# Patient Record
Sex: FEMALE | Race: WHITE | Employment: OTHER | URBAN - METROPOLITAN AREA
[De-identification: names, ages, dates, MRNs, and addresses within clinical notes are randomized per-mention and may not be internally consistent; named-entity substitution may affect disease eponyms.]

---

## 2017-01-19 RX ORDER — METOPROLOL SUCCINATE 50 MG/1
TABLET, EXTENDED RELEASE ORAL
Qty: 135 TABLET | Refills: 3 | Status: SHIPPED | OUTPATIENT
Start: 2017-01-19 | End: 2018-01-14 | Stop reason: SDUPTHER

## 2017-02-02 ENCOUNTER — OFFICE VISIT (OUTPATIENT)
Dept: FAMILY MEDICINE CLINIC | Age: 82
End: 2017-02-02

## 2017-02-02 VITALS
HEART RATE: 80 BPM | DIASTOLIC BLOOD PRESSURE: 56 MMHG | SYSTOLIC BLOOD PRESSURE: 112 MMHG | BODY MASS INDEX: 32.69 KG/M2 | RESPIRATION RATE: 16 BRPM | HEIGHT: 64 IN | WEIGHT: 191.5 LBS

## 2017-02-02 DIAGNOSIS — D50.8 OTHER IRON DEFICIENCY ANEMIA: ICD-10-CM

## 2017-02-02 DIAGNOSIS — I10 ESSENTIAL HYPERTENSION: ICD-10-CM

## 2017-02-02 DIAGNOSIS — E78.5 HYPERLIPIDEMIA, UNSPECIFIED HYPERLIPIDEMIA TYPE: ICD-10-CM

## 2017-02-02 DIAGNOSIS — M05.10 RHEUMATOID LUNG (HCC): ICD-10-CM

## 2017-02-02 DIAGNOSIS — J96.11 HYPOXEMIC RESPIRATORY FAILURE, CHRONIC (HCC): ICD-10-CM

## 2017-02-02 DIAGNOSIS — E11.69 TYPE 2 DIABETES MELLITUS WITH OTHER SPECIFIED COMPLICATION, WITHOUT LONG-TERM CURRENT USE OF INSULIN (HCC): Primary | ICD-10-CM

## 2017-02-02 DIAGNOSIS — M06.9 RHEUMATOID ARTHRITIS INVOLVING MULTIPLE SITES, UNSPECIFIED RHEUMATOID FACTOR PRESENCE: ICD-10-CM

## 2017-02-02 LAB
GLUCOSE BLD-MCNC: 116 MG/DL
HBA1C MFR BLD: 6.7 %

## 2017-02-02 PROCEDURE — 82962 GLUCOSE BLOOD TEST: CPT | Performed by: EMERGENCY MEDICINE

## 2017-02-02 PROCEDURE — 83036 HEMOGLOBIN GLYCOSYLATED A1C: CPT | Performed by: EMERGENCY MEDICINE

## 2017-02-02 PROCEDURE — 82270 OCCULT BLOOD FECES: CPT | Performed by: EMERGENCY MEDICINE

## 2017-02-02 PROCEDURE — 99214 OFFICE O/P EST MOD 30 MIN: CPT | Performed by: EMERGENCY MEDICINE

## 2017-02-02 RX ORDER — FERROUS SULFATE 325(65) MG
325 TABLET ORAL DAILY
Qty: 100 TABLET | Refills: 2 | Status: SHIPPED | OUTPATIENT
Start: 2017-02-02 | End: 2019-07-09 | Stop reason: CLARIF

## 2017-02-02 ASSESSMENT — ENCOUNTER SYMPTOMS
COUGH: 0
TROUBLE SWALLOWING: 0
BACK PAIN: 0
NAUSEA: 0
DIARRHEA: 0
CONSTIPATION: 0
VOMITING: 0
SORE THROAT: 0
ABDOMINAL PAIN: 0
VISUAL CHANGE: 0
SHORTNESS OF BREATH: 1
RHINORRHEA: 0
SINUS PRESSURE: 0
VOICE CHANGE: 0
CHEST TIGHTNESS: 0
WHEEZING: 0

## 2017-02-09 ENCOUNTER — TELEPHONE (OUTPATIENT)
Dept: FAMILY MEDICINE CLINIC | Age: 82
End: 2017-02-09

## 2017-02-09 DIAGNOSIS — R19.5 POSITIVE OCCULT STOOL BLOOD TEST: Primary | ICD-10-CM

## 2017-02-09 LAB
HEMOCCULT STL QL: NEGATIVE
HEMOCCULT STL QL: NEGATIVE
HEMOCCULT STL QL: POSITIVE

## 2017-02-15 ENCOUNTER — TELEPHONE (OUTPATIENT)
Dept: FAMILY MEDICINE CLINIC | Age: 82
End: 2017-02-15

## 2017-03-23 ENCOUNTER — OFFICE VISIT (OUTPATIENT)
Dept: FAMILY MEDICINE CLINIC | Age: 82
End: 2017-03-23

## 2017-03-23 VITALS
RESPIRATION RATE: 16 BRPM | HEIGHT: 63 IN | SYSTOLIC BLOOD PRESSURE: 104 MMHG | DIASTOLIC BLOOD PRESSURE: 50 MMHG | WEIGHT: 185.25 LBS | BODY MASS INDEX: 32.82 KG/M2 | HEART RATE: 88 BPM

## 2017-03-23 DIAGNOSIS — R04.0 RIGHT-SIDED EPISTAXIS: Primary | ICD-10-CM

## 2017-03-23 DIAGNOSIS — I10 ESSENTIAL HYPERTENSION: ICD-10-CM

## 2017-03-23 DIAGNOSIS — D50.9 IRON DEFICIENCY ANEMIA, UNSPECIFIED IRON DEFICIENCY ANEMIA TYPE: ICD-10-CM

## 2017-03-23 DIAGNOSIS — M05.10 RHEUMATOID LUNG (HCC): ICD-10-CM

## 2017-03-23 DIAGNOSIS — E78.5 HYPERLIPIDEMIA, UNSPECIFIED HYPERLIPIDEMIA TYPE: ICD-10-CM

## 2017-03-23 DIAGNOSIS — J84.10 LUNG FIBROSIS (HCC): ICD-10-CM

## 2017-03-23 LAB — HGB, POC: 10

## 2017-03-23 PROCEDURE — 99214 OFFICE O/P EST MOD 30 MIN: CPT | Performed by: EMERGENCY MEDICINE

## 2017-03-23 PROCEDURE — 85018 HEMOGLOBIN: CPT | Performed by: EMERGENCY MEDICINE

## 2017-03-23 RX ORDER — DEXTROSE (GLUCOSE), LEVULOSE (FRUCTOSE), PHOSPHORIC ACID 1.87; 1.87; 21.5 G/5ML; G/5ML; MG/5ML
SOLUTION ORAL
Refills: 0 | COMMUNITY
Start: 2017-03-02 | End: 2018-08-23 | Stop reason: ALTCHOICE

## 2017-03-23 RX ORDER — ERYTHROMYCIN 500 MG/1
TABLET, COATED ORAL
Refills: 0 | COMMUNITY
Start: 2017-03-16 | End: 2018-05-18 | Stop reason: ALTCHOICE

## 2017-03-23 ASSESSMENT — ENCOUNTER SYMPTOMS
WHEEZING: 0
VOMITING: 0
SORE THROAT: 0
RHINORRHEA: 0
CHEST TIGHTNESS: 0
DIARRHEA: 0
BACK PAIN: 0
NAUSEA: 0
SINUS PRESSURE: 0
CONSTIPATION: 0
TROUBLE SWALLOWING: 0
VOICE CHANGE: 0
SHORTNESS OF BREATH: 0
ABDOMINAL PAIN: 0
COUGH: 0

## 2017-05-02 ENCOUNTER — OFFICE VISIT (OUTPATIENT)
Dept: FAMILY MEDICINE CLINIC | Age: 82
End: 2017-05-02

## 2017-05-02 VITALS
RESPIRATION RATE: 16 BRPM | WEIGHT: 184 LBS | BODY MASS INDEX: 32.6 KG/M2 | HEIGHT: 63 IN | HEART RATE: 88 BPM | DIASTOLIC BLOOD PRESSURE: 70 MMHG | SYSTOLIC BLOOD PRESSURE: 120 MMHG

## 2017-05-02 DIAGNOSIS — I10 ESSENTIAL HYPERTENSION: ICD-10-CM

## 2017-05-02 DIAGNOSIS — M06.9 RHEUMATOID ARTHRITIS INVOLVING MULTIPLE SITES, UNSPECIFIED RHEUMATOID FACTOR PRESENCE: ICD-10-CM

## 2017-05-02 DIAGNOSIS — E78.5 HYPERLIPIDEMIA, UNSPECIFIED HYPERLIPIDEMIA TYPE: ICD-10-CM

## 2017-05-02 DIAGNOSIS — J84.10 LUNG FIBROSIS (HCC): ICD-10-CM

## 2017-05-02 DIAGNOSIS — E11.69 TYPE 2 DIABETES MELLITUS WITH OTHER SPECIFIED COMPLICATION, WITHOUT LONG-TERM CURRENT USE OF INSULIN (HCC): Primary | ICD-10-CM

## 2017-05-02 DIAGNOSIS — J96.11 HYPOXEMIC RESPIRATORY FAILURE, CHRONIC (HCC): ICD-10-CM

## 2017-05-02 LAB
GLUCOSE BLD-MCNC: 115 MG/DL
HBA1C MFR BLD: 6.3 %

## 2017-05-02 PROCEDURE — 99213 OFFICE O/P EST LOW 20 MIN: CPT | Performed by: EMERGENCY MEDICINE

## 2017-05-02 PROCEDURE — 82962 GLUCOSE BLOOD TEST: CPT | Performed by: EMERGENCY MEDICINE

## 2017-05-02 PROCEDURE — 83036 HEMOGLOBIN GLYCOSYLATED A1C: CPT | Performed by: EMERGENCY MEDICINE

## 2017-05-02 ASSESSMENT — ENCOUNTER SYMPTOMS
WHEEZING: 0
TROUBLE SWALLOWING: 0
COUGH: 0
VISUAL CHANGE: 0
VOICE CHANGE: 0
ABDOMINAL PAIN: 0
VOMITING: 0
BACK PAIN: 0
DIARRHEA: 0
SORE THROAT: 0
SINUS PRESSURE: 0
RHINORRHEA: 0
NAUSEA: 0
SHORTNESS OF BREATH: 1
CONSTIPATION: 0
CHEST TIGHTNESS: 0

## 2017-05-02 ASSESSMENT — PATIENT HEALTH QUESTIONNAIRE - PHQ9
SUM OF ALL RESPONSES TO PHQ QUESTIONS 1-9: 0
1. LITTLE INTEREST OR PLEASURE IN DOING THINGS: 0
SUM OF ALL RESPONSES TO PHQ9 QUESTIONS 1 & 2: 0
2. FEELING DOWN, DEPRESSED OR HOPELESS: 0

## 2017-06-07 RX ORDER — LOSARTAN POTASSIUM AND HYDROCHLOROTHIAZIDE 12.5; 5 MG/1; MG/1
TABLET ORAL
Qty: 90 TABLET | Refills: 1 | Status: SHIPPED | OUTPATIENT
Start: 2017-06-07 | End: 2018-03-20 | Stop reason: SDUPTHER

## 2017-06-26 RX ORDER — PANTOPRAZOLE SODIUM 40 MG/1
TABLET, DELAYED RELEASE ORAL
Qty: 90 TABLET | Refills: 0 | Status: SHIPPED | OUTPATIENT
Start: 2017-06-26 | End: 2017-09-25 | Stop reason: SDUPTHER

## 2017-07-06 ENCOUNTER — OFFICE VISIT (OUTPATIENT)
Dept: PULMONOLOGY | Age: 82
End: 2017-07-06

## 2017-07-06 VITALS
OXYGEN SATURATION: 95 % | DIASTOLIC BLOOD PRESSURE: 62 MMHG | HEIGHT: 63 IN | SYSTOLIC BLOOD PRESSURE: 114 MMHG | TEMPERATURE: 98.3 F | BODY MASS INDEX: 32.64 KG/M2 | WEIGHT: 184.2 LBS | HEART RATE: 90 BPM

## 2017-07-06 DIAGNOSIS — J96.11 HYPOXEMIC RESPIRATORY FAILURE, CHRONIC (HCC): Primary | ICD-10-CM

## 2017-07-06 DIAGNOSIS — M05.10 RHEUMATOID LUNG (HCC): ICD-10-CM

## 2017-07-06 PROCEDURE — 99213 OFFICE O/P EST LOW 20 MIN: CPT | Performed by: INTERNAL MEDICINE

## 2017-07-06 RX ORDER — IPRATROPIUM BROMIDE AND ALBUTEROL SULFATE 2.5; .5 MG/3ML; MG/3ML
1 SOLUTION RESPIRATORY (INHALATION) EVERY 4 HOURS
Qty: 360 ML | Refills: 11 | Status: SHIPPED | OUTPATIENT
Start: 2017-07-06 | End: 2018-07-12 | Stop reason: SDUPTHER

## 2017-08-17 ENCOUNTER — OFFICE VISIT (OUTPATIENT)
Dept: FAMILY MEDICINE CLINIC | Age: 82
End: 2017-08-17

## 2017-08-17 VITALS
HEIGHT: 63 IN | WEIGHT: 181.38 LBS | SYSTOLIC BLOOD PRESSURE: 116 MMHG | DIASTOLIC BLOOD PRESSURE: 62 MMHG | RESPIRATION RATE: 16 BRPM | BODY MASS INDEX: 32.14 KG/M2 | HEART RATE: 84 BPM

## 2017-08-17 DIAGNOSIS — E78.5 HYPERLIPIDEMIA, UNSPECIFIED HYPERLIPIDEMIA TYPE: ICD-10-CM

## 2017-08-17 DIAGNOSIS — J96.10 CHRONIC RESPIRATORY FAILURE, UNSPECIFIED WHETHER WITH HYPOXIA OR HYPERCAPNIA (HCC): ICD-10-CM

## 2017-08-17 DIAGNOSIS — I10 ESSENTIAL HYPERTENSION: ICD-10-CM

## 2017-08-17 DIAGNOSIS — E11.69 TYPE 2 DIABETES MELLITUS WITH OTHER SPECIFIED COMPLICATION, UNSPECIFIED LONG TERM INSULIN USE STATUS: ICD-10-CM

## 2017-08-17 DIAGNOSIS — J84.10 LUNG FIBROSIS (HCC): ICD-10-CM

## 2017-08-17 DIAGNOSIS — M05.711 RHEUMATOID ARTHRITIS INVOLVING BOTH SHOULDERS WITH POSITIVE RHEUMATOID FACTOR (HCC): Primary | ICD-10-CM

## 2017-08-17 DIAGNOSIS — M05.712 RHEUMATOID ARTHRITIS INVOLVING BOTH SHOULDERS WITH POSITIVE RHEUMATOID FACTOR (HCC): Primary | ICD-10-CM

## 2017-08-17 LAB
GLUCOSE BLD-MCNC: 100 MG/DL
HBA1C MFR BLD: 6.8 %

## 2017-08-17 PROCEDURE — 83036 HEMOGLOBIN GLYCOSYLATED A1C: CPT | Performed by: EMERGENCY MEDICINE

## 2017-08-17 PROCEDURE — 99214 OFFICE O/P EST MOD 30 MIN: CPT | Performed by: EMERGENCY MEDICINE

## 2017-08-17 PROCEDURE — 82962 GLUCOSE BLOOD TEST: CPT | Performed by: EMERGENCY MEDICINE

## 2017-08-17 RX ORDER — TRAMADOL HYDROCHLORIDE 100 MG/1
100 TABLET, EXTENDED RELEASE ORAL EVERY MORNING
Qty: 30 TABLET | Refills: 2 | Status: SHIPPED | OUTPATIENT
Start: 2017-08-17 | End: 2017-11-07 | Stop reason: SDUPTHER

## 2017-08-17 ASSESSMENT — ENCOUNTER SYMPTOMS
NAUSEA: 0
SORE THROAT: 0
SHORTNESS OF BREATH: 1
RHINORRHEA: 0
CONSTIPATION: 0
VOICE CHANGE: 0
COUGH: 0
BACK PAIN: 0
DIARRHEA: 0
SINUS PRESSURE: 0
VOMITING: 0
CHEST TIGHTNESS: 0
TROUBLE SWALLOWING: 0
VISUAL CHANGE: 0
ABDOMINAL PAIN: 0
WHEEZING: 0

## 2017-08-22 ENCOUNTER — TELEPHONE (OUTPATIENT)
Dept: FAMILY MEDICINE CLINIC | Age: 82
End: 2017-08-22

## 2017-09-25 RX ORDER — PANTOPRAZOLE SODIUM 40 MG/1
TABLET, DELAYED RELEASE ORAL
Qty: 90 TABLET | Refills: 1 | Status: SHIPPED | OUTPATIENT
Start: 2017-09-25 | End: 2018-03-24 | Stop reason: SDUPTHER

## 2017-11-05 LAB
ALBUMIN SERPL-MCNC: 3.8 G/DL (ref 3.2–5.3)
ALK PHOSPHATASE: 81 IU/L (ref 35–121)
ALT SERPL-CCNC: 9 IU/L (ref 5–59)
ANION GAP SERPL CALCULATED.3IONS-SCNC: 13 MMOL/L
AST SERPL-CCNC: 16 IU/L (ref 10–42)
BILIRUB SERPL-MCNC: 0.5 MG/DL (ref 0.2–1.3)
BUN BLDV-MCNC: 17 MG/DL (ref 9–24)
CALCIUM SERPL-MCNC: 9.8 MG/DL (ref 8.7–10.8)
CHLORIDE BLD-SCNC: 98 MMOL/L (ref 95–111)
CHOLESTEROL/HDL RATIO: 2.3
CHOLESTEROL: 153 MG/DL
CO2: 32 MMOL/L (ref 21–32)
CREAT SERPL-MCNC: 0.9 MG/DL (ref 0.5–1.3)
EGFR AFRICAN AMERICAN: 73
EGFR IF NONAFRICAN AMERICAN: 60
GLUCOSE: 143 MG/DL (ref 70–100)
HDLC SERPL-MCNC: 67 MG/DL (ref 40–60)
LDL CHOLESTEROL CALCULATED: 61 MG/DL
LDL/HDL RATIO: 0.9
MAGNESIUM: 2.1 MG/DL (ref 1.7–2.4)
POTASSIUM SERPL-SCNC: 4.2 MMOL/L (ref 3.5–5.4)
SODIUM BLD-SCNC: 139 MMOL/L (ref 134–147)
TOTAL PROTEIN: 7.1 G/DL (ref 5.8–8)
TRIGL SERPL-MCNC: 124 MG/DL
VLDLC SERPL CALC-MCNC: 25 MG/DL

## 2017-11-06 ENCOUNTER — OFFICE VISIT (OUTPATIENT)
Dept: CARDIOLOGY CLINIC | Age: 82
End: 2017-11-06
Payer: MEDICARE

## 2017-11-06 VITALS
WEIGHT: 174 LBS | DIASTOLIC BLOOD PRESSURE: 66 MMHG | HEART RATE: 91 BPM | HEIGHT: 62 IN | SYSTOLIC BLOOD PRESSURE: 114 MMHG | BODY MASS INDEX: 32.02 KG/M2

## 2017-11-06 DIAGNOSIS — I25.10 CORONARY ARTERY DISEASE INVOLVING NATIVE CORONARY ARTERY OF NATIVE HEART WITHOUT ANGINA PECTORIS: Primary | ICD-10-CM

## 2017-11-06 DIAGNOSIS — E78.01 FAMILIAL HYPERCHOLESTEROLEMIA: ICD-10-CM

## 2017-11-06 DIAGNOSIS — I10 ESSENTIAL HYPERTENSION: ICD-10-CM

## 2017-11-06 PROCEDURE — 99213 OFFICE O/P EST LOW 20 MIN: CPT | Performed by: NUCLEAR MEDICINE

## 2017-11-06 PROCEDURE — 93000 ELECTROCARDIOGRAM COMPLETE: CPT | Performed by: NUCLEAR MEDICINE

## 2017-11-06 NOTE — PROGRESS NOTES
Pt here for 1 year fu     Denies chest pain, dizziness   Pt is on O2 24/7
Cholesterol Mother     Stroke Mother     Diabetes Mother     Heart Disease Father     High Blood Pressure Father     Pacemaker Father      Social History   Substance Use Topics    Smoking status: Never Smoker    Smokeless tobacco: Never Used    Alcohol use 0.0 oz/week      Comment: RARE      Current Outpatient Prescriptions   Medication Sig Dispense Refill    pantoprazole (PROTONIX) 40 MG tablet TAKE 1 TABLET DAILY 90 tablet 1    traMADol (ULTRAM ER) 100 MG extended release tablet Take 1 tablet by mouth every morning 30 tablet 2    ipratropium-albuterol (DUONEB) 0.5-2.5 (3) MG/3ML SOLN nebulizer solution Inhale 3 mLs into the lungs every 4 hours 360 mL 11    losartan-hydrochlorothiazide (HYZAAR) 50-12.5 MG per tablet TAKE 1 TABLET DAILY 90 tablet 1    erythromycin base (E-MYCIN) 500 MG tablet take 1 tablet by mouth 1 HOUR PRIOR TO DENTAL APPOINTMENT  0    RA 8 HOUR PAIN RELIEF 650 MG extended release tablet   0    Probiotic Product (ALIGN PO) Take by mouth daily      ferrous sulfate (LEONORA-IRENE) 325 (65 FE) MG tablet Take 1 tablet by mouth daily 100 tablet 2    metoprolol succinate (TOPROL XL) 50 MG extended release tablet TAKE ONE AND ONE-HALF TABLETS DAILY 135 tablet 3    atorvastatin (LIPITOR) 40 MG tablet TAKE 1 TABLET EVERY EVENING 90 tablet 3    Omega-3 Fatty Acids (FISH OIL PO) Take by mouth      desoximetasone (TOPICORT) 0.25 % cream Apply topically 2 times daily   0    OXYGEN 2 lpm at rest and to sleep and 4 lpm to ambulate (Patient taking differently: 3 lpm at rest and to sleep and 4 lpm to ambulate) 1 Device 6    Methotrexate Sodium, PF, (RHEUMATREX) 1 GM/40ML SOLN chemo injection Inject 12.5 mg into the muscle once Every other week      therapeutic multivitamin-minerals (THERAGRAN-M) tablet Take 1 tablet by mouth daily.  Calcium Citrate-Vitamin D 200-200 MG-UNIT TABS Take 1 tablet by mouth 2 times daily       Coenzyme Q10 (COQ10) 100 MG CAPS Take 100 mg by mouth daily.

## 2017-11-07 ENCOUNTER — OFFICE VISIT (OUTPATIENT)
Dept: FAMILY MEDICINE CLINIC | Age: 82
End: 2017-11-07

## 2017-11-07 VITALS
WEIGHT: 176 LBS | HEART RATE: 80 BPM | DIASTOLIC BLOOD PRESSURE: 64 MMHG | RESPIRATION RATE: 14 BRPM | SYSTOLIC BLOOD PRESSURE: 126 MMHG | BODY MASS INDEX: 32.39 KG/M2 | HEIGHT: 62 IN

## 2017-11-07 DIAGNOSIS — M05.10 RHEUMATOID LUNG DISEASE WITH RHEUMATOID ARTHRITIS (HCC): ICD-10-CM

## 2017-11-07 DIAGNOSIS — I10 ESSENTIAL HYPERTENSION: ICD-10-CM

## 2017-11-07 DIAGNOSIS — I25.10 CORONARY ARTERY DISEASE INVOLVING NATIVE CORONARY ARTERY OF NATIVE HEART WITHOUT ANGINA PECTORIS: ICD-10-CM

## 2017-11-07 DIAGNOSIS — K21.9 GASTROESOPHAGEAL REFLUX DISEASE, ESOPHAGITIS PRESENCE NOT SPECIFIED: ICD-10-CM

## 2017-11-07 DIAGNOSIS — M06.9 RHEUMATOID ARTHRITIS INVOLVING MULTIPLE SITES, UNSPECIFIED RHEUMATOID FACTOR PRESENCE: Primary | ICD-10-CM

## 2017-11-07 DIAGNOSIS — E78.5 HYPERLIPIDEMIA, UNSPECIFIED HYPERLIPIDEMIA TYPE: ICD-10-CM

## 2017-11-07 DIAGNOSIS — G89.4 CHRONIC PAIN SYNDROME: ICD-10-CM

## 2017-11-07 PROCEDURE — 99213 OFFICE O/P EST LOW 20 MIN: CPT | Performed by: EMERGENCY MEDICINE

## 2017-11-07 RX ORDER — TRAMADOL HYDROCHLORIDE 100 MG/1
100 TABLET, EXTENDED RELEASE ORAL EVERY MORNING
Qty: 30 TABLET | Refills: 2 | Status: SHIPPED | OUTPATIENT
Start: 2017-11-07 | End: 2018-03-08 | Stop reason: SDUPTHER

## 2017-11-07 ASSESSMENT — ENCOUNTER SYMPTOMS
VOICE CHANGE: 0
ABDOMINAL PAIN: 0
SORE THROAT: 0
BACK PAIN: 0
DIARRHEA: 0
SHORTNESS OF BREATH: 1
RHINORRHEA: 0
SINUS PRESSURE: 0
NAUSEA: 0
COUGH: 0
WHEEZING: 0
CHEST TIGHTNESS: 0
TROUBLE SWALLOWING: 0
VISUAL CHANGE: 0
VOMITING: 0
CONSTIPATION: 0

## 2017-11-07 ASSESSMENT — PATIENT HEALTH QUESTIONNAIRE - PHQ9
2. FEELING DOWN, DEPRESSED OR HOPELESS: 0
SUM OF ALL RESPONSES TO PHQ QUESTIONS 1-9: 0
1. LITTLE INTEREST OR PLEASURE IN DOING THINGS: 0
SUM OF ALL RESPONSES TO PHQ9 QUESTIONS 1 & 2: 0

## 2017-11-07 NOTE — PROGRESS NOTES
Visit Date: 11/7/2017    Phil Elliott is a 80 y.o. female who presents today for:  Chief Complaint   Patient presents with    Hypertension    Diabetes         HPI:     Ultram ER is helping her pain and tolerating it well    Selam [place] yesterday and she is doing well      Diabetes   She presents for her follow-up diabetic visit. She has type 2 diabetes mellitus. Her disease course has been stable. Pertinent negatives for hypoglycemia include no dizziness, headaches, nervousness/anxiousness or speech difficulty. Pertinent negatives for diabetes include no chest pain, no fatigue, no foot paresthesias, no visual change and no weakness. Symptoms are stable. An ACE inhibitor/angiotensin II receptor blocker is being taken. Hypertension   Associated symptoms include shortness of breath. Pertinent negatives include no chest pain, headaches, neck pain or palpitations. Hyperlipidemia   Associated symptoms include shortness of breath. Pertinent negatives include no chest pain or myalgias. Coronary Artery Disease   Symptoms include shortness of breath. Pertinent negatives include no chest pain, chest tightness, dizziness, leg swelling or palpitations. Risk factors include hyperlipidemia. Current Medications:  Current Outpatient Prescriptions   Medication Sig Dispense Refill    traMADol (ULTRAM ER) 100 MG extended release tablet Take 1 tablet by mouth every morning .  30 tablet 2    pantoprazole (PROTONIX) 40 MG tablet TAKE 1 TABLET DAILY 90 tablet 1    ipratropium-albuterol (DUONEB) 0.5-2.5 (3) MG/3ML SOLN nebulizer solution Inhale 3 mLs into the lungs every 4 hours 360 mL 11    losartan-hydrochlorothiazide (HYZAAR) 50-12.5 MG per tablet TAKE 1 TABLET DAILY 90 tablet 1    erythromycin base (E-MYCIN) 500 MG tablet take 1 tablet by mouth 1 HOUR PRIOR TO DENTAL APPOINTMENT  0    RA 8 HOUR PAIN RELIEF 650 MG extended release tablet   0    Probiotic Product (ALIGN PO) Take by mouth daily      ferrous sulfate (LEONORA-IRENE) 325 (65 FE) MG tablet Take 1 tablet by mouth daily 100 tablet 2    metoprolol succinate (TOPROL XL) 50 MG extended release tablet TAKE ONE AND ONE-HALF TABLETS DAILY 135 tablet 3    atorvastatin (LIPITOR) 40 MG tablet TAKE 1 TABLET EVERY EVENING 90 tablet 3    Omega-3 Fatty Acids (FISH OIL PO) Take by mouth      desoximetasone (TOPICORT) 0.25 % cream Apply topically 2 times daily   0    OXYGEN 2 lpm at rest and to sleep and 4 lpm to ambulate (Patient taking differently: 3 lpm at rest and to sleep and 4 lpm to ambulate) 1 Device 6    Methotrexate Sodium, PF, (RHEUMATREX) 1 GM/40ML SOLN chemo injection Inject 12.5 mg into the muscle once Every other week      therapeutic multivitamin-minerals (THERAGRAN-M) tablet Take 1 tablet by mouth daily.  Calcium Citrate-Vitamin D 200-200 MG-UNIT TABS Take 1 tablet by mouth 2 times daily       Coenzyme Q10 (COQ10) 100 MG CAPS Take 100 mg by mouth daily.  aspirin 81 MG EC tablet Take 81 mg by mouth daily.  folic acid (FOLVITE) 1 MG tablet Take 1 mg by mouth daily. No current facility-administered medications for this visit. Subjective:      Review of Systems   Constitutional: Negative for appetite change, chills, diaphoresis, fatigue and fever. HENT: Negative for congestion, ear pain, postnasal drip, rhinorrhea, sinus pressure, sneezing, sore throat, trouble swallowing and voice change. Respiratory: Positive for shortness of breath. Negative for cough, chest tightness and wheezing. Cardiovascular: Negative for chest pain, palpitations and leg swelling. Gastrointestinal: Negative for abdominal pain, constipation, diarrhea, nausea and vomiting. Musculoskeletal: Negative for arthralgias, back pain, joint swelling, myalgias, neck pain and neck stiffness. Neurological: Negative for dizziness, syncope, speech difficulty, weakness, light-headedness, numbness and headaches.    Psychiatric/Behavioral: The patient is not nervous/anxious. Objective:     /64 (Site: Right Arm, Position: Sitting, Cuff Size: Medium Adult)   Pulse 80   Resp 14   Ht 5' 2\" (1.575 m)   Wt 176 lb (79.8 kg)   Breastfeeding? No   BMI 32.19 kg/m²   BP Readings from Last 3 Encounters:   11/07/17 126/64   11/06/17 114/66   08/17/17 116/62     Wt Readings from Last 3 Encounters:   11/07/17 176 lb (79.8 kg)   11/06/17 174 lb (78.9 kg)   08/17/17 181 lb 6 oz (82.3 kg)       Physical Exam   Constitutional: She is oriented to person, place, and time. She appears well-developed and well-nourished. She is cooperative. HENT:   Head: Normocephalic and atraumatic. Right Ear: External ear normal.   Left Ear: External ear normal.   Nose: Nose normal.   Mouth/Throat: Oropharynx is clear and moist.   Eyes: Conjunctivae and EOM are normal. Pupils are equal, round, and reactive to light. No scleral icterus. Neck: Normal range of motion. Neck supple. No JVD present. No thyromegaly present. Cardiovascular: Normal rate, regular rhythm and intact distal pulses. Exam reveals no friction rub. No murmur heard. Pulmonary/Chest: Effort normal. She has decreased breath sounds. She has no wheezes. She has rales. She exhibits no tenderness. Abdominal: Soft. Bowel sounds are normal. She exhibits no mass. There is no tenderness. Musculoskeletal: She exhibits no edema. Lymphadenopathy:     She has no cervical adenopathy. Neurological: She is alert and oriented to person, place, and time. Skin: No rash noted. Vitals reviewed. Assessment:         1. Rheumatoid arthritis involving multiple sites, unspecified rheumatoid factor presence (HCC)  traMADol (ULTRAM ER) 100 MG extended release tablet   2. Chronic pain syndrome  traMADol (ULTRAM ER) 100 MG extended release tablet   3. Essential hypertension     4. Coronary artery disease involving native coronary artery of native heart without angina pectoris     5.  Hyperlipidemia, unspecified hyperlipidemia type     6. 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 Dispense:  30 tablet     Refill:  2       Orders Placed:  No orders of the defined types were placed in this encounter. Lab Results   Component Value Date    LABA1C 6.8 08/17/2017    LABA1C 6.3 05/02/2017    LABA1C 6.7 02/02/2017     Lab Results   Component Value Date    LDLCALC 61 11/04/2017    CREATININE 0.9 11/04/2017       Results of Laboratory tests taken 11/4/17  were reviewed with the patient. Results were w/in  acceptable range    Continue to monitor blood sugars 1 times a day. Return in about 3 months (around 2/7/2018) for HTN DM. Discussed use, benefit, and side effects of prescribed medications. All patient questions answered. Pt voiced understanding. Instructed to continue current medications, diet and exercise. Patient agreed with treatment plan.

## 2017-11-13 RX ORDER — ATORVASTATIN CALCIUM 40 MG/1
TABLET, FILM COATED ORAL
Qty: 90 TABLET | Refills: 3 | Status: SHIPPED | OUTPATIENT
Start: 2017-11-13 | End: 2018-11-08 | Stop reason: SDUPTHER

## 2018-01-15 RX ORDER — METOPROLOL SUCCINATE 50 MG/1
TABLET, EXTENDED RELEASE ORAL
Qty: 135 TABLET | Refills: 3 | Status: SHIPPED | OUTPATIENT
Start: 2018-01-15 | End: 2019-01-13 | Stop reason: SDUPTHER

## 2018-02-13 ENCOUNTER — OFFICE VISIT (OUTPATIENT)
Dept: FAMILY MEDICINE CLINIC | Age: 83
End: 2018-02-13

## 2018-02-13 VITALS
HEIGHT: 62 IN | RESPIRATION RATE: 16 BRPM | WEIGHT: 172 LBS | HEART RATE: 64 BPM | SYSTOLIC BLOOD PRESSURE: 108 MMHG | DIASTOLIC BLOOD PRESSURE: 60 MMHG | BODY MASS INDEX: 31.65 KG/M2

## 2018-02-13 DIAGNOSIS — J96.11 HYPOXEMIC RESPIRATORY FAILURE, CHRONIC (HCC): ICD-10-CM

## 2018-02-13 DIAGNOSIS — E11.69 TYPE 2 DIABETES MELLITUS WITH OTHER SPECIFIED COMPLICATION, UNSPECIFIED LONG TERM INSULIN USE STATUS: Primary | ICD-10-CM

## 2018-02-13 DIAGNOSIS — M05.10 RHEUMATOID LUNG DISEASE WITH RHEUMATOID ARTHRITIS (HCC): ICD-10-CM

## 2018-02-13 DIAGNOSIS — E78.5 HYPERLIPIDEMIA, UNSPECIFIED HYPERLIPIDEMIA TYPE: ICD-10-CM

## 2018-02-13 DIAGNOSIS — D84.9 IMMUNOCOMPROMISED (HCC): ICD-10-CM

## 2018-02-13 DIAGNOSIS — I10 ESSENTIAL HYPERTENSION: ICD-10-CM

## 2018-02-13 DIAGNOSIS — M05.79 RHEUMATOID ARTHRITIS INVOLVING MULTIPLE SITES WITH POSITIVE RHEUMATOID FACTOR (HCC): ICD-10-CM

## 2018-02-13 LAB
GLUCOSE BLD-MCNC: 145 MG/DL
HBA1C MFR BLD: 6.8 %

## 2018-02-13 PROCEDURE — 99213 OFFICE O/P EST LOW 20 MIN: CPT | Performed by: EMERGENCY MEDICINE

## 2018-02-13 PROCEDURE — 83036 HEMOGLOBIN GLYCOSYLATED A1C: CPT | Performed by: EMERGENCY MEDICINE

## 2018-02-13 PROCEDURE — 82962 GLUCOSE BLOOD TEST: CPT | Performed by: EMERGENCY MEDICINE

## 2018-02-13 RX ORDER — PREDNISONE 1 MG/1
1 TABLET ORAL DAILY
Refills: 0 | COMMUNITY
Start: 2018-01-03 | End: 2018-08-23 | Stop reason: ALTCHOICE

## 2018-02-13 RX ORDER — METOPROLOL SUCCINATE 50 MG
TABLET, EXTENDED RELEASE 24 HR ORAL
COMMUNITY
Start: 2018-01-15 | End: 2018-02-13 | Stop reason: SDUPTHER

## 2018-02-13 ASSESSMENT — ENCOUNTER SYMPTOMS
CHEST TIGHTNESS: 0
CONSTIPATION: 0
TROUBLE SWALLOWING: 0
SINUS PRESSURE: 0
NAUSEA: 0
RHINORRHEA: 0
WHEEZING: 0
COUGH: 0
BACK PAIN: 0
DIARRHEA: 0
VOICE CHANGE: 0
ABDOMINAL PAIN: 0
SORE THROAT: 0
SHORTNESS OF BREATH: 1
VISUAL CHANGE: 0
VOMITING: 0

## 2018-02-13 NOTE — PROGRESS NOTES
CBC Auto Differential   4. Essential hypertension  CBC Auto Differential    Comprehensive Metabolic Panel    Lipid Panel   5. 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 ([facility] [address]         Plan:      Medications Prescribed:  No orders of the defined types were placed in this encounter. Orders Placed:  Orders Placed This Encounter   Procedures    CBC Auto Differential     Laboratory Test to be done in 3 months     Standing Status:   Future     Standing Expiration Date:   2/13/2019    Comprehensive Metabolic Panel     Laboratory Test to be done in 3 months     Standing Status:   Future     Standing Expiration Date:   2/13/2019    Lipid Panel     Laboratory Test to be done in 3 months     Standing Status:   Future     Standing Expiration Date:   2/13/2019     Order Specific Question:   Is Patient Fasting?/# of Hours     Answer:   12    POCT glycosylated hemoglobin (Hb A1C)    POCT Glucose     Lab Results   Component Value Date    LABA1C 6.8 02/13/2018    LABA1C 6.8 08/17/2017    LABA1C 6.3 05/02/2017     Lab Results   Component Value Date    LDLCALC 61 11/04/2017    CREATININE 0.9 11/04/2017       Continue to monitor blood sugars 1 times a day. Return in about 3 months (around 5/13/2018) for dm. Discussed use, benefit, and side effects of prescribed medications. All patient questions answered. Pt voiced understanding. Instructed to continue current medications, diet and exercise. Patient agreed with treatment plan.

## 2018-03-08 DIAGNOSIS — M06.9 RHEUMATOID ARTHRITIS INVOLVING MULTIPLE SITES, UNSPECIFIED RHEUMATOID FACTOR PRESENCE: ICD-10-CM

## 2018-03-08 DIAGNOSIS — G89.4 CHRONIC PAIN SYNDROME: ICD-10-CM

## 2018-03-08 RX ORDER — TRAMADOL HYDROCHLORIDE 100 MG/1
100 TABLET, EXTENDED RELEASE ORAL EVERY MORNING
Qty: 30 TABLET | Refills: 3 | Status: SHIPPED | OUTPATIENT
Start: 2018-03-08 | End: 2018-07-13 | Stop reason: SDUPTHER

## 2018-03-08 NOTE — TELEPHONE ENCOUNTER
Controlled Substances Monitoring: The Prescription Monitoring Report for this patient was reviewed today. Cristina Hnut MD)    No signs of potential drug abuse or diversion identified. Cristina Hunt MD)         Functional status reviewed - continues with improved or maintaining ADL's., Reviewed the patient's functional status and documentation.  Cristina Hunt MD)

## 2018-03-20 RX ORDER — LOSARTAN POTASSIUM AND HYDROCHLOROTHIAZIDE 12.5; 5 MG/1; MG/1
1 TABLET ORAL DAILY
Qty: 90 TABLET | Refills: 1 | Status: SHIPPED | OUTPATIENT
Start: 2018-03-20 | End: 2018-09-10 | Stop reason: SDUPTHER

## 2018-03-20 NOTE — TELEPHONE ENCOUNTER
Pt called for a refill of:    losartan-hydrochlorothiazide (HYZAAR) 50-12.5 MG per tablet qd    Send 90 day supply to [facility]

## 2018-03-20 NOTE — TELEPHONE ENCOUNTER
Date of last visit:  2/13/2018  Date of next visit:  5/22/2018    Requested Prescriptions      No prescriptions requested or ordered in this encounter

## 2018-03-27 RX ORDER — PANTOPRAZOLE SODIUM 40 MG/1
TABLET, DELAYED RELEASE ORAL
Qty: 90 TABLET | Refills: 1 | Status: SHIPPED | OUTPATIENT
Start: 2018-03-27 | End: 2018-09-23 | Stop reason: SDUPTHER

## 2018-05-18 ENCOUNTER — OFFICE VISIT (OUTPATIENT)
Dept: FAMILY MEDICINE CLINIC | Age: 83
End: 2018-05-18

## 2018-05-18 VITALS
WEIGHT: 166.4 LBS | HEIGHT: 62 IN | TEMPERATURE: 98.6 F | SYSTOLIC BLOOD PRESSURE: 106 MMHG | HEART RATE: 76 BPM | RESPIRATION RATE: 16 BRPM | DIASTOLIC BLOOD PRESSURE: 60 MMHG | BODY MASS INDEX: 30.62 KG/M2

## 2018-05-18 DIAGNOSIS — I10 ESSENTIAL HYPERTENSION: Primary | ICD-10-CM

## 2018-05-18 DIAGNOSIS — J40 BRONCHITIS: ICD-10-CM

## 2018-05-18 LAB
ABSOLUTE BASO #: 0.1 K/UL (ref 0–0.1)
ABSOLUTE EOS #: 0.2 K/UL (ref 0.1–0.4)
ABSOLUTE LYMPH #: 1.6 K/UL (ref 0.8–5.2)
ABSOLUTE MONO #: 0.7 K/UL (ref 0.1–0.9)
ABSOLUTE NEUT #: 7.2 K/UL (ref 1.3–9.1)
ALBUMIN SERPL-MCNC: 4.5 G/DL (ref 3.5–5.2)
ALK PHOSPHATASE: 97 U/L (ref 30–146)
ALT SERPL-CCNC: 11 U/L (ref 5–40)
ANION GAP SERPL CALCULATED.3IONS-SCNC: 14 MEQ/L (ref 10–19)
AST SERPL-CCNC: 18 U/L (ref 9–40)
BASOPHILS RELATIVE PERCENT: 0.8 %
BILIRUB SERPL-MCNC: 0.4 MG/DL
BUN BLDV-MCNC: 16 MG/DL (ref 8–23)
CALCIUM SERPL-MCNC: 10.7 MG/DL (ref 8.5–10.5)
CHLORIDE BLD-SCNC: 96 MEQ/L (ref 95–107)
CHOLESTEROL/HDL RATIO: 2
CHOLESTEROL: 174 MG/DL
CO2: 30 MEQ/L (ref 19–31)
CREAT SERPL-MCNC: 0.8 MG/DL (ref 0.6–1.3)
EGFR AFRICAN AMERICAN: 79.6 ML/MIN/1.73 M2
EGFR IF NONAFRICAN AMERICAN: 68.7 ML/MIN/1.73 M2
EOSINOPHILS RELATIVE PERCENT: 2 %
GLUCOSE: 128 MG/DL (ref 70–99)
HCT VFR BLD CALC: 37 % (ref 36–48)
HDLC SERPL-MCNC: 88 MG/DL
HEMOGLOBIN: 11.3 G/DL (ref 12–16)
LDL CHOLESTEROL CALCULATED: 64 MG/DL
LDL/HDL RATIO: 0.7
LYMPHOCYTE %: 16.7 %
MCH RBC QN AUTO: 24.9 PG (ref 27–34)
MCHC RBC AUTO-ENTMCNC: 30.5 G/DL (ref 31–36)
MCV RBC AUTO: 81.7 FL (ref 80–100)
MONOCYTES # BLD: 6.7 %
NEUTROPHILS RELATIVE PERCENT: 73.4 %
PDW BLD-RTO: 15.8 % (ref 10.8–14.8)
PLATELETS: 240 K/UL (ref 150–450)
POTASSIUM SERPL-SCNC: 4.3 MEQ/L (ref 3.5–5.4)
RBC: 4.53 M/UL (ref 4–5.5)
SODIUM BLD-SCNC: 140 MEQ/L (ref 135–146)
TOTAL PROTEIN: 7.8 G/DL (ref 6.1–8.3)
TRIGL SERPL-MCNC: 112 MG/DL
VLDLC SERPL CALC-MCNC: 22 MG/DL
WBC: 9.8 K/UL (ref 3.7–10.8)

## 2018-05-18 PROCEDURE — 99213 OFFICE O/P EST LOW 20 MIN: CPT | Performed by: FAMILY MEDICINE

## 2018-05-18 RX ORDER — TRAMADOL HYDROCHLORIDE 100 MG/1
TABLET, FILM COATED, EXTENDED RELEASE ORAL
Refills: 0 | COMMUNITY
Start: 2018-05-10 | End: 2018-05-18 | Stop reason: SDUPTHER

## 2018-05-18 RX ORDER — LEVOFLOXACIN 500 MG/1
500 TABLET, FILM COATED ORAL DAILY
Qty: 10 TABLET | Refills: 0 | Status: SHIPPED | OUTPATIENT
Start: 2018-05-18 | End: 2018-05-28

## 2018-05-18 ASSESSMENT — ENCOUNTER SYMPTOMS
COUGH: 1
SHORTNESS OF BREATH: 0
DIARRHEA: 0
VOMITING: 0
ABDOMINAL PAIN: 0
EYES NEGATIVE: 1
SORE THROAT: 1
CONSTIPATION: 0
NAUSEA: 0
CHEST TIGHTNESS: 0

## 2018-05-18 NOTE — PROGRESS NOTES
 JOINT REPLACEMENT  5/29/02    left knee    TONSILLECTOMY AND ADENOIDECTOMY  1945    TOTAL KNEE ARTHROPLASTY Left 2002    TRANSTHORACIC ECHOCARDIOGRAM  01/28/2005    EF 70%    UPPER GASTROINTESTINAL ENDOSCOPY  03/21/2017       Family History   Problem Relation Age of Onset    Heart Disease Mother     High Cholesterol Mother     Stroke Mother     Diabetes Mother     Heart Disease Father     High Blood Pressure Father     Pacemaker Father      Subjective:      Review of Systems   Constitutional: Negative for activity change, appetite change, diaphoresis, fatigue and fever. HENT: Positive for congestion and sore throat. Eyes: Negative. Respiratory: Positive for cough. Negative for chest tightness and shortness of breath. She felt a rattle on her chest yesterday and this Am but it is better now after she coughed and brought up phlegm. Cardiovascular: Negative for chest pain, palpitations and leg swelling. Gastrointestinal: Negative for abdominal pain, constipation, diarrhea, nausea and vomiting. Genitourinary: Negative. Musculoskeletal: Negative. Skin: Negative. Negative for rash. Neurological: Negative for dizziness, syncope, weakness, light-headedness, numbness and headaches. Psychiatric/Behavioral: Negative. Objective:   /60 (Site: Right Arm, Position: Sitting, Cuff Size: Medium Adult)   Pulse 76   Temp 98.6 °F (37 °C)   Resp 16   Ht 5' 2\" (1.575 m)   Wt 166 lb 6.4 oz (75.5 kg)   Breastfeeding? No   BMI 30.43 kg/m²   Wt Readings from Last 3 Encounters:   05/18/18 166 lb 6.4 oz (75.5 kg)   02/13/18 172 lb (78 kg)   11/07/17 176 lb (79.8 kg)     Physical Exam   Constitutional: She is oriented to person, place, and time. She appears well-developed and well-nourished. No distress. HENT:   Head: Normocephalic and atraumatic.    Right Ear: Tympanic membrane, external ear and ear canal normal.   Left Ear: Tympanic membrane, external ear and ear canal

## 2018-05-22 ENCOUNTER — OFFICE VISIT (OUTPATIENT)
Dept: FAMILY MEDICINE CLINIC | Age: 83
End: 2018-05-22

## 2018-05-22 VITALS
RESPIRATION RATE: 16 BRPM | HEIGHT: 62 IN | BODY MASS INDEX: 30 KG/M2 | SYSTOLIC BLOOD PRESSURE: 130 MMHG | HEART RATE: 84 BPM | WEIGHT: 163 LBS | DIASTOLIC BLOOD PRESSURE: 70 MMHG

## 2018-05-22 DIAGNOSIS — D05.10 DUCTAL CARCINOMA IN SITU (DCIS) OF BREAST, UNSPECIFIED LATERALITY: ICD-10-CM

## 2018-05-22 DIAGNOSIS — M05.10 RHEUMATOID LUNG DISEASE WITH RHEUMATOID ARTHRITIS (HCC): ICD-10-CM

## 2018-05-22 DIAGNOSIS — J20.9 ACUTE BRONCHITIS, UNSPECIFIED ORGANISM: Primary | ICD-10-CM

## 2018-05-22 DIAGNOSIS — I10 ESSENTIAL HYPERTENSION: ICD-10-CM

## 2018-05-22 DIAGNOSIS — K21.9 GASTROESOPHAGEAL REFLUX DISEASE, ESOPHAGITIS PRESENCE NOT SPECIFIED: ICD-10-CM

## 2018-05-22 DIAGNOSIS — J47.9 BRONCHIECTASIS WITHOUT COMPLICATION (HCC): ICD-10-CM

## 2018-05-22 DIAGNOSIS — J84.10 LUNG FIBROSIS (HCC): ICD-10-CM

## 2018-05-22 PROCEDURE — 99213 OFFICE O/P EST LOW 20 MIN: CPT | Performed by: EMERGENCY MEDICINE

## 2018-05-22 ASSESSMENT — ENCOUNTER SYMPTOMS
VOMITING: 0
SHORTNESS OF BREATH: 1
TROUBLE SWALLOWING: 0
NAUSEA: 0
CONSTIPATION: 0
BACK PAIN: 0
DIARRHEA: 0
CHEST TIGHTNESS: 0
ABDOMINAL PAIN: 0
VOICE CHANGE: 0
COUGH: 1
SINUS PRESSURE: 0
RHINORRHEA: 0
WHEEZING: 0
SORE THROAT: 0

## 2018-06-13 ENCOUNTER — HOSPITAL ENCOUNTER (OUTPATIENT)
Dept: WOMENS IMAGING | Age: 83
Discharge: HOME | End: 2018-06-13
Payer: MEDICARE

## 2018-06-13 DIAGNOSIS — Z12.31 VISIT FOR SCREENING MAMMOGRAM: ICD-10-CM

## 2018-06-13 PROCEDURE — 77063 BREAST TOMOSYNTHESIS BI: CPT

## 2018-06-15 ENCOUNTER — HOSPITAL ENCOUNTER (OUTPATIENT)
Dept: WOMENS IMAGING | Age: 83
Discharge: HOME | End: 2018-06-15
Payer: MEDICARE

## 2018-06-15 DIAGNOSIS — Z00.6 ENCOUNTER FOR EXAMINATION FOR NORMAL COMPARISON OR CONTROL IN CLINICAL RESEARCH PROGRAM: ICD-10-CM

## 2018-06-15 PROCEDURE — 3209999900 MAM COMPARISON OF OUTSIDE IMAGES

## 2018-07-05 ENCOUNTER — HOSPITAL ENCOUNTER (OUTPATIENT)
Dept: PULMONOLOGY | Age: 83
Discharge: HOME | End: 2018-07-05
Payer: MEDICARE

## 2018-07-05 DIAGNOSIS — J16.8 PNEUMONIA DUE TO OTHER SPECIFIED INFECTIOUS ORGANISMS: ICD-10-CM

## 2018-07-05 DIAGNOSIS — M05.10 RHEUMATOID LUNG (HCC): ICD-10-CM

## 2018-07-05 PROCEDURE — 94726 PLETHYSMOGRAPHY LUNG VOLUMES: CPT

## 2018-07-05 PROCEDURE — 94729 DIFFUSING CAPACITY: CPT

## 2018-07-05 PROCEDURE — 94060 EVALUATION OF WHEEZING: CPT

## 2018-07-12 ENCOUNTER — OFFICE VISIT (OUTPATIENT)
Dept: PULMONOLOGY | Age: 83
End: 2018-07-12
Payer: MEDICARE

## 2018-07-12 VITALS
HEIGHT: 62 IN | SYSTOLIC BLOOD PRESSURE: 110 MMHG | WEIGHT: 167 LBS | DIASTOLIC BLOOD PRESSURE: 72 MMHG | BODY MASS INDEX: 30.73 KG/M2 | OXYGEN SATURATION: 94 % | TEMPERATURE: 98.9 F | HEART RATE: 90 BPM

## 2018-07-12 DIAGNOSIS — M05.10 RHEUMATOID LUNG (HCC): ICD-10-CM

## 2018-07-12 PROCEDURE — 99214 OFFICE O/P EST MOD 30 MIN: CPT | Performed by: INTERNAL MEDICINE

## 2018-07-12 RX ORDER — IPRATROPIUM BROMIDE AND ALBUTEROL SULFATE 2.5; .5 MG/3ML; MG/3ML
1 SOLUTION RESPIRATORY (INHALATION) EVERY 4 HOURS
Qty: 360 ML | Refills: 11 | Status: SHIPPED | OUTPATIENT
Start: 2018-07-12 | End: 2018-07-23 | Stop reason: SDUPTHER

## 2018-07-12 RX ORDER — TRAMADOL HYDROCHLORIDE 50 MG/1
50 TABLET ORAL EVERY 6 HOURS PRN
COMMUNITY
End: 2018-08-09 | Stop reason: SDUPTHER

## 2018-07-12 ASSESSMENT — ENCOUNTER SYMPTOMS
SHORTNESS OF BREATH: 1
VOICE CHANGE: 0

## 2018-07-12 NOTE — PROGRESS NOTES
nebulizer solution   Stable condition, stable PFTs--DLCO/TLC  Chronic respiratory failure with Hypoxia        Plan:      Continue supplemental oxygen 3 lpm at rest and 4 lpm with activity  Flu vaccine in the fall  Refill Duoneb use prn  RTC 1 year with PFTs

## 2018-07-13 ENCOUNTER — TELEPHONE (OUTPATIENT)
Dept: PULMONOLOGY | Age: 83
End: 2018-07-13

## 2018-07-13 NOTE — TELEPHONE ENCOUNTER
The letter is in the media. It is due to medicare part D.  You can file an appeal on the medication, the form is in media

## 2018-07-16 ENCOUNTER — TELEPHONE (OUTPATIENT)
Dept: PULMONOLOGY | Age: 83
End: 2018-07-16

## 2018-07-16 DIAGNOSIS — M05.10 RHEUMATOID LUNG (HCC): Primary | ICD-10-CM

## 2018-07-17 RX ORDER — IPRATROPIUM BROMIDE AND ALBUTEROL SULFATE 2.5; .5 MG/3ML; MG/3ML
3 SOLUTION RESPIRATORY (INHALATION) EVERY 6 HOURS PRN
Qty: 360 ML | Refills: 3 | Status: SHIPPED | OUTPATIENT
Start: 2018-07-17 | End: 2019-07-18 | Stop reason: SDUPTHER

## 2018-07-23 DIAGNOSIS — M05.10 RHEUMATOID LUNG (HCC): ICD-10-CM

## 2018-07-23 RX ORDER — IPRATROPIUM BROMIDE AND ALBUTEROL SULFATE 2.5; .5 MG/3ML; MG/3ML
1 SOLUTION RESPIRATORY (INHALATION) EVERY 4 HOURS
Qty: 360 ML | Refills: 11 | Status: SHIPPED | OUTPATIENT
Start: 2018-07-23 | End: 2018-08-23 | Stop reason: SDUPTHER

## 2018-08-09 DIAGNOSIS — M06.9 RHEUMATOID ARTHRITIS, INVOLVING UNSPECIFIED SITE, UNSPECIFIED RHEUMATOID FACTOR PRESENCE: Primary | ICD-10-CM

## 2018-08-09 RX ORDER — TRAMADOL HYDROCHLORIDE 50 MG/1
50 TABLET ORAL EVERY 6 HOURS PRN
Qty: 30 TABLET | Refills: 0 | Status: SHIPPED | OUTPATIENT
Start: 2018-08-09 | End: 2018-09-06 | Stop reason: SDUPTHER

## 2018-08-14 ENCOUNTER — HOSPITAL ENCOUNTER (OUTPATIENT)
Age: 83
Discharge: HOME | End: 2018-08-14
Payer: MEDICARE

## 2018-08-14 ENCOUNTER — HOSPITAL ENCOUNTER (OUTPATIENT)
Dept: GENERAL RADIOLOGY | Age: 83
Discharge: HOME | End: 2018-08-14
Payer: MEDICARE

## 2018-08-14 DIAGNOSIS — M15.9 GENERALIZED OSTEOARTHROSIS, INVOLVING MULTIPLE SITES: ICD-10-CM

## 2018-08-14 DIAGNOSIS — L40.59 POLYARTICULAR PSORIATIC ARTHRITIS (HCC): ICD-10-CM

## 2018-08-14 DIAGNOSIS — D64.9 ANEMIA, UNSPECIFIED TYPE: ICD-10-CM

## 2018-08-14 DIAGNOSIS — L40.9 GENERALIZED PSORIASIS: ICD-10-CM

## 2018-08-14 LAB
HBV SURFACE AB TITR SER: POSITIVE {TITER}
HEPATITIS B CORE IGM ANTIBODY: NEGATIVE
HEPATITIS B SURFACE ANTIGEN: NEGATIVE
HEPATITIS C ANTIBODY: NEGATIVE

## 2018-08-14 PROCEDURE — 86480 TB TEST CELL IMMUN MEASURE: CPT

## 2018-08-14 PROCEDURE — 86803 HEPATITIS C AB TEST: CPT

## 2018-08-14 PROCEDURE — 86705 HEP B CORE ANTIBODY IGM: CPT

## 2018-08-14 PROCEDURE — 36415 COLL VENOUS BLD VENIPUNCTURE: CPT

## 2018-08-14 PROCEDURE — 86706 HEP B SURFACE ANTIBODY: CPT

## 2018-08-14 PROCEDURE — 71046 X-RAY EXAM CHEST 2 VIEWS: CPT

## 2018-08-14 PROCEDURE — 87340 HEPATITIS B SURFACE AG IA: CPT

## 2018-08-17 LAB
QUANTIFERON (R) TB GOLD (INCUBATED): NEGATIVE
QUANTIFERON MITOGEN MINUS NIL: > 10 IU/ML
QUANTIFERON NIL: 0.02 IU/ML
QUANTIFERON TB AG MINUS NIL: 0 IU/ML (ref 0–0.34)

## 2018-08-23 ENCOUNTER — OFFICE VISIT (OUTPATIENT)
Dept: FAMILY MEDICINE CLINIC | Age: 83
End: 2018-08-23

## 2018-08-23 VITALS
HEIGHT: 62 IN | WEIGHT: 168.8 LBS | DIASTOLIC BLOOD PRESSURE: 74 MMHG | RESPIRATION RATE: 20 BRPM | BODY MASS INDEX: 31.06 KG/M2 | HEART RATE: 72 BPM | SYSTOLIC BLOOD PRESSURE: 118 MMHG

## 2018-08-23 DIAGNOSIS — E11.69 TYPE 2 DIABETES MELLITUS WITH OTHER SPECIFIED COMPLICATION, UNSPECIFIED WHETHER LONG TERM INSULIN USE (HCC): Primary | ICD-10-CM

## 2018-08-23 DIAGNOSIS — I10 ESSENTIAL HYPERTENSION: ICD-10-CM

## 2018-08-23 DIAGNOSIS — E78.5 HYPERLIPIDEMIA, UNSPECIFIED HYPERLIPIDEMIA TYPE: ICD-10-CM

## 2018-08-23 DIAGNOSIS — M05.10 RHEUMATOID LUNG (HCC): ICD-10-CM

## 2018-08-23 LAB
GLUCOSE BLD-MCNC: 122 MG/DL
HBA1C MFR BLD: 6.6 %

## 2018-08-23 PROCEDURE — 99213 OFFICE O/P EST LOW 20 MIN: CPT | Performed by: EMERGENCY MEDICINE

## 2018-08-23 PROCEDURE — 83036 HEMOGLOBIN GLYCOSYLATED A1C: CPT | Performed by: EMERGENCY MEDICINE

## 2018-08-23 PROCEDURE — 82962 GLUCOSE BLOOD TEST: CPT | Performed by: EMERGENCY MEDICINE

## 2018-08-23 ASSESSMENT — ENCOUNTER SYMPTOMS
CHEST TIGHTNESS: 0
VISUAL CHANGE: 0
SHORTNESS OF BREATH: 1

## 2018-09-06 DIAGNOSIS — M06.9 RHEUMATOID ARTHRITIS, INVOLVING UNSPECIFIED SITE, UNSPECIFIED RHEUMATOID FACTOR PRESENCE: ICD-10-CM

## 2018-09-07 RX ORDER — TRAMADOL HYDROCHLORIDE 50 MG/1
50 TABLET ORAL 3 TIMES DAILY PRN
Qty: 30 TABLET | Refills: 0 | Status: SHIPPED | OUTPATIENT
Start: 2018-09-07 | End: 2018-10-07

## 2018-09-07 NOTE — TELEPHONE ENCOUNTER
Pt called and said that she has 6 pills left and she takes once a day and would like to know if Dr Shannan Figueroa could fill when he is in on Tuesday please.

## 2018-09-10 RX ORDER — LOSARTAN POTASSIUM AND HYDROCHLOROTHIAZIDE 12.5; 5 MG/1; MG/1
TABLET ORAL
Qty: 90 TABLET | Refills: 1 | Status: SHIPPED | OUTPATIENT
Start: 2018-09-10 | End: 2019-03-09 | Stop reason: SDUPTHER

## 2018-09-10 NOTE — TELEPHONE ENCOUNTER
The pharmacy is requesting a refill of the below medication which has been pended for you:     Requested Prescriptions     Pending Prescriptions Disp Refills    losartan-hydrochlorothiazide (HYZAAR) 50-12.5 MG per tablet [Pharmacy Med Name: LOSARTAN/HCTZ TABS 50/12.5] 90 tablet 1     Sig: TAKE 1 TABLET DAILY       Last Appointment Date: 8/23/2018  Next Appointment Date: 11/27/2018    Allergies   Allergen Reactions    Sulfa Antibiotics Nausea Only    Amoxicillin Itching and Rash

## 2018-09-25 RX ORDER — PANTOPRAZOLE SODIUM 40 MG/1
TABLET, DELAYED RELEASE ORAL
Qty: 90 TABLET | Refills: 1 | Status: SHIPPED | OUTPATIENT
Start: 2018-09-25 | End: 2019-03-24 | Stop reason: SDUPTHER

## 2018-10-06 ENCOUNTER — CARE COORDINATION (OUTPATIENT)
Dept: CASE MANAGEMENT | Age: 83
End: 2018-10-06

## 2018-10-11 DIAGNOSIS — M06.9 RHEUMATOID ARTHRITIS INVOLVING MULTIPLE SITES, UNSPECIFIED RHEUMATOID FACTOR PRESENCE: ICD-10-CM

## 2018-10-11 DIAGNOSIS — M06.9 RHEUMATOID ARTHRITIS, INVOLVING UNSPECIFIED SITE, UNSPECIFIED RHEUMATOID FACTOR PRESENCE: ICD-10-CM

## 2018-10-11 DIAGNOSIS — G89.4 CHRONIC PAIN SYNDROME: ICD-10-CM

## 2018-10-11 RX ORDER — TRAMADOL HYDROCHLORIDE 50 MG/1
50 TABLET ORAL EVERY 6 HOURS PRN
Qty: 30 TABLET | Refills: 0 | Status: CANCELLED | OUTPATIENT
Start: 2018-10-11 | End: 2018-10-19

## 2018-10-11 RX ORDER — TRAMADOL HYDROCHLORIDE 100 MG/1
100 TABLET, FILM COATED, EXTENDED RELEASE ORAL 2 TIMES DAILY PRN
Qty: 30 TABLET | Refills: 0 | Status: SHIPPED | OUTPATIENT
Start: 2018-10-11 | End: 2018-11-13 | Stop reason: SDUPTHER

## 2018-10-11 NOTE — TELEPHONE ENCOUNTER
Philly Boyer called requesting a refill of the below medication which has been pended for you:     Requested Prescriptions     Pending Prescriptions Disp Refills    traMADol (ULTRAM) 50 MG tablet 30 tablet 0     Sig: Take 1 tablet by mouth every 6 hours as needed for Pain for up to 8 days. .       Last Appointment Date: 8/23/2018  Next Appointment Date: 11/27/2018    Allergies   Allergen Reactions    Sulfa Antibiotics Nausea Only    Amoxicillin Itching and Rash

## 2018-11-05 ENCOUNTER — OFFICE VISIT (OUTPATIENT)
Dept: CARDIOLOGY CLINIC | Age: 83
End: 2018-11-05
Payer: MEDICARE

## 2018-11-05 VITALS
HEART RATE: 85 BPM | BODY MASS INDEX: 31.47 KG/M2 | WEIGHT: 171 LBS | HEIGHT: 62 IN | DIASTOLIC BLOOD PRESSURE: 62 MMHG | SYSTOLIC BLOOD PRESSURE: 116 MMHG

## 2018-11-05 DIAGNOSIS — I10 ESSENTIAL HYPERTENSION: ICD-10-CM

## 2018-11-05 DIAGNOSIS — R09.02 HYPOXIA: ICD-10-CM

## 2018-11-05 DIAGNOSIS — E78.01 FAMILIAL HYPERCHOLESTEROLEMIA: ICD-10-CM

## 2018-11-05 DIAGNOSIS — J84.10 PULMONARY FIBROSIS (HCC): ICD-10-CM

## 2018-11-05 DIAGNOSIS — I25.10 CORONARY ARTERY DISEASE INVOLVING NATIVE CORONARY ARTERY WITHOUT ANGINA PECTORIS, UNSPECIFIED WHETHER NATIVE OR TRANSPLANTED HEART: Primary | ICD-10-CM

## 2018-11-05 PROCEDURE — 93000 ELECTROCARDIOGRAM COMPLETE: CPT | Performed by: NUCLEAR MEDICINE

## 2018-11-05 PROCEDURE — 99213 OFFICE O/P EST LOW 20 MIN: CPT | Performed by: NUCLEAR MEDICINE

## 2018-11-08 RX ORDER — ATORVASTATIN CALCIUM 40 MG/1
TABLET, FILM COATED ORAL
Qty: 90 TABLET | Refills: 3 | Status: SHIPPED | OUTPATIENT
Start: 2018-11-08 | End: 2019-11-03 | Stop reason: SDUPTHER

## 2018-11-13 DIAGNOSIS — M06.9 RHEUMATOID ARTHRITIS, INVOLVING UNSPECIFIED SITE, UNSPECIFIED RHEUMATOID FACTOR PRESENCE: ICD-10-CM

## 2018-11-13 DIAGNOSIS — M06.9 RHEUMATOID ARTHRITIS INVOLVING MULTIPLE SITES, UNSPECIFIED RHEUMATOID FACTOR PRESENCE: ICD-10-CM

## 2018-11-13 DIAGNOSIS — G89.4 CHRONIC PAIN SYNDROME: ICD-10-CM

## 2018-11-13 RX ORDER — TRAMADOL HYDROCHLORIDE 100 MG/1
100 TABLET, FILM COATED, EXTENDED RELEASE ORAL 2 TIMES DAILY PRN
Qty: 30 TABLET | Refills: 2 | Status: SHIPPED | OUTPATIENT
Start: 2018-11-13 | End: 2018-12-11 | Stop reason: SDUPTHER

## 2018-12-11 ENCOUNTER — OFFICE VISIT (OUTPATIENT)
Dept: FAMILY MEDICINE CLINIC | Age: 83
End: 2018-12-11

## 2018-12-11 VITALS
HEIGHT: 62 IN | DIASTOLIC BLOOD PRESSURE: 58 MMHG | RESPIRATION RATE: 16 BRPM | BODY MASS INDEX: 30.44 KG/M2 | SYSTOLIC BLOOD PRESSURE: 110 MMHG | HEART RATE: 56 BPM | WEIGHT: 165.4 LBS

## 2018-12-11 DIAGNOSIS — M06.9 RHEUMATOID ARTHRITIS INVOLVING MULTIPLE SITES, UNSPECIFIED RHEUMATOID FACTOR PRESENCE: ICD-10-CM

## 2018-12-11 DIAGNOSIS — G89.4 CHRONIC PAIN SYNDROME: ICD-10-CM

## 2018-12-11 DIAGNOSIS — I25.10 CORONARY ARTERY DISEASE INVOLVING NATIVE CORONARY ARTERY WITHOUT ANGINA PECTORIS, UNSPECIFIED WHETHER NATIVE OR TRANSPLANTED HEART: ICD-10-CM

## 2018-12-11 DIAGNOSIS — M06.9 RHEUMATOID ARTHRITIS, INVOLVING UNSPECIFIED SITE, UNSPECIFIED RHEUMATOID FACTOR PRESENCE: ICD-10-CM

## 2018-12-11 DIAGNOSIS — E78.5 HYPERLIPIDEMIA, UNSPECIFIED HYPERLIPIDEMIA TYPE: ICD-10-CM

## 2018-12-11 DIAGNOSIS — I10 ESSENTIAL HYPERTENSION: ICD-10-CM

## 2018-12-11 DIAGNOSIS — E11.69 TYPE 2 DIABETES MELLITUS WITH OTHER SPECIFIED COMPLICATION, UNSPECIFIED WHETHER LONG TERM INSULIN USE (HCC): Primary | ICD-10-CM

## 2018-12-11 LAB
GLUCOSE BLD-MCNC: 147 MG/DL
HBA1C MFR BLD: 5.6 %

## 2018-12-11 PROCEDURE — 99214 OFFICE O/P EST MOD 30 MIN: CPT | Performed by: EMERGENCY MEDICINE

## 2018-12-11 PROCEDURE — 82962 GLUCOSE BLOOD TEST: CPT | Performed by: EMERGENCY MEDICINE

## 2018-12-11 PROCEDURE — 83036 HEMOGLOBIN GLYCOSYLATED A1C: CPT | Performed by: EMERGENCY MEDICINE

## 2018-12-11 RX ORDER — TRAMADOL HYDROCHLORIDE 100 MG/1
100 TABLET, FILM COATED, EXTENDED RELEASE ORAL 2 TIMES DAILY PRN
Qty: 30 TABLET | Refills: 2 | Status: SHIPPED | OUTPATIENT
Start: 2018-12-11 | End: 2019-02-09

## 2018-12-11 RX ORDER — ETANERCEPT 50 MG/ML
SOLUTION SUBCUTANEOUS
COMMUNITY
Start: 2018-11-09 | End: 2018-12-11 | Stop reason: SDUPTHER

## 2018-12-11 ASSESSMENT — ENCOUNTER SYMPTOMS
SINUS PRESSURE: 0
COUGH: 0
WHEEZING: 0
VOMITING: 0
SORE THROAT: 0
CHEST TIGHTNESS: 0
VOICE CHANGE: 0
DIARRHEA: 0
VISUAL CHANGE: 0
SHORTNESS OF BREATH: 1
NAUSEA: 0
ABDOMINAL PAIN: 0
TROUBLE SWALLOWING: 0
BACK PAIN: 0
RHINORRHEA: 0
CONSTIPATION: 0

## 2018-12-11 ASSESSMENT — PATIENT HEALTH QUESTIONNAIRE - PHQ9
1. LITTLE INTEREST OR PLEASURE IN DOING THINGS: 0
SUM OF ALL RESPONSES TO PHQ9 QUESTIONS 1 & 2: 0
SUM OF ALL RESPONSES TO PHQ QUESTIONS 1-9: 0
2. FEELING DOWN, DEPRESSED OR HOPELESS: 0
SUM OF ALL RESPONSES TO PHQ QUESTIONS 1-9: 0

## 2018-12-11 NOTE — PROGRESS NOTES
headaches. Psychiatric/Behavioral: The patient is not nervous/anxious. Objective:     BP (!) 110/58 (Site: Right Upper Arm, Position: Sitting, Cuff Size: Medium Adult)   Pulse 56   Resp 16   Ht 5' 2\" (1.575 m)   Wt 165 lb 6.4 oz (75 kg)   Breastfeeding? No   BMI 30.25 kg/m²   BP Readings from Last 3 Encounters:   12/11/18 (!) 110/58   11/05/18 116/62   08/23/18 118/74     Wt Readings from Last 3 Encounters:   12/11/18 165 lb 6.4 oz (75 kg)   11/05/18 171 lb (77.6 kg)   08/23/18 168 lb 12.8 oz (76.6 kg)       Physical Exam   Constitutional: She is oriented to person, place, and time. She appears well-developed and well-nourished. She is cooperative. HENT:   Head: Normocephalic and atraumatic. Right Ear: External ear normal.   Left Ear: External ear normal.   Nose: Nose normal.   Mouth/Throat: Oropharynx is clear and moist.   Eyes: Pupils are equal, round, and reactive to light. Conjunctivae and EOM are normal. No scleral icterus. Neck: Normal range of motion. Neck supple. No JVD present. No thyromegaly present. Cardiovascular: Normal rate, regular rhythm and intact distal pulses. Exam reveals no friction rub. No murmur heard. Pulmonary/Chest: Effort normal and breath sounds normal. She has no wheezes. She has no rales. She exhibits no tenderness. Abdominal: Soft. Bowel sounds are normal. She exhibits no mass. There is no tenderness. Musculoskeletal: She exhibits no edema. Lymphadenopathy:     She has no cervical adenopathy. Neurological: She is alert and oriented to person, place, and time. Skin: No rash noted. Vitals reviewed. Assessment:       Diagnosis Orders   1. Type 2 diabetes mellitus with other specified complication, unspecified whether long term insulin use (HCC)  POCT glycosylated hemoglobin (Hb A1C)    POCT Glucose   2.  Rheumatoid arthritis involving multiple sites, unspecified rheumatoid factor presence (HCC)  traMADol (ULTRAM ER) 100 MG TB24 extended release benefit, and side effectsof prescribed medications. All patient questions answered. Pt voiced understanding. Instructed to continue current medications, diet and exercise. Patient agreedwith treatment plan.

## 2018-12-15 LAB
ALBUMIN SERPL-MCNC: 4 G/DL (ref 3.5–5.2)
ALK PHOSPHATASE: 66 U/L (ref 30–146)
ALT SERPL-CCNC: 11 U/L (ref 5–40)
ANION GAP SERPL CALCULATED.3IONS-SCNC: 12 MEQ/L (ref 10–19)
AST SERPL-CCNC: 18 U/L (ref 9–40)
BILIRUB SERPL-MCNC: 0.5 MG/DL
BUN BLDV-MCNC: 16 MG/DL (ref 8–23)
CALCIUM SERPL-MCNC: 10.1 MG/DL (ref 8.5–10.5)
CHLORIDE BLD-SCNC: 98 MEQ/L (ref 95–107)
CHOLESTEROL/HDL RATIO: 2.3
CHOLESTEROL: 138 MG/DL
CO2: 32 MEQ/L (ref 19–31)
CREAT SERPL-MCNC: 0.9 MG/DL (ref 0.6–1.3)
EGFR AFRICAN AMERICAN: 68.5 ML/MIN/1.73 M2
EGFR IF NONAFRICAN AMERICAN: 59.1 ML/MIN/1.73 M2
GLUCOSE: 130 MG/DL (ref 70–99)
HDLC SERPL-MCNC: 59.4 MG/DL
LDL CHOLESTEROL CALCULATED: 58 MG/DL
LDL/HDL RATIO: 1
POTASSIUM SERPL-SCNC: 3.8 MEQ/L (ref 3.5–5.4)
SODIUM BLD-SCNC: 142 MEQ/L (ref 135–146)
TOTAL PROTEIN: 7.3 G/DL (ref 6.1–8.3)
TRIGL SERPL-MCNC: 102 MG/DL
VLDLC SERPL CALC-MCNC: 20 MG/DL

## 2018-12-20 ENCOUNTER — TELEPHONE (OUTPATIENT)
Dept: FAMILY MEDICINE CLINIC | Age: 83
End: 2018-12-20

## 2019-01-14 RX ORDER — METOPROLOL SUCCINATE 50 MG/1
TABLET, EXTENDED RELEASE ORAL
Qty: 135 TABLET | Refills: 3 | Status: SHIPPED | OUTPATIENT
Start: 2019-01-14 | End: 2019-11-04 | Stop reason: SDUPTHER

## 2019-03-11 RX ORDER — LOSARTAN POTASSIUM AND HYDROCHLOROTHIAZIDE 12.5; 5 MG/1; MG/1
TABLET ORAL
Qty: 90 TABLET | Refills: 1 | Status: SHIPPED | OUTPATIENT
Start: 2019-03-11 | End: 2019-09-07 | Stop reason: SDUPTHER

## 2019-03-19 ENCOUNTER — OFFICE VISIT (OUTPATIENT)
Dept: FAMILY MEDICINE CLINIC | Age: 84
End: 2019-03-19

## 2019-03-19 VITALS
WEIGHT: 168.2 LBS | SYSTOLIC BLOOD PRESSURE: 128 MMHG | HEART RATE: 84 BPM | HEIGHT: 62 IN | RESPIRATION RATE: 16 BRPM | BODY MASS INDEX: 30.95 KG/M2 | DIASTOLIC BLOOD PRESSURE: 72 MMHG

## 2019-03-19 DIAGNOSIS — E78.5 HYPERLIPIDEMIA, UNSPECIFIED HYPERLIPIDEMIA TYPE: ICD-10-CM

## 2019-03-19 DIAGNOSIS — M06.9 RHEUMATOID ARTHRITIS, INVOLVING UNSPECIFIED SITE, UNSPECIFIED RHEUMATOID FACTOR PRESENCE: ICD-10-CM

## 2019-03-19 DIAGNOSIS — I10 ESSENTIAL HYPERTENSION: ICD-10-CM

## 2019-03-19 DIAGNOSIS — J84.10 LUNG FIBROSIS (HCC): ICD-10-CM

## 2019-03-19 DIAGNOSIS — E11.9 TYPE 2 DIABETES MELLITUS WITHOUT COMPLICATION, WITHOUT LONG-TERM CURRENT USE OF INSULIN (HCC): Primary | ICD-10-CM

## 2019-03-19 LAB
GLUCOSE BLD-MCNC: 139 MG/DL
HBA1C MFR BLD: 5.7 %

## 2019-03-19 PROCEDURE — 83036 HEMOGLOBIN GLYCOSYLATED A1C: CPT | Performed by: EMERGENCY MEDICINE

## 2019-03-19 PROCEDURE — 99213 OFFICE O/P EST LOW 20 MIN: CPT | Performed by: EMERGENCY MEDICINE

## 2019-03-19 PROCEDURE — 82962 GLUCOSE BLOOD TEST: CPT | Performed by: EMERGENCY MEDICINE

## 2019-03-19 ASSESSMENT — ENCOUNTER SYMPTOMS
NAUSEA: 0
VOICE CHANGE: 0
VOMITING: 0
DIARRHEA: 0
COUGH: 0
SINUS PRESSURE: 0
SORE THROAT: 0
CHEST TIGHTNESS: 0
CONSTIPATION: 0
SHORTNESS OF BREATH: 0
RHINORRHEA: 0
WHEEZING: 0
VISUAL CHANGE: 0
TROUBLE SWALLOWING: 0
BACK PAIN: 1
ABDOMINAL PAIN: 0

## 2019-03-19 ASSESSMENT — PATIENT HEALTH QUESTIONNAIRE - PHQ9
SUM OF ALL RESPONSES TO PHQ9 QUESTIONS 1 & 2: 0
1. LITTLE INTEREST OR PLEASURE IN DOING THINGS: 0
2. FEELING DOWN, DEPRESSED OR HOPELESS: 0
SUM OF ALL RESPONSES TO PHQ QUESTIONS 1-9: 0
SUM OF ALL RESPONSES TO PHQ QUESTIONS 1-9: 0

## 2019-03-25 RX ORDER — PANTOPRAZOLE SODIUM 40 MG/1
TABLET, DELAYED RELEASE ORAL
Qty: 90 TABLET | Refills: 1 | Status: SHIPPED | OUTPATIENT
Start: 2019-03-25 | End: 2019-09-21 | Stop reason: SDUPTHER

## 2019-06-18 ENCOUNTER — HOSPITAL ENCOUNTER (OUTPATIENT)
Dept: WOMENS IMAGING | Age: 84
Discharge: HOME | End: 2019-06-18
Payer: MEDICARE

## 2019-06-18 DIAGNOSIS — Z12.31 VISIT FOR SCREENING MAMMOGRAM: ICD-10-CM

## 2019-06-18 PROCEDURE — 77063 BREAST TOMOSYNTHESIS BI: CPT

## 2019-07-09 ENCOUNTER — OFFICE VISIT (OUTPATIENT)
Dept: FAMILY MEDICINE CLINIC | Age: 84
End: 2019-07-09

## 2019-07-09 VITALS — HEIGHT: 62 IN | BODY MASS INDEX: 30.76 KG/M2

## 2019-07-09 DIAGNOSIS — E78.5 HYPERLIPIDEMIA, UNSPECIFIED HYPERLIPIDEMIA TYPE: ICD-10-CM

## 2019-07-09 DIAGNOSIS — J96.10 CHRONIC RESPIRATORY FAILURE, UNSPECIFIED WHETHER WITH HYPOXIA OR HYPERCAPNIA (HCC): ICD-10-CM

## 2019-07-09 DIAGNOSIS — J84.10 LUNG FIBROSIS (HCC): ICD-10-CM

## 2019-07-09 DIAGNOSIS — E11.69 TYPE 2 DIABETES MELLITUS WITH OTHER SPECIFIED COMPLICATION, UNSPECIFIED WHETHER LONG TERM INSULIN USE (HCC): Primary | ICD-10-CM

## 2019-07-09 DIAGNOSIS — I10 ESSENTIAL HYPERTENSION: ICD-10-CM

## 2019-07-09 DIAGNOSIS — M05.10 RHEUMATOID LUNG (HCC): ICD-10-CM

## 2019-07-09 LAB
CHP ED QC CHECK: ABNORMAL
GLUCOSE BLD-MCNC: 152 MG/DL
HBA1C MFR BLD: 6.5 %

## 2019-07-09 PROCEDURE — 82962 GLUCOSE BLOOD TEST: CPT | Performed by: EMERGENCY MEDICINE

## 2019-07-09 PROCEDURE — 83036 HEMOGLOBIN GLYCOSYLATED A1C: CPT | Performed by: EMERGENCY MEDICINE

## 2019-07-09 PROCEDURE — 99213 OFFICE O/P EST LOW 20 MIN: CPT | Performed by: EMERGENCY MEDICINE

## 2019-07-09 ASSESSMENT — ENCOUNTER SYMPTOMS
BACK PAIN: 0
CONSTIPATION: 0
SINUS PRESSURE: 0
COUGH: 1
VOMITING: 0
RHINORRHEA: 0
NAUSEA: 0
TROUBLE SWALLOWING: 0
VOICE CHANGE: 0
ABDOMINAL PAIN: 0
CHEST TIGHTNESS: 0
SORE THROAT: 0
VISUAL CHANGE: 0
DIARRHEA: 0
SHORTNESS OF BREATH: 0
WHEEZING: 0

## 2019-07-10 ENCOUNTER — HOSPITAL ENCOUNTER (OUTPATIENT)
Dept: PULMONOLOGY | Age: 84
Discharge: HOME | End: 2019-07-10
Payer: MEDICARE

## 2019-07-10 DIAGNOSIS — M05.10 RHEUMATOID LUNG (HCC): ICD-10-CM

## 2019-07-10 PROCEDURE — 94729 DIFFUSING CAPACITY: CPT

## 2019-07-10 PROCEDURE — 94060 EVALUATION OF WHEEZING: CPT

## 2019-07-10 PROCEDURE — 94726 PLETHYSMOGRAPHY LUNG VOLUMES: CPT

## 2019-07-10 PROCEDURE — 2709999900 HC NON-CHARGEABLE SUPPLY

## 2019-07-14 LAB
ALBUMIN SERPL-MCNC: 4 G/DL (ref 3.2–5.3)
ALK PHOSPHATASE: 70 U/L (ref 39–130)
ALT SERPL-CCNC: 13 U/L (ref 0–31)
ANION GAP SERPL CALCULATED.3IONS-SCNC: 9 MMOL/L (ref 4–12)
AST SERPL-CCNC: 20 U/L (ref 0–41)
BILIRUB SERPL-MCNC: 0.6 MG/DL (ref 0.3–1.2)
BUN BLDV-MCNC: 22 MG/DL (ref 5–27)
CALCIUM SERPL-MCNC: 9.7 MG/DL (ref 8.5–10.5)
CHLORIDE BLD-SCNC: 101 MMOL/L (ref 98–109)
CHOLESTEROL/HDL RATIO: 2 (ref 1–5)
CHOLESTEROL: 160 MG/DL (ref 150–200)
CO2: 30 MMOL/L (ref 22–32)
CREAT SERPL-MCNC: 0.79 MG/DL (ref 0.4–1)
EGFR AFRICAN AMERICAN: >60 ML/MIN/1.73SQ.M
EGFR IF NONAFRICAN AMERICAN: >60 ML/MIN/1.73SQ.M
GLUCOSE: 131 MG/DL (ref 65–99)
HDLC SERPL-MCNC: 79 MG/DL
LDL CHOLESTEROL CALCULATED: 60 MG/DL
LDL/HDL RATIO: 0.8
POTASSIUM SERPL-SCNC: 4.2 MMOL/L (ref 3.5–5)
SODIUM BLD-SCNC: 140 MMOL/L (ref 134–146)
TOTAL PROTEIN: 7.3 G/DL (ref 6–8)
TRIGL SERPL-MCNC: 107 MG/DL (ref 27–150)
VLDLC SERPL CALC-MCNC: 21 MG/DL (ref 0–30)

## 2019-07-15 ENCOUNTER — TELEPHONE (OUTPATIENT)
Dept: FAMILY MEDICINE CLINIC | Age: 84
End: 2019-07-15

## 2019-07-18 ENCOUNTER — OFFICE VISIT (OUTPATIENT)
Dept: PULMONOLOGY | Age: 84
End: 2019-07-18
Payer: MEDICARE

## 2019-07-18 VITALS
BODY MASS INDEX: 33.75 KG/M2 | SYSTOLIC BLOOD PRESSURE: 127 MMHG | WEIGHT: 183.4 LBS | HEART RATE: 88 BPM | TEMPERATURE: 97.7 F | HEIGHT: 62 IN | OXYGEN SATURATION: 93 % | DIASTOLIC BLOOD PRESSURE: 77 MMHG

## 2019-07-18 DIAGNOSIS — M05.10 RHEUMATOID LUNG (HCC): ICD-10-CM

## 2019-07-18 DIAGNOSIS — J47.9 BRONCHIECTASIS WITHOUT COMPLICATION (HCC): Primary | ICD-10-CM

## 2019-07-18 DIAGNOSIS — J96.10 CHRONIC RESPIRATORY FAILURE, UNSPECIFIED WHETHER WITH HYPOXIA OR HYPERCAPNIA (HCC): ICD-10-CM

## 2019-07-18 PROCEDURE — 99214 OFFICE O/P EST MOD 30 MIN: CPT | Performed by: INTERNAL MEDICINE

## 2019-07-18 RX ORDER — IPRATROPIUM BROMIDE AND ALBUTEROL SULFATE 2.5; .5 MG/3ML; MG/3ML
3 SOLUTION RESPIRATORY (INHALATION) EVERY 6 HOURS PRN
Qty: 360 ML | Refills: 3 | Status: SHIPPED | OUTPATIENT
Start: 2019-07-18 | End: 2019-07-23 | Stop reason: SDUPTHER

## 2019-07-18 ASSESSMENT — ENCOUNTER SYMPTOMS
SHORTNESS OF BREATH: 1
VOICE CHANGE: 0

## 2019-07-23 DIAGNOSIS — M05.10 RHEUMATOID LUNG (HCC): ICD-10-CM

## 2019-07-23 RX ORDER — IPRATROPIUM BROMIDE AND ALBUTEROL SULFATE 2.5; .5 MG/3ML; MG/3ML
3 SOLUTION RESPIRATORY (INHALATION) EVERY 6 HOURS PRN
Qty: 360 ML | Refills: 3 | Status: SHIPPED | OUTPATIENT
Start: 2019-07-23 | End: 2020-03-24

## 2019-07-31 ASSESSMENT — PATIENT HEALTH QUESTIONNAIRE - PHQ9
SUM OF ALL RESPONSES TO PHQ QUESTIONS 1-9: 0
1. LITTLE INTEREST OR PLEASURE IN DOING THINGS: 0
SUM OF ALL RESPONSES TO PHQ9 QUESTIONS 1 & 2: 0
2. FEELING DOWN, DEPRESSED OR HOPELESS: 0
SUM OF ALL RESPONSES TO PHQ QUESTIONS 1-9: 0

## 2019-09-10 RX ORDER — LOSARTAN POTASSIUM AND HYDROCHLOROTHIAZIDE 12.5; 5 MG/1; MG/1
TABLET ORAL
Qty: 90 TABLET | Refills: 4 | Status: SHIPPED | OUTPATIENT
Start: 2019-09-10 | End: 2019-09-16 | Stop reason: SDUPTHER

## 2019-09-16 ENCOUNTER — TELEPHONE (OUTPATIENT)
Dept: FAMILY MEDICINE CLINIC | Age: 84
End: 2019-09-16

## 2019-09-16 RX ORDER — LOSARTAN POTASSIUM AND HYDROCHLOROTHIAZIDE 12.5; 5 MG/1; MG/1
TABLET ORAL
Qty: 30 TABLET | Refills: 0 | Status: SHIPPED | OUTPATIENT
Start: 2019-09-16 | End: 2020-01-13 | Stop reason: SDUPTHER

## 2019-09-23 RX ORDER — PANTOPRAZOLE SODIUM 40 MG/1
TABLET, DELAYED RELEASE ORAL
Qty: 90 TABLET | Refills: 4 | Status: SHIPPED | OUTPATIENT
Start: 2019-09-23 | End: 2020-12-16

## 2019-10-08 ENCOUNTER — OFFICE VISIT (OUTPATIENT)
Dept: FAMILY MEDICINE CLINIC | Age: 84
End: 2019-10-08

## 2019-10-08 VITALS
SYSTOLIC BLOOD PRESSURE: 120 MMHG | WEIGHT: 191.8 LBS | DIASTOLIC BLOOD PRESSURE: 70 MMHG | RESPIRATION RATE: 16 BRPM | BODY MASS INDEX: 35.3 KG/M2 | HEART RATE: 72 BPM | HEIGHT: 62 IN

## 2019-10-08 DIAGNOSIS — E11.69 TYPE 2 DIABETES MELLITUS WITH OTHER SPECIFIED COMPLICATION, UNSPECIFIED WHETHER LONG TERM INSULIN USE (HCC): ICD-10-CM

## 2019-10-08 DIAGNOSIS — E78.5 HYPERLIPIDEMIA, UNSPECIFIED HYPERLIPIDEMIA TYPE: ICD-10-CM

## 2019-10-08 DIAGNOSIS — D84.9 IMMUNOCOMPROMISED (HCC): ICD-10-CM

## 2019-10-08 DIAGNOSIS — I10 ESSENTIAL HYPERTENSION: Primary | ICD-10-CM

## 2019-10-08 DIAGNOSIS — J84.10 LUNG FIBROSIS (HCC): ICD-10-CM

## 2019-10-08 PROCEDURE — 99213 OFFICE O/P EST LOW 20 MIN: CPT | Performed by: EMERGENCY MEDICINE

## 2019-10-08 ASSESSMENT — ENCOUNTER SYMPTOMS
RHINORRHEA: 0
SHORTNESS OF BREATH: 0
CHEST TIGHTNESS: 0
VOICE CHANGE: 0
SORE THROAT: 0
BACK PAIN: 0
NAUSEA: 0
VOMITING: 0
COUGH: 0
VISUAL CHANGE: 0
WHEEZING: 0
CONSTIPATION: 0
ABDOMINAL PAIN: 0
DIARRHEA: 0
TROUBLE SWALLOWING: 0
SINUS PRESSURE: 0

## 2019-11-04 ENCOUNTER — OFFICE VISIT (OUTPATIENT)
Dept: CARDIOLOGY CLINIC | Age: 84
End: 2019-11-04
Payer: MEDICARE

## 2019-11-04 VITALS
WEIGHT: 193 LBS | SYSTOLIC BLOOD PRESSURE: 124 MMHG | HEART RATE: 100 BPM | HEIGHT: 61 IN | DIASTOLIC BLOOD PRESSURE: 74 MMHG | BODY MASS INDEX: 36.44 KG/M2

## 2019-11-04 DIAGNOSIS — J84.10 PULMONARY FIBROSIS (HCC): ICD-10-CM

## 2019-11-04 DIAGNOSIS — I25.10 CORONARY ARTERY DISEASE INVOLVING NATIVE CORONARY ARTERY WITHOUT ANGINA PECTORIS, UNSPECIFIED WHETHER NATIVE OR TRANSPLANTED HEART: ICD-10-CM

## 2019-11-04 DIAGNOSIS — I10 ESSENTIAL HYPERTENSION: Primary | ICD-10-CM

## 2019-11-04 PROCEDURE — 93000 ELECTROCARDIOGRAM COMPLETE: CPT | Performed by: NUCLEAR MEDICINE

## 2019-11-04 PROCEDURE — 99213 OFFICE O/P EST LOW 20 MIN: CPT | Performed by: NUCLEAR MEDICINE

## 2019-11-04 RX ORDER — METOPROLOL SUCCINATE 50 MG/1
TABLET, EXTENDED RELEASE ORAL
Qty: 135 TABLET | Refills: 3 | Status: SHIPPED | OUTPATIENT
Start: 2019-11-04 | End: 2020-11-04 | Stop reason: SDUPTHER

## 2019-11-04 RX ORDER — ATORVASTATIN CALCIUM 40 MG/1
TABLET, FILM COATED ORAL
Qty: 90 TABLET | Refills: 4 | Status: SHIPPED | OUTPATIENT
Start: 2019-11-04 | End: 2020-11-04 | Stop reason: SDUPTHER

## 2019-11-04 RX ORDER — FERROUS SULFATE 325(65) MG
325 TABLET ORAL
COMMUNITY
End: 2020-07-30

## 2019-12-10 ENCOUNTER — TELEPHONE (OUTPATIENT)
Dept: CARDIOLOGY CLINIC | Age: 84
End: 2019-12-10

## 2019-12-13 ENCOUNTER — TELEPHONE (OUTPATIENT)
Dept: FAMILY MEDICINE CLINIC | Age: 84
End: 2019-12-13

## 2020-01-13 ENCOUNTER — HOSPITAL ENCOUNTER (OUTPATIENT)
Dept: CT IMAGING | Age: 85
Discharge: HOME | End: 2020-01-13
Payer: MEDICARE

## 2020-01-13 PROCEDURE — 71250 CT THORAX DX C-: CPT

## 2020-01-13 RX ORDER — LOSARTAN POTASSIUM AND HYDROCHLOROTHIAZIDE 12.5; 5 MG/1; MG/1
TABLET ORAL
Qty: 30 TABLET | Refills: 0 | Status: SHIPPED | OUTPATIENT
Start: 2020-01-13 | End: 2021-02-11

## 2020-01-13 NOTE — TELEPHONE ENCOUNTER
Pt called stating that she got a call from [address] saying that the losartan-hydrochlorothiazide is on back order. She is asking if she would be able to get a short supply of them  called in for her. She has an appointment on 01/15/2020. She only has enough of the combo until Wednesday.        Send to [address] [place] [address] on Erdel may be reached at [phone]

## 2020-01-14 LAB
ABSOLUTE BASO #: 0.1 X10E9/L (ref 0–0.9)
ABSOLUTE EOS #: 0.4 X10E9/L (ref 0–0.4)
ABSOLUTE LYMPH #: 1.2 X10E9/L (ref 1–3.5)
ABSOLUTE MONO #: 0.5 X10E9/L (ref 0–0.9)
ABSOLUTE NEUT #: 4 X10E9/L (ref 1.5–6.6)
ALBUMIN SERPL-MCNC: 4.2 G/DL (ref 3.2–5.3)
ALK PHOSPHATASE: 74 U/L (ref 39–130)
ALT SERPL-CCNC: 14 U/L (ref 0–31)
ANION GAP SERPL CALCULATED.3IONS-SCNC: 12 MMOL/L (ref 4–12)
AST SERPL-CCNC: 24 U/L (ref 0–41)
BASOPHILS RELATIVE PERCENT: 1.1 %
BILIRUB SERPL-MCNC: 0.6 MG/DL (ref 0.3–1.2)
BUN BLDV-MCNC: 20 MG/DL (ref 5–27)
CALCIUM SERPL-MCNC: 10.1 MG/DL (ref 8.5–10.5)
CHLORIDE BLD-SCNC: 100 MMOL/L (ref 98–109)
CHOLESTEROL/HDL RATIO: 2.1 (ref 1–5)
CHOLESTEROL: 161 MG/DL (ref 150–200)
CO2: 29 MMOL/L (ref 22–32)
CREAT SERPL-MCNC: 0.88 MG/DL (ref 0.4–1)
EGFR AFRICAN AMERICAN: >60 ML/MIN/1.73SQ.M
EGFR IF NONAFRICAN AMERICAN: >60 ML/MIN/1.73SQ.M
EOSINOPHILS RELATIVE PERCENT: 6.4 %
GLUCOSE: 152 MG/DL (ref 65–99)
HCT VFR BLD CALC: 36.8 % (ref 34–48)
HDLC SERPL-MCNC: 75 MG/DL
HEMOGLOBIN: 12.4 G/DL (ref 11.7–16.1)
LDL CHOLESTEROL CALCULATED: 63 MG/DL
LDL/HDL RATIO: 0.8
LYMPHOCYTE %: 19.1 %
MCH RBC QN AUTO: 30.9 PG (ref 27–35)
MCHC RBC AUTO-ENTMCNC: 33.8 G/DL (ref 31–36)
MCV RBC AUTO: 91 FL (ref 81–101)
MONOCYTES # BLD: 7.8 %
NEUTROPHILS RELATIVE PERCENT: 65.6 %
PDW BLD-RTO: 14.5 % (ref 11.5–14.7)
PLATELETS: 160 X10E9/L (ref 150–450)
PMV BLD AUTO: 7.6 FL (ref 7–12)
POTASSIUM SERPL-SCNC: 3.7 MMOL/L (ref 3.5–5)
RBC: 4.03 X10E12/L (ref 3.3–5.5)
SODIUM BLD-SCNC: 141 MMOL/L (ref 134–146)
TOTAL PROTEIN: 7.4 G/DL (ref 6–8)
TRIGL SERPL-MCNC: 113 MG/DL (ref 27–150)
VLDLC SERPL CALC-MCNC: 23 MG/DL (ref 0–30)
WBC: 6.1 X10E9/L (ref 4–11.8)

## 2020-01-16 ENCOUNTER — OFFICE VISIT (OUTPATIENT)
Dept: FAMILY MEDICINE CLINIC | Age: 85
End: 2020-01-16

## 2020-01-16 VITALS
SYSTOLIC BLOOD PRESSURE: 112 MMHG | BODY MASS INDEX: 35.94 KG/M2 | OXYGEN SATURATION: 91 % | DIASTOLIC BLOOD PRESSURE: 64 MMHG | HEIGHT: 61 IN | HEART RATE: 88 BPM | WEIGHT: 190.38 LBS | RESPIRATION RATE: 16 BRPM

## 2020-01-16 LAB
CHP ED QC CHECK: ABNORMAL
GLUCOSE BLD-MCNC: 145 MG/DL
HBA1C MFR BLD: 6.1 %

## 2020-01-16 PROCEDURE — 82962 GLUCOSE BLOOD TEST: CPT | Performed by: EMERGENCY MEDICINE

## 2020-01-16 PROCEDURE — 99213 OFFICE O/P EST LOW 20 MIN: CPT | Performed by: EMERGENCY MEDICINE

## 2020-01-16 PROCEDURE — 83036 HEMOGLOBIN GLYCOSYLATED A1C: CPT | Performed by: EMERGENCY MEDICINE

## 2020-01-16 ASSESSMENT — ENCOUNTER SYMPTOMS
DIARRHEA: 0
SORE THROAT: 0
VOMITING: 0
SINUS PRESSURE: 0
COUGH: 0
VISUAL CHANGE: 0
VOICE CHANGE: 0
NAUSEA: 0
SHORTNESS OF BREATH: 0
TROUBLE SWALLOWING: 0
CHEST TIGHTNESS: 0
BACK PAIN: 0
ABDOMINAL PAIN: 0
RHINORRHEA: 0
CONSTIPATION: 0
WHEEZING: 0

## 2020-01-16 ASSESSMENT — PATIENT HEALTH QUESTIONNAIRE - PHQ9
SUM OF ALL RESPONSES TO PHQ9 QUESTIONS 1 & 2: 0
2. FEELING DOWN, DEPRESSED OR HOPELESS: 0
SUM OF ALL RESPONSES TO PHQ QUESTIONS 1-9: 0
SUM OF ALL RESPONSES TO PHQ QUESTIONS 1-9: 0
1. LITTLE INTEREST OR PLEASURE IN DOING THINGS: 0

## 2020-01-16 NOTE — PROGRESS NOTES
from Last 3 Encounters:   01/16/20 112/64   11/04/19 124/74   10/08/19 120/70     Wt Readings from Last 3 Encounters:   01/16/20 190 lb 6 oz (86.4 kg)   11/04/19 193 lb (87.5 kg)   10/08/19 191 lb 12.8 oz (87 kg)        Physical Exam  Vitals signs reviewed. Constitutional:       Appearance: She is well-developed. HENT:      Head: Normocephalic and atraumatic. Right Ear: External ear normal.      Left Ear: External ear normal.      Nose: Nose normal.   Eyes:      General: No scleral icterus. Conjunctiva/sclera: Conjunctivae normal.      Pupils: Pupils are equal, round, and reactive to light. Neck:      Musculoskeletal: Normal range of motion and neck supple. Thyroid: No thyromegaly. Vascular: No JVD. Cardiovascular:      Rate and Rhythm: Normal rate and regular rhythm. Heart sounds: No murmur. No friction rub. Pulmonary:      Effort: Pulmonary effort is normal.      Breath sounds: Examination of the right-lower field reveals rales. Examination of the left-lower field reveals rales. Rales present. No wheezing. Chest:      Chest wall: No tenderness. Abdominal:      General: Bowel sounds are normal.      Palpations: Abdomen is soft. There is no mass. Tenderness: There is no tenderness. Lymphadenopathy:      Cervical: No cervical adenopathy. Skin:     Findings: No rash. Neurological:      Mental Status: She is alert and oriented to person, place, and time. Psychiatric:         Behavior: Behavior is cooperative. Assessment:       Diagnosis Orders   1. Type 2 diabetes mellitus with other specified complication, unspecified whether long term insulin use (HCC)  POCT Glucose    POCT glycosylated hemoglobin (Hb A1C)   2. Bronchiectasis without complication ([facility] [address]     3. Pulmonary fibrosis (HCC)         Plan:      Medications Prescribed:  No orders of the defined types were placed in this encounter.       Orders Placed:  Orders Placed This Encounter   Procedures    POCT
Pt  jessa with girlfriend. Admits to smoking 1/4 ppd x 50yrs. Also admits to drinking 3 shots of whiskey everyday. Feels guilty about drinking and wants to quit.

## 2020-01-20 ENCOUNTER — OFFICE VISIT (OUTPATIENT)
Dept: PULMONOLOGY | Age: 85
End: 2020-01-20
Payer: MEDICARE

## 2020-01-20 VITALS
DIASTOLIC BLOOD PRESSURE: 62 MMHG | WEIGHT: 193.4 LBS | OXYGEN SATURATION: 92 % | HEART RATE: 92 BPM | SYSTOLIC BLOOD PRESSURE: 108 MMHG | HEIGHT: 61 IN | BODY MASS INDEX: 36.51 KG/M2 | TEMPERATURE: 99.8 F

## 2020-01-20 PROCEDURE — 99214 OFFICE O/P EST MOD 30 MIN: CPT | Performed by: NURSE PRACTITIONER

## 2020-01-20 ASSESSMENT — ENCOUNTER SYMPTOMS
WHEEZING: 0
TROUBLE SWALLOWING: 0
ABDOMINAL PAIN: 0
NAUSEA: 0
DIARRHEA: 0
VOMITING: 0
EYES NEGATIVE: 1
CHEST TIGHTNESS: 0
COUGH: 1
SHORTNESS OF BREATH: 0

## 2020-01-20 NOTE — PROGRESS NOTES
[facility]     Patient: Xiomy Baires, 80 y.o.   : 1935    Pt of Dr. Anastacia Mccabe   Patient presents with    Follow-up     Rheumatoid 6 month f/u Hrct 20        HPI  Alfredo Min is here for 6 month follow up for rheumatoid lung with HRCT. Following with Dr Triston Robbins ( rheumatology) on Methotrexate and Enbrel. Using her nebulizer with Duoneb three times a day, followed by use of her Acapella device TID, is able to bring up clear sputum with use of acapella, denies CP, SOB, fever, or myalgias. No ER visits or hospitalizations. Using O2 compliantly at 3LPM with rest/ sleep, 4LPM with activity.   SPO2 92% at rest today on POC at 3LPM     Past Medical hx   PMH:  Past Medical History:   Diagnosis Date    Acid reflux     Anxiety     Arrhythmia     Arthritis     Bronchiectasis ([facility] 2015    CAD (coronary artery disease)     NON-OBSTRUCTIVE    Cancer (HCC)     breast    Diabetes mellitus ([address]     Heart palpitations     HX OF:    Hyperlipidemia     Hypertension     ILD (interstitial lung disease) ([address] 2015    w/ Bronchectasis    Osteoarthritis     rhuematoid    Rheumatoid arthritis ([address]     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 ([facility] [address] 2015     SURGICAL HISTORY:  Past Surgical History:   Procedure Laterality Date    ABDOMINAL HERNIA REPAIR  2013    BREAST LUMPECTOMY  12/10/1998    CARDIOVASCULAR STRESS TEST  2010    EF 71%    CARDIOVASCULAR STRESS TEST  2008    EF 60%    CARDIOVASCULAR STRESS TEST  2005    CATARACT REMOVAL Bilateral     Dr Darwin Howe - 17 right, 7/10/17 left    CATARACT REMOVAL      CHOLECYSTECTOMY  2008    [address]     COLONOSCOPY      HAMMER TOE SURGERY Left 2018    [facility]    HIATAL [facility]ERN[facility] REPA[facility]  09/15/2009    [facility]    JOINT REPLACEMENT  02    left knee    TONSILLECTOMY AND Every other week      therapeutic multivitamin-minerals (THERAGRAN-M) tablet Take 1 tablet by mouth daily.  Calcium Citrate-Vitamin D 200-200 MG-UNIT TABS Take 1 tablet by mouth 2 times daily       Coenzyme Q10 (COQ10) 100 MG CAPS Take 100 mg by mouth daily.  aspirin 81 MG EC tablet Take 81 mg by mouth daily.  folic acid (FOLVITE) 1 MG tablet Take 1 mg by mouth daily. No current facility-administered medications for this visit. Nora CALERO   Review of Systems   Constitutional: Negative for chills and fever. HENT: Negative. Negative for trouble swallowing. Eyes: Negative. Respiratory: Positive for cough. Negative for chest tightness, shortness of breath and wheezing. Cardiovascular: Negative for chest pain, palpitations and leg swelling. Gastrointestinal: Negative for abdominal pain, diarrhea, nausea and vomiting. Genitourinary: Negative. Musculoskeletal: Positive for gait problem and joint swelling (unable to straighten fingers ). Skin: Negative. Hematological: Does not bruise/bleed easily. Psychiatric/Behavioral: Negative for sleep disturbance and suicidal ideas. Physical exam   /62 (Site: Left Upper Arm, Position: Sitting, Cuff Size: Medium Adult)   Pulse 92   Temp 99.8 °F (37.7 °C)   Ht 5' 1\" (1.549 m)   Wt 193 lb 6.4 oz (87.7 kg)   SpO2 92% Comment: 3L/02 at rest  BMI 36.54 kg/m²        Physical Exam  Vitals signs and nursing note reviewed. Constitutional:       General: She is not in acute distress. Appearance: She is well-developed. She is obese. HENT:      Mouth/Throat:      Pharynx: No oropharyngeal exudate. Eyes:      General: No scleral icterus. Right eye: No discharge. Conjunctiva/sclera: Conjunctivae normal.   Neck:      Musculoskeletal: Neck supple. Vascular: No JVD. Cardiovascular:      Rate and Rhythm: Normal rate and regular rhythm. Heart sounds: No murmur. No friction rub.    Pulmonary: Effort: Pulmonary effort is normal. No respiratory distress. Breath sounds: Normal breath sounds. No wheezing or rales. Chest:      Chest wall: No tenderness. Abdominal:      Palpations: Abdomen is soft. Musculoskeletal:         General: Deformity (deformity with contractures, swollen joints, and rheumatoid nodules bilateral hands ) present. No tenderness. Lymphadenopathy:      Cervical: No cervical adenopathy. Skin:     General: Skin is warm and dry. Capillary Refill: Capillary refill takes less than 2 seconds. Neurological:      Mental Status: She is alert and oriented to person, place, and time. Coordination: Coordination abnormal (use of walker with ambulation ). Psychiatric:         Behavior: Behavior normal.         Thought Content: Thought content normal.         Judgment: Judgment normal.          Test results   Lung Nodule Screening     [] Qualifies    [x]Does not qualify   [] Declined    [] Completed     FINDINGS:        There is mild bronchiectasis noted at the apices bilaterally. There is also bronchiectasis at the lung bases bilaterally, worse on the left.       There are peripheral irregular reticular opacities abutting the pleura bilaterally. There is pleural thickening demonstrated at the lung bases bilaterally as well as posterior laterally within the right midlung. No honeycombing is seen.       No pneumothorax is demonstrated.       There are postsurgical changes noted along the gastroesophageal region.       There is a small mediastinal lymph nodes anteriorly on the right on axial image 25 which is stable from the prior examination. There are additional calcified mediastinal lymph nodes noted which may be granulomatous in nature.        No acute osseous findings are seen.           Impression   1. There is bronchiectasis noted, most pronounced within the left lower lobe. There is also mild bronchiectasis within the right lower lobe as well as the lung apices.    2. Peripheral irregular reticular opacities abutting the pleura demonstrated along with mild pleural thickening at the lung bases bilaterally as well as at the posterior lateral right midlung. These may represent fibrotic changes. Recommend continued    follow-up.             **This report has been created using voice recognition software.  It may contain minor errors which are inherent in voice recognition technology. **       Final report electronically signed by Dr. Kyrie Mondragon on 1/13/2020 2:36 PM                    2020 2015        Assessment      Diagnosis Orders   1. Rheumatoid lung (HCC)  CT Chest High Resolution   2. Chronic respiratory failure, unspecified whether with hypoxia or hypercapnia (HCC)  CT Chest High Resolution   3. Bronchiectasis without complication ([facility] [address]  CT Chest High Resolution       reviewing the High res CT compared to CT chest in 2015 does not appear to be much difference although is little hard to tell as they are not the same type of CT  Plan   Do recommend continuing to follow with repeat HRCT chest in 1 year.    She is to call office for sooner appt if any worsening pulmonary symptoms  Continue Acapella TID,  Continue Duoneb TID  Continue supplemental O2 at 3LPM rest/ sleep and 4LPM activity   Continue recs from rheumatology for RA management     Will see Dhruv Mccarty in: 1 year    Electronically signed by PRISCILLA Salazar CNP on 1/20/2020 at 2:45 PM

## 2020-02-11 RX ORDER — LOSARTAN POTASSIUM 50 MG/1
50 TABLET ORAL DAILY
Qty: 90 TABLET | Refills: 1 | Status: SHIPPED | OUTPATIENT
Start: 2020-02-11 | End: 2020-05-12 | Stop reason: SDUPTHER

## 2020-02-11 RX ORDER — HYDROCHLOROTHIAZIDE 12.5 MG/1
12.5 CAPSULE, GELATIN COATED ORAL EVERY MORNING
Qty: 90 CAPSULE | Refills: 1 | Status: SHIPPED | OUTPATIENT
Start: 2020-02-11 | End: 2020-05-12 | Stop reason: SDUPTHER

## 2020-03-24 RX ORDER — IPRATROPIUM BROMIDE AND ALBUTEROL SULFATE 2.5; .5 MG/3ML; MG/3ML
SOLUTION RESPIRATORY (INHALATION)
Qty: 120 VIAL | Refills: 11 | Status: SHIPPED | OUTPATIENT
Start: 2020-03-24 | End: 2020-06-01

## 2020-04-23 ENCOUNTER — VIRTUAL VISIT (OUTPATIENT)
Dept: FAMILY MEDICINE CLINIC | Age: 85
End: 2020-04-23

## 2020-04-23 PROCEDURE — 99213 OFFICE O/P EST LOW 20 MIN: CPT | Performed by: EMERGENCY MEDICINE

## 2020-04-23 ASSESSMENT — ENCOUNTER SYMPTOMS
VOMITING: 0
RHINORRHEA: 0
CHEST TIGHTNESS: 0
DIARRHEA: 0
ABDOMINAL PAIN: 0
SINUS PRESSURE: 0
CONSTIPATION: 0
WHEEZING: 0
VOICE CHANGE: 0
NAUSEA: 0
SHORTNESS OF BREATH: 1
BACK PAIN: 0
COUGH: 0
TROUBLE SWALLOWING: 0
SORE THROAT: 0

## 2020-05-12 RX ORDER — HYDROCHLOROTHIAZIDE 12.5 MG/1
12.5 CAPSULE, GELATIN COATED ORAL EVERY MORNING
Qty: 90 CAPSULE | Refills: 1 | Status: SHIPPED | OUTPATIENT
Start: 2020-05-12 | End: 2020-07-30 | Stop reason: SDUPTHER

## 2020-05-12 RX ORDER — LOSARTAN POTASSIUM 50 MG/1
50 TABLET ORAL DAILY
Qty: 90 TABLET | Refills: 1 | Status: SHIPPED | OUTPATIENT
Start: 2020-05-12 | End: 2020-07-30 | Stop reason: SDUPTHER

## 2020-06-01 RX ORDER — IPRATROPIUM BROMIDE AND ALBUTEROL SULFATE 2.5; .5 MG/3ML; MG/3ML
SOLUTION RESPIRATORY (INHALATION)
Qty: 120 VIAL | Refills: 11 | Status: SHIPPED | OUTPATIENT
Start: 2020-06-01 | End: 2021-04-01

## 2020-06-15 LAB
LDL CHOLESTEROL DIRECT: NORMAL
POTASSIUM (K+): NORMAL

## 2020-07-30 ENCOUNTER — OFFICE VISIT (OUTPATIENT)
Dept: FAMILY MEDICINE CLINIC | Age: 85
End: 2020-07-30

## 2020-07-30 VITALS
DIASTOLIC BLOOD PRESSURE: 64 MMHG | HEIGHT: 62 IN | OXYGEN SATURATION: 92 % | BODY MASS INDEX: 35.56 KG/M2 | TEMPERATURE: 97 F | RESPIRATION RATE: 18 BRPM | WEIGHT: 193.25 LBS | HEART RATE: 88 BPM | SYSTOLIC BLOOD PRESSURE: 118 MMHG

## 2020-07-30 LAB
CHP ED QC CHECK: NORMAL
GLUCOSE BLD-MCNC: 6.2 MG/DL
HBA1C MFR BLD: 130 %

## 2020-07-30 PROCEDURE — 83036 HEMOGLOBIN GLYCOSYLATED A1C: CPT | Performed by: EMERGENCY MEDICINE

## 2020-07-30 PROCEDURE — G0438 PPPS, INITIAL VISIT: HCPCS | Performed by: EMERGENCY MEDICINE

## 2020-07-30 PROCEDURE — 82962 GLUCOSE BLOOD TEST: CPT | Performed by: EMERGENCY MEDICINE

## 2020-07-30 RX ORDER — HYDROCHLOROTHIAZIDE 12.5 MG/1
12.5 CAPSULE, GELATIN COATED ORAL EVERY MORNING
Qty: 90 CAPSULE | Refills: 1 | Status: SHIPPED | OUTPATIENT
Start: 2020-07-30 | End: 2021-01-29

## 2020-07-30 RX ORDER — LOSARTAN POTASSIUM 50 MG/1
50 TABLET ORAL DAILY
Qty: 90 TABLET | Refills: 1 | Status: SHIPPED | OUTPATIENT
Start: 2020-07-30 | End: 2021-01-29

## 2020-07-30 ASSESSMENT — LIFESTYLE VARIABLES
HOW OFTEN DO YOU HAVE SIX OR MORE DRINKS ON ONE OCCASION: 0
HOW OFTEN DURING THE LAST YEAR HAVE YOU FAILED TO DO WHAT WAS NORMALLY EXPECTED FROM YOU BECAUSE OF DRINKING: 0
HOW OFTEN DURING THE LAST YEAR HAVE YOU FOUND THAT YOU WERE NOT ABLE TO STOP DRINKING ONCE YOU HAD STARTED: 0
HOW OFTEN DURING THE LAST YEAR HAVE YOU NEEDED AN ALCOHOLIC DRINK FIRST THING IN THE MORNING TO GET YOURSELF GOING AFTER A NIGHT OF HEAVY DRINKING: 0
AUDIT-C TOTAL SCORE: 1
HAS A RELATIVE, FRIEND, DOCTOR, OR ANOTHER HEALTH PROFESSIONAL EXPRESSED CONCERN ABOUT YOUR DRINKING OR SUGGESTED YOU CUT DOWN: 0
HAVE YOU OR SOMEONE ELSE BEEN INJURED AS A RESULT OF YOUR DRINKING: 0
HOW MANY STANDARD DRINKS CONTAINING ALCOHOL DO YOU HAVE ON A TYPICAL DAY: 0
AUDIT TOTAL SCORE: 1
HOW OFTEN DURING THE LAST YEAR HAVE YOU BEEN UNABLE TO REMEMBER WHAT HAPPENED THE NIGHT BEFORE BECAUSE YOU HAD BEEN DRINKING: 0
HOW OFTEN DURING THE LAST YEAR HAVE YOU HAD A FEELING OF GUILT OR REMORSE AFTER DRINKING: 0
HOW OFTEN DO YOU HAVE A DRINK CONTAINING ALCOHOL: 1

## 2020-07-30 ASSESSMENT — ENCOUNTER SYMPTOMS
VISUAL CHANGE: 0
DIARRHEA: 0
WHEEZING: 0
RHINORRHEA: 0
SINUS PRESSURE: 0
BACK PAIN: 0
NAUSEA: 0
TROUBLE SWALLOWING: 0
COUGH: 0
VOMITING: 0
ABDOMINAL PAIN: 0
SHORTNESS OF BREATH: 0
CONSTIPATION: 0
CHEST TIGHTNESS: 0
SORE THROAT: 0
VOICE CHANGE: 0

## 2020-07-30 ASSESSMENT — PATIENT HEALTH QUESTIONNAIRE - PHQ9
SUM OF ALL RESPONSES TO PHQ QUESTIONS 1-9: 0
SUM OF ALL RESPONSES TO PHQ QUESTIONS 1-9: 0

## 2020-07-30 NOTE — PROGRESS NOTES
Medicare Annual Wellness Visit  Name: Norma Stanley Date: 2020   MRN: [id number] Sex: Female   Age: 80 y.o. Ethnicity: Non-/Non    : 1935 Race: Jcarlos Mckinney is here for [facility] [place]; Diabetes; and Medicare AWV    Screenings for behavioral, psychosocial and functional/safety risks, and cognitive dysfunction are all negative except as indicated below. These results, as well as other patient data from the [address] form, are documented in Flowsheets linked to this Encounter. Allergies   Allergen Reactions    Sulfa Antibiotics Nausea Only    Amoxicillin Itching and Rash         Prior to Visit Medications    Medication Sig Taking?  Authorizing Provider   losartan (COZAAR) 50 MG tablet Take 1 tablet by mouth daily Yes Jillian King MD   hydroCHLOROthiazide (MICROZIDE) 12.5 MG capsule Take 1 capsule by mouth every morning Yes Jillian King MD   ipratropium-albuterol (DUONEB) 0.5-2.5 (3) MG/3ML SOLN nebulizer solution USE 1 VIAL IN NEBULIZER EVERY 6 HOURS - for shortness of breath/wheezing Yes PRISCILLA Resendez - CNP   atorvastatin (LIPITOR) 40 MG tablet TAKE 1 TABLET EVERY EVENING Yes Edith Graf MD   metoprolol succinate (TOPROL XL) 50 MG extended release tablet TAKE ONE AND ONE-HALF TABLETS DAILY Yes Edith Graf MD   pantoprazole (PROTONIX) 40 MG tablet TAKE 1 TABLET DAILY Yes Jillian King MD   etanercept (ENBREL) 50 MG/ML injection Inject 50 mg into the skin once a week  Yes Historical Provider, MD   Probiotic Product (ALIGN PO) Take by mouth daily Yes Historical Provider, MD   Omega-3 Fatty Acids (FISH OIL PO) Take by mouth Yes Historical Provider, MD   OXYGEN 2 lpm at rest and to sleep and 4 lpm to ambulate  Patient taking differently: 3 lpm at rest and to sleep and 4 lpm to ambulate Yes Braden Howard MD   Methotrexate Sodium, PF, (RHEUMATREX) 1 GM/40ML SOLN chemo injection Inject 15.5 mg into the muscle every 14 days Every other week Yes Historical Provider, MD   therapeutic multivitamin-minerals (THERAGRAN-M) tablet Take 1 tablet by mouth daily. Yes Historical Provider, MD   Calcium Citrate-Vitamin D 200-200 MG-UNIT TABS Take 1 tablet by mouth 2 times daily  Yes Historical Provider, MD   Coenzyme Q10 (COQ10) 100 MG CAPS Take 100 mg by mouth daily. Yes Historical Provider, MD   aspirin 81 MG EC tablet Take 81 mg by mouth daily. Yes Historical Provider, MD   folic acid (FOLVITE) 1 MG tablet Take 1 mg by mouth daily.    Yes Historical Provider, MD   losartan-hydrochlorothiazide (HYZAAR) 50-12.5 MG per tablet TAKE 1 TABLET DAILY  Patient not taking: Reported on 4/23/2020  Niya Mazariegos MD         Past Medical History:   Diagnosis Date    Acid reflux     Anxiety     Arrhythmia     Arthritis     Bronchiectasis ([facility] [address] 09/30/2015    CAD (coronary artery disease)     NON-OBSTRUCTIVE    Cancer ([facility] [address]     breast    Diabetes mellitus ([facility] [address] [facility]     Heart palpitations     HX OF:    Hyperlipidemia     Hypertension     ILD (interstitial lung disease) ([facility] [address] 09/30/2015    w/ Bronchectasis    Osteoarthritis     rhuematoid    Rheumatoid arthritis ([facility] [address] [facility]     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 ([facility] [address] 09/30/2015       Past Surgical History:   Procedure Laterality Date    ABDOMINAL HERNIA REPAIR  9/2013    BREAST LUMPECTOMY  12/10/1998    CARDIOVASCULAR STRESS TEST  06/17/2010    EF 71%    CARDIOVASCULAR STRESS TEST  01/28/2008    EF 60%    CARDIOVASCULAR STRESS TEST  01/22/2005    CATARACT REMOVAL Bilateral     Dr Maria Del Carmen [facility] - 6/27/17 right, 7/10/17 left    CATARACT REMOVAL      CHOLECYSTECTOMY  09/20/2008    [facility]     COLONOSCOPY      HAMMER TOE SURGERY Left 09/2018    [address]ERNIA REPAIR  07/01/2010    [address]    HIATAL HERNIA REPAIR  09/15/2009    [facility]    JOINT REPLACEMENT  5/29/02    left knee    TONSILLECTOMY AND ADENOIDECTOMY  1945    TOTAL KNEE ARTHROPLASTY Left 2002    TRANSTHORACIC ECHOCARDIOGRAM  01/28/2005    EF 70%    UPPER GASTROINTESTINAL ENDOSCOPY  03/21/2017         Family History   Problem Relation Age of Onset    Heart Disease Mother     High Cholesterol Mother     Stroke Mother     Diabetes Mother     Heart Disease Father     High Blood Pressure Father     Pacemaker Father        CareTeam (Including outside providers/suppliers regularly involved in providing care):   Patient Care Team:  Mackenzie Ledesma MD as PCP - General (Emergency Medicine)  Mackenzie Ledesma MD as PCP - [facility] Empaneled Provider  Ashwini Mo MD as Consulting Physician (Pulmonary Disease)    Wt Readings from Last 3 Encounters:   07/30/20 193 lb 4 oz (87.7 kg)   01/20/20 193 lb 6.4 oz (87.7 kg)   01/16/20 190 lb 6 oz (86.4 kg)     Vitals:    07/30/20 1138   BP: 118/64   Site: Left Upper Arm   Position: Sitting   Cuff Size: Large Adult   Pulse: 88   Resp: 18   Temp: 97 °F (36.1 °C)   TempSrc: Skin   SpO2: 92%   Weight: 193 lb 4 oz (87.7 kg)   Height: 5' 2\" (1.575 m)     Body mass index is 35.35 kg/m². Based upon direct observation of the patient, evaluation of cognition reveals recent and remote memory intact. Patient's complete Health Risk Assessment and screening values have been reviewed and are found in Flowsheets. The following problems were reviewed today and where indicated follow up appointments were made and/or referrals ordered. Positive Risk Factor Screenings with Interventions:     Health Habits/Nutrition:  Health Habits/Nutrition  Do you exercise for at least 20 minutes 2-3 times per week?: Yes  Have you lost any weight without trying in the past 3 months?: No  Do you eat fewer than 2 meals per day?: No  Have you seen a dentist within the past year?: Yes  Body mass index is 35.35 kg/m².   Health Habits/Nutrition Interventions:  · Inadequate physical activity:  educational materials provided to promote increased physical activity  · Nutritional issues:  educational materials for healthy, well-balanced diet provided  · Dental exam overdue:  done seen dentist 6/28/20    Personalized Preventive Plan   Current Health Maintenance Status  Immunization History   Administered Date(s) Administered    Influenza A (K2C6-25) Vaccine PF IM 02/02/2010    Influenza Virus Vaccine 10/05/2013, 10/13/2014, 10/03/2015, 10/08/2018    Influenza, High Dose (Fluzone 65 yrs and older) 10/03/2017    Influenza, Quadv, IM, PF (6 mo and older Fluzone, Flulaval, Fluarix, and 3 yrs and older Afluria) 10/14/2016    Influenza, Triv, inactivated, subunit, adjuvanted, IM (Fluad 65 yrs and older) 10/08/2018, 10/07/2019    Influenza, Trivalent Vaccine 6-35 Months, IM, Preservative Free 10/14/2016    PPD Test 10/02/2015, 10/09/2015    Pneumococcal Conjugate 13-valent ([id number]) 11/01/2016    Pneumococcal Polysaccharide ([id number]) 10/03/2015    Tdap (Boostrix, Adacel) 11/01/2016    Zoster Live (Zostavax) 05/17/2010        Health Maintenance   Topic Date Due    Shingles Vaccine (2 of 3) 07/12/2010    Annual Wellness Visit (AWV)  05/29/2019    Flu vaccine (1) 09/01/2020    Colon cancer screen colonoscopy  10/21/2020    Lipid screen  06/15/2021    Potassium monitoring  06/15/2021    Creatinine monitoring  06/15/2021    DTaP/Tdap/Td vaccine (2 - Td) 11/01/2026    DEXA (modify frequency per FRAX score)  Completed    Pneumococcal 65+ years Vaccine  Completed    Hepatitis A vaccine  Aged Out    Hib vaccine  Aged Out    Meningococcal (ACWY) vaccine  Aged Out     Recommendations for [employer] Due: see orders and patient instructions/AVS.  . Recommended screening schedule for the next 5-10 years is provided to the patient in written form: see Patient Veda Zamora was seen today for check-up, diabetes and medicare awv.     Diagnoses and all orders for this visit:    Routine general medical examination at a health care facility    Type 2 diabetes mellitus with other specified complication, unspecified whether long term insulin use (HCC)  -     POCT glycosylated hemoglobin (Hb A1C)  -     POCT Glucose    Pulmonary fibrosis (HCC)    Essential hypertension    Other orders  -     losartan (COZAAR) 50 MG tablet; Take 1 tablet by mouth daily  -     hydroCHLOROthiazide (MICROZIDE) 12.5 MG capsule; Take 1 capsule by mouth every morning              Visit Date: 7/30/2020     Patrizia Mejia is a 80 y.o. female who presents today for:  Chief Complaint   Patient presents with    Check-Up    Diabetes    Medicare AWV         HPI:     doing well, No SOB, No chest pain , No fatigue. No nausea nor abdominal pain      Diabetes   She presents for her follow-up diabetic visit. She has type 2 diabetes mellitus. Her disease course has been stable. Pertinent negatives for hypoglycemia include no dizziness, headaches, nervousness/anxiousness or speech difficulty. Pertinent negatives for diabetes include no chest pain, no fatigue, no foot paresthesias, no visual change and no weakness. Symptoms are stable. An ACE inhibitor/angiotensin II receptor blocker is being taken. Hypertension   Pertinent negatives include no chest pain, headaches, neck pain, palpitations or shortness of breath. Hyperlipidemia   Pertinent negatives include no chest pain, myalgias or shortness of breath. Coronary Artery Disease   Pertinent negatives include no chest pain, chest tightness, dizziness, leg swelling, palpitations or shortness of breath. Risk factors include hyperlipidemia.          Current Medications:  Current Outpatient Medications   Medication Sig Dispense Refill    losartan (COZAAR) 50 MG tablet Take 1 tablet by mouth daily 90 tablet 1    hydroCHLOROthiazide (MICROZIDE) 12.5 MG capsule Take 1 capsule by mouth every morning 90 capsule 1    ipratropium-albuterol (DUONEB) 0.5-2.5 (3) MG/3ML SOLN nebulizer solution USE 1 VIAL IN NEBULIZER EVERY 6 HOURS - for shortness of breath/wheezing 120 vial 11    atorvastatin (LIPITOR) 40 MG tablet TAKE 1 TABLET EVERY EVENING 90 tablet 4    metoprolol succinate (TOPROL XL) 50 MG extended release tablet TAKE ONE AND ONE-HALF TABLETS DAILY 135 tablet 3    pantoprazole (PROTONIX) 40 MG tablet TAKE 1 TABLET DAILY 90 tablet 4    etanercept (ENBREL) 50 MG/ML injection Inject 50 mg into the skin once a week       Probiotic Product (ALIGN PO) Take by mouth daily      Omega-3 Fatty Acids (FISH OIL PO) Take by mouth      OXYGEN 2 lpm at rest and to sleep and 4 lpm to ambulate (Patient taking differently: 3 lpm at rest and to sleep and 4 lpm to ambulate) 1 Device 6    Methotrexate Sodium, PF, (RHEUMATREX) 1 GM/40ML SOLN chemo injection Inject 15.5 mg into the muscle every 14 days Every other week      therapeutic multivitamin-minerals (THERAGRAN-M) tablet Take 1 tablet by mouth daily.  Calcium Citrate-Vitamin D 200-200 MG-UNIT TABS Take 1 tablet by mouth 2 times daily       Coenzyme Q10 (COQ10) 100 MG CAPS Take 100 mg by mouth daily.  aspirin 81 MG EC tablet Take 81 mg by mouth daily.  folic acid (FOLVITE) 1 MG tablet Take 1 mg by mouth daily.  losartan-hydrochlorothiazide (HYZAAR) 50-12.5 MG per tablet TAKE 1 TABLET DAILY (Patient not taking: Reported on 4/23/2020) 30 tablet 0     No current facility-administered medications for this visit. Subjective:     Review of Systems   Constitutional: Negative for appetite change, chills, diaphoresis, fatigue and fever. HENT: Negative for congestion, ear pain, postnasal drip, rhinorrhea, sinus pressure, sneezing, sore throat, trouble swallowing and voice change. Respiratory: Negative for cough, chest tightness, shortness of breath and wheezing. Cardiovascular: Negative for chest pain, palpitations and leg swelling. Gastrointestinal: Negative for abdominal pain, constipation, diarrhea, nausea and vomiting. Musculoskeletal: Positive for arthralgias.  Negative for back pain, joint swelling, myalgias, neck pain and neck stiffness. Neurological: Negative for dizziness, syncope, speech difficulty, weakness, light-headedness, numbness and headaches. Psychiatric/Behavioral: The patient is not nervous/anxious. Objective:     /64 (Site: Left Upper Arm, Position: Sitting, Cuff Size: Large Adult)   Pulse 88   Temp 97 °F (36.1 °C) (Skin)   Resp 18   Ht 5' 2\" (1.575 m)   Wt 193 lb 4 oz (87.7 kg)   SpO2 92% Comment: on 3 liters per nasal canula  BMI 35.35 kg/m²   BP Readings from Last 3 Encounters:   07/30/20 118/64   01/20/20 108/62   01/16/20 112/64     Wt Readings from Last 3 Encounters:   07/30/20 193 lb 4 oz (87.7 kg)   01/20/20 193 lb 6.4 oz (87.7 kg)   01/16/20 190 lb 6 oz (86.4 kg)        Physical Exam  Vitals signs reviewed. Constitutional:       Appearance: She is well-developed. HENT:      Head: Normocephalic and atraumatic. Right Ear: External ear normal.      Left Ear: External ear normal.      Nose: Nose normal.   Eyes:      General: No scleral icterus. Conjunctiva/sclera: Conjunctivae normal.      Pupils: Pupils are equal, round, and reactive to light. Neck:      Musculoskeletal: Normal range of motion and neck supple. Thyroid: No thyromegaly. Vascular: No JVD. Cardiovascular:      Rate and Rhythm: Normal rate and regular rhythm. Heart sounds: No murmur. No friction rub. Pulmonary:      Effort: Pulmonary effort is normal.      Breath sounds: Examination of the right-middle field reveals rales. Examination of the left-middle field reveals rales. Examination of the right-lower field reveals rales. Examination of the left-lower field reveals rales. Rales present. No wheezing. Chest:      Chest wall: No tenderness. Abdominal:      General: Bowel sounds are normal.      Palpations: Abdomen is soft. There is no mass. Tenderness: There is no abdominal tenderness. Lymphadenopathy:      Cervical: No cervical adenopathy. Skin:     Findings: No rash. Neurological:      Mental Status: She is alert and oriented to person, place, and time. Psychiatric:         Behavior: Behavior is cooperative. Assessment:       Diagnosis Orders   1. Routine general medical examination at a health care facility     2. Type 2 diabetes mellitus with other specified complication, unspecified whether long term insulin use (HCC)  POCT glycosylated hemoglobin (Hb A1C)    POCT Glucose   3. Pulmonary fibrosis ([address]     4. Essential hypertension         Plan:      Medications Prescribed:  Orders Placed This Encounter   Medications    losartan (COZAAR) 50 MG tablet     Sig: Take 1 tablet by mouth daily     Dispense:  90 tablet     Refill:  1    hydroCHLOROthiazide (MICROZIDE) 12.5 MG capsule     Sig: Take 1 capsule by mouth every morning     Dispense:  90 capsule     Refill:  1       Orders Placed:  Orders Placed This Encounter   Procedures    POCT glycosylated hemoglobin (Hb A1C)    POCT Glucose     Lab Results   Component Value Date    LABA1C 130 (A) 07/30/2020    LABA1C 6.1 01/16/2020    LABA1C 6.5 07/09/2019     Lab Results   Component Value Date    LDLCALC 63 01/14/2020    CREATININE 0.88 01/14/2020       Results of Laboratory tests taken 6/15/20 were reviewed with the patient. Continue to monitor blood sugars 1  times a day. Return in 3 months (on 10/30/2020) for DM , Medicare Annual Wellness Visit in 1 year. Discussed use, benefit, and side effectsof prescribed medications. All patient questions answered. Pt voiced understanding. Instructed to continue current medications, diet and exercise. Patient agreedwith treatment plan.

## 2020-07-30 NOTE — PATIENT INSTRUCTIONS
Personalized Preventive Plan for Vicki Davis - 7/30/2020  Medicare offers a range of preventive health benefits. Some of the tests and screenings are paid in full while other may be subject to a deductible, co-insurance, and/or copay. Some of these benefits include a comprehensive review of your medical history including lifestyle, illnesses that may run in your family, and various assessments and screenings as appropriate. After reviewing your medical record and screening and assessments performed today your provider may have ordered immunizations, labs, imaging, and/or referrals for you. A list of these orders (if applicable) as well as your Preventive Care list are included within your After Visit Summary for your review. Other Preventive Recommendations:    · A preventive eye exam performed by an eye specialist is recommended every 1-2 years to screen for glaucoma; cataracts, macular degeneration, and other eye disorders. · A preventive dental visit is recommended every 6 months. · Try to get at least 150 minutes of exercise per week or 10,000 steps per day on a pedometer . · Order or download the FREE \"Exercise & Physical Activity: Your Everyday Guide\" from [employer]. Call 4-263.447.2692 or search [employer] online. · You need 9886-7932 mg of calcium and 8361-2037 IU of vitamin D per day. It is possible to meet your calcium requirement with diet alone, but a vitamin D supplement is usually necessary to meet this goal.  · When exposed to the sun, use a sunscreen that protects against both UVA and UVB radiation with an SPF of 30 or greater. Reapply every 2 to 3 hours or after sweating, drying off with a towel, or swimming. · Always wear a seat belt when traveling in a car. Always wear a helmet when riding a bicycle or motorcycle.

## 2020-11-03 ENCOUNTER — OFFICE VISIT (OUTPATIENT)
Dept: FAMILY MEDICINE CLINIC | Age: 85
End: 2020-11-03

## 2020-11-03 VITALS
WEIGHT: 193 LBS | BODY MASS INDEX: 35.51 KG/M2 | DIASTOLIC BLOOD PRESSURE: 78 MMHG | TEMPERATURE: 96.5 F | HEART RATE: 104 BPM | OXYGEN SATURATION: 91 % | SYSTOLIC BLOOD PRESSURE: 124 MMHG | HEIGHT: 62 IN | RESPIRATION RATE: 16 BRPM

## 2020-11-03 LAB
CHP ED QC CHECK: ABNORMAL
GLUCOSE BLD-MCNC: 171 MG/DL
HBA1C MFR BLD: 6.1 %

## 2020-11-03 PROCEDURE — 82962 GLUCOSE BLOOD TEST: CPT | Performed by: EMERGENCY MEDICINE

## 2020-11-03 PROCEDURE — 99213 OFFICE O/P EST LOW 20 MIN: CPT | Performed by: EMERGENCY MEDICINE

## 2020-11-03 PROCEDURE — 83036 HEMOGLOBIN GLYCOSYLATED A1C: CPT | Performed by: EMERGENCY MEDICINE

## 2020-11-03 ASSESSMENT — ENCOUNTER SYMPTOMS
SORE THROAT: 0
DIARRHEA: 0
VOICE CHANGE: 0
COUGH: 0
ABDOMINAL PAIN: 0
SHORTNESS OF BREATH: 0
NAUSEA: 0
WHEEZING: 0
TROUBLE SWALLOWING: 0
SINUS PRESSURE: 0
BACK PAIN: 1
VISUAL CHANGE: 0
CONSTIPATION: 0
VOMITING: 0
RHINORRHEA: 0
CHEST TIGHTNESS: 0

## 2020-11-03 NOTE — PROGRESS NOTES
Take by mouth      OXYGEN 2 lpm at rest and to sleep and 4 lpm to ambulate (Patient taking differently: 3 lpm at rest and to sleep and 4 lpm to ambulate) 1 Device 6    Methotrexate Sodium, PF, (RHEUMATREX) 1 GM/40ML SOLN chemo injection Inject 15.5 mg into the muscle every 14 days Every other week      therapeutic multivitamin-minerals (THERAGRAN-M) tablet Take 1 tablet by mouth daily.  Calcium Citrate-Vitamin D 200-200 MG-UNIT TABS Take 1 tablet by mouth 2 times daily       Coenzyme Q10 (COQ10) 100 MG CAPS Take 100 mg by mouth daily.  aspirin 81 MG EC tablet Take 81 mg by mouth daily.  folic acid (FOLVITE) 1 MG tablet Take 1 mg by mouth daily.  losartan-hydrochlorothiazide (HYZAAR) 50-12.5 MG per tablet TAKE 1 TABLET DAILY (Patient not taking: Reported on 4/23/2020) 30 tablet 0     No current facility-administered medications for this visit. Subjective:      Review of Systems   Constitutional: Negative for appetite change, chills, diaphoresis, fatigue and fever. HENT: Negative for congestion, ear pain, postnasal drip, rhinorrhea, sinus pressure, sneezing, sore throat, trouble swallowing and voice change. Respiratory: Negative for cough, chest tightness, shortness of breath and wheezing. Cardiovascular: Negative for chest pain, palpitations and leg swelling. Gastrointestinal: Negative for abdominal pain, constipation, diarrhea, nausea and vomiting. Musculoskeletal: Positive for back pain. Negative for arthralgias, joint swelling, myalgias, neck pain and neck stiffness. Neurological: Negative for dizziness, syncope, speech difficulty, weakness, light-headedness, numbness and headaches. Psychiatric/Behavioral: The patient is not nervous/anxious.         Objective:     /78   Pulse 104   Temp 96.5 °F (35.8 °C) (Skin)   Resp 16   Ht 5' 2\" (1.575 m)   Wt 193 lb (87.5 kg)   SpO2 91% Comment: 3 liters per nasal  cannula  BMI 35.30 kg/m²   BP Readings from Last 3 Encounters:   11/03/20 124/78   07/30/20 118/64   01/20/20 108/62     Wt Readings from Last 3 Encounters:   11/03/20 193 lb (87.5 kg)   07/30/20 193 lb 4 oz (87.7 kg)   01/20/20 193 lb 6.4 oz (87.7 kg)       Physical Exam  Vitals signs reviewed. Constitutional:       Appearance: She is well-developed. HENT:      Head: Normocephalic and atraumatic. Right Ear: External ear normal.      Left Ear: External ear normal.      Nose: Nose normal.   Eyes:      General: No scleral icterus. Conjunctiva/sclera: Conjunctivae normal.      Pupils: Pupils are equal, round, and reactive to light. Neck:      Musculoskeletal: Normal range of motion and neck supple. Thyroid: No thyromegaly. Vascular: No JVD. Cardiovascular:      Rate and Rhythm: Normal rate and regular rhythm. Heart sounds: No murmur. No friction rub. Pulmonary:      Effort: Pulmonary effort is normal.      Breath sounds: Rales present. No wheezing. Chest:      Chest wall: No tenderness. Abdominal:      General: Bowel sounds are normal.      Palpations: Abdomen is soft. There is no mass. Tenderness: There is no abdominal tenderness. Lymphadenopathy:      Cervical: No cervical adenopathy. Skin:     Findings: No rash. Neurological:      Mental Status: She is alert and oriented to person, place, and time. Psychiatric:         Behavior: Behavior is cooperative. Assessment:         Diagnosis Orders   1. Type 2 diabetes mellitus with other specified complication, unspecified whether long term insulin use (HCC)  POCT Glucose    POCT glycosylated hemoglobin (Hb A1C)    CBC Auto Differential    Comprehensive Metabolic Panel    Lipid Panel   2. Essential hypertension  Comprehensive Metabolic Panel    Lipid Panel   3. Hyperlipidemia, unspecified hyperlipidemia type     4. Chronic respiratory failure, unspecified whether with hypoxia or hypercapnia ([address]     5.  Chronic kidney disease, unspecified CKD stage Comprehensive Metabolic Panel       :      Medications Prescribed:  No orders of the defined types were placed in this encounter. Orders Placed:  Orders Placed This Encounter   Procedures    CBC Auto Differential     Laboratory Test to be done in 3 months     Standing Status:   Future     Standing Expiration Date:   11/3/2021    Comprehensive Metabolic Panel     Laboratory Test to be done in 3 months     Standing Status:   Future     Standing Expiration Date:   11/3/2021    Lipid Panel     Laboratory Test to be done in 3 months     Standing Status:   Future     Standing Expiration Date:   11/3/2021     Order Specific Question:   Is Patient Fasting?/# of Hours     Answer:   12    POCT Glucose    POCT glycosylated hemoglobin (Hb A1C)       Lab Results   Component Value Date    LABA1C 6.1 11/03/2020    LABA1C 130 (A) 07/30/2020    LABA1C 6.1 01/16/2020     Lab Results   Component Value Date    LDLCALC 63 01/14/2020    CREATININE 0.88 01/14/2020       Return in about 3 months (around 2/4/2021) for HTN DM. Discussed use, benefit, and side effects of prescribedmedications. All patient questions answered. Pt voiced understanding. Instructedto continue current medications, diet and exercise. Patient agreed with treatmentplan.

## 2020-11-04 ENCOUNTER — OFFICE VISIT (OUTPATIENT)
Dept: CARDIOLOGY CLINIC | Age: 85
End: 2020-11-04
Payer: MEDICARE

## 2020-11-04 VITALS
WEIGHT: 192.4 LBS | HEART RATE: 94 BPM | SYSTOLIC BLOOD PRESSURE: 128 MMHG | HEIGHT: 62 IN | BODY MASS INDEX: 35.41 KG/M2 | DIASTOLIC BLOOD PRESSURE: 70 MMHG

## 2020-11-04 PROBLEM — E66.01 MORBIDLY OBESE (HCC): Status: ACTIVE | Noted: 2020-11-04

## 2020-11-04 PROCEDURE — 99213 OFFICE O/P EST LOW 20 MIN: CPT | Performed by: NUCLEAR MEDICINE

## 2020-11-04 PROCEDURE — 93000 ELECTROCARDIOGRAM COMPLETE: CPT | Performed by: NUCLEAR MEDICINE

## 2020-11-04 RX ORDER — ATORVASTATIN CALCIUM 40 MG/1
TABLET, FILM COATED ORAL
Qty: 90 TABLET | Refills: 3 | Status: SHIPPED | OUTPATIENT
Start: 2020-11-04 | End: 2021-11-04 | Stop reason: SDUPTHER

## 2020-11-04 RX ORDER — METOPROLOL SUCCINATE 50 MG/1
TABLET, EXTENDED RELEASE ORAL
Qty: 135 TABLET | Refills: 3 | Status: SHIPPED | OUTPATIENT
Start: 2020-11-04 | End: 2021-11-04 | Stop reason: SDUPTHER

## 2020-11-04 NOTE — PROGRESS NOTES
1 year follow-up. She denies having any cardiac concerns. EKG completed. She states no changes in her medications.

## 2020-11-04 NOTE — PROGRESS NOTES
[address] [facility],[place] [address]  Dept: [phone]  Dept Fax: [phone]  Loc: [phone]    Visit Date: 11/4/2020    Kristie Malcolm is a 80 y.o. female who presents todayfor:  Chief Complaint   Patient presents with    1 Year Follow Up    Coronary Artery Disease    Hypertension   known pulmonary fibrosis  Followed by pulmonary   On home O2  No chest pain   No changes in breathing  Stable dyspnea  No changes from last year   BP is stable      HPI:  HPI  Past Medical History:   Diagnosis Date    Acid reflux     Anxiety     Arrhythmia     Arthritis     Bronchiectasis ([facility] [address] 09/30/2015    CAD (coronary artery disease)     NON-OBSTRUCTIVE    Cancer ([facility] [address]     breast    Diabetes mellitus ([facility] [address]     Heart palpitations     HX OF:    Hyperlipidemia     Hypertension     ILD (interstitial lung disease) ([address] 09/30/2015    w/ Bronchectasis    Osteoarthritis     rhuematoid    Rheumatoid arthritis ([facility] [address]     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 ([facility] [address] 09/30/2015      Past Surgical History:   Procedure Laterality Date    ABDOMINAL HERNIA REPAIR  9/2013    BREAST LUMPECTOMY  12/10/1998    CARDIOVASCULAR STRESS TEST  06/17/2010    EF 71%    CARDIOVASCULAR STRESS TEST  01/28/2008    EF 60%    CARDIOVASCULAR STRESS TEST  01/22/2005    CATARACT REMOVAL Bilateral     Dr Salvador Craig - 6/27/17 right, 7/10/17 left    CATARACT REMOVAL      CHOLECYSTECTOMY  09/20/2008    [facility]     COLONOSCOPY      HAMMER TOE SURGERY Left 09/2018    [facility]ERN[facility]2010    [facility]    HIATAL [facility]ERNIA REPAIR  09/15/2009    [facility]    JOINT REPLACEMENT  5/29/02    left knee    TONSILLECTOMY AND ADENOIDECTOMY  1945    TOTAL KNEE ARTHROPLASTY Left 2002    TRANSTHORACIC ECHOCARDIOGRAM  01/28/2005    EF 70%    UPPER GASTROINTESTINAL ENDOSCOPY  03/21/2017     Family History   Problem Relation Age of Onset    Heart Disease Mother     High Cholesterol Mother     Stroke Mother     Diabetes Mother     Heart Disease Father     High Blood Pressure Father     Pacemaker Father      Social History     Tobacco Use    Smoking status: Never Smoker    Smokeless tobacco: Never Used   Substance Use Topics    Alcohol use: Yes     Alcohol/week: 0.0 standard drinks     Comment: RARE      Current Outpatient Medications   Medication Sig Dispense Refill    losartan (COZAAR) 50 MG tablet Take 1 tablet by mouth daily 90 tablet 1    hydroCHLOROthiazide (MICROZIDE) 12.5 MG capsule Take 1 capsule by mouth every morning 90 capsule 1    ipratropium-albuterol (DUONEB) 0.5-2.5 (3) MG/3ML SOLN nebulizer solution USE 1 VIAL IN NEBULIZER EVERY 6 HOURS - for shortness of breath/wheezing 120 vial 11    losartan-hydrochlorothiazide (HYZAAR) 50-12.5 MG per tablet TAKE 1 TABLET DAILY 30 tablet 0    atorvastatin (LIPITOR) 40 MG tablet TAKE 1 TABLET EVERY EVENING 90 tablet 4    metoprolol succinate (TOPROL XL) 50 MG extended release tablet TAKE ONE AND ONE-HALF TABLETS DAILY 135 tablet 3    pantoprazole (PROTONIX) 40 MG tablet TAKE 1 TABLET DAILY 90 tablet 4    etanercept (ENBREL) 50 MG/ML injection Inject 50 mg into the skin once a week       Probiotic Product (ALIGN PO) Take by mouth daily      Omega-3 Fatty Acids (FISH OIL PO) Take by mouth      OXYGEN 2 lpm at rest and to sleep and 4 lpm to ambulate (Patient taking differently: 3 lpm at rest and to sleep and 4 lpm to ambulate) 1 Device 6    Methotrexate Sodium, PF, (RHEUMATREX) 1 GM/40ML SOLN chemo injection Inject 15.5 mg into the muscle every 14 days Every other week      therapeutic multivitamin-minerals (THERAGRAN-M) tablet Take 1 tablet by mouth daily.  Calcium Citrate-Vitamin D 200-200 MG-UNIT TABS Take 1 tablet by mouth 2 times daily       Coenzyme Q10 (COQ10) 100 MG CAPS Take 100 mg by mouth daily.  aspirin 81 MG EC tablet Take 81 mg by mouth daily.         folic acid (FOLVITE) 1 MG tablet Take 1 mg by mouth daily. No current facility-administered medications for this visit. Allergies   Allergen Reactions    Sulfa Antibiotics Nausea Only    Amoxicillin Itching and Rash     Health Maintenance   Topic Date Due    Colon cancer screen colonoscopy  10/21/2020    Shingles Vaccine (3 of 3) 11/13/2020    Lipid screen  06/15/2021    Potassium monitoring  06/15/2021    Annual Wellness Visit (AWV)  07/31/2021    Creatinine monitoring  09/23/2021    DTaP/Tdap/Td vaccine (2 - Td) 11/01/2026    DEXA (modify frequency per FRAX score)  Completed    Flu vaccine  Completed    Pneumococcal 65+ years Vaccine  Completed    Hepatitis A vaccine  Aged Out    Hib vaccine  Aged Out    Meningococcal (ACWY) vaccine  Aged Out       Subjective:  Review of Systems  General:   No fever, no chills, No fatigue or weight loss  Pulmonary:    some dyspnea, no wheezing  Cardiac:    Denies recent chest pain,   GI:     No nausea or vomiting, no abdominal pain  Neuro:    No dizziness or light headedness,   Musculoskeletal:  No recent active issues  Extremities:   No edema, no obvious claudication       Objective:  Physical Exam  /70   Pulse 94   Ht 5' 2\" (1.575 m)   Wt 192 lb 6.4 oz (87.3 kg)   BMI 35.19 kg/m²   General:   Well developed, well nourished  Lungs:   Clear to auscultation  Heart:    Normal S1 S2, Slight murmur. no rubs, no gallops  Abdomen:   Soft, non tender, no organomegalies, positive bowel sounds  Extremities:   No edema, no cyanosis, good peripheral pulses  Neurological:   Awake, alert, oriented. No obvious focal deficits  Musculoskelatal:  No obvious deformities    Assessment:      Diagnosis Orders   1. Coronary artery disease involving native coronary artery without angina pectoris, unspecified whether native or transplanted heart  EKG 12 lead   2. Pulmonary fibrosis ([facility] [address]     as above  Cardiac fair for now   ECG in office was done today. I reviewed the ECG.  No

## 2020-12-16 RX ORDER — PANTOPRAZOLE SODIUM 40 MG/1
TABLET, DELAYED RELEASE ORAL
Qty: 90 TABLET | Refills: 3 | Status: SHIPPED | OUTPATIENT
Start: 2020-12-16 | End: 2021-12-13

## 2020-12-16 NOTE — TELEPHONE ENCOUNTER
Date of last visit:  11/3/2020  Date of next visit:  2/9/2021    Requested Prescriptions     Pending Prescriptions Disp Refills    pantoprazole (PROTONIX) 40 MG tablet [Pharmacy Med Name: PANTOPRAZOLE SODIUM DR TABS 40MG] 90 tablet 3     Sig: TAKE 1 TABLET DAILY

## 2021-01-11 ENCOUNTER — TELEPHONE (OUTPATIENT)
Dept: FAMILY MEDICINE CLINIC | Age: 86
End: 2021-01-11

## 2021-01-11 ENCOUNTER — HOSPITAL ENCOUNTER (OUTPATIENT)
Dept: CT IMAGING | Age: 86
Discharge: HOME | End: 2021-01-11
Payer: MEDICARE

## 2021-01-11 DIAGNOSIS — J47.9 BRONCHIECTASIS WITHOUT COMPLICATION (HCC): ICD-10-CM

## 2021-01-11 DIAGNOSIS — J96.10 CHRONIC RESPIRATORY FAILURE, UNSPECIFIED WHETHER WITH HYPOXIA OR HYPERCAPNIA (HCC): ICD-10-CM

## 2021-01-11 DIAGNOSIS — M05.10 RHEUMATOID LUNG (HCC): ICD-10-CM

## 2021-01-11 PROCEDURE — 71250 CT THORAX DX C-: CPT

## 2021-01-11 NOTE — TELEPHONE ENCOUNTER
Pt would like to know if she would be able to get the vaccine since she has an allergy to Amoxicillin. Please call pt to advise.

## 2021-01-29 RX ORDER — HYDROCHLOROTHIAZIDE 12.5 MG/1
12.5 CAPSULE, GELATIN COATED ORAL EVERY MORNING
Qty: 90 CAPSULE | Refills: 1 | Status: SHIPPED | OUTPATIENT
Start: 2021-01-29 | End: 2021-07-26 | Stop reason: SDUPTHER

## 2021-01-29 RX ORDER — LOSARTAN POTASSIUM 50 MG/1
TABLET ORAL
Qty: 90 TABLET | Refills: 1 | Status: SHIPPED | OUTPATIENT
Start: 2021-01-29 | End: 2021-07-26 | Stop reason: SDUPTHER

## 2021-01-29 NOTE — TELEPHONE ENCOUNTER
Date of last visit:  11/3/2020  Date of next visit:  2/11/2021    Requested Prescriptions     Pending Prescriptions Disp Refills    losartan (COZAAR) 50 MG tablet [Pharmacy Med Name: LOSARTAN POTASSIUM 50 MG TAB] 90 tablet 1     Sig: take 1 tablet by mouth once daily    hydroCHLOROthiazide (MICROZIDE) 12.5 MG capsule [Pharmacy Med Name: HYDROCHLOROTHIAZIDE 12.5 MG CP] 90 capsule 1     Sig: take 1 capsule by mouth every morning

## 2021-02-05 LAB
ABSOLUTE BASO #: 0.1 X10E9/L (ref 0–0.2)
ABSOLUTE EOS #: 0.4 X10E9/L (ref 0–0.4)
ABSOLUTE LYMPH #: 1.3 X10E9/L (ref 1–3.5)
ABSOLUTE MONO #: 0.4 X10E9/L (ref 0–0.9)
ABSOLUTE NEUT #: 4.2 X10E9/L (ref 1.5–6.6)
ALBUMIN SERPL-MCNC: 4.1 G/DL (ref 3.2–5.3)
ALK PHOSPHATASE: 64 U/L (ref 39–130)
ALT SERPL-CCNC: 17 U/L (ref 0–31)
ANION GAP SERPL CALCULATED.3IONS-SCNC: 8 MMOL/L (ref 5–15)
AST SERPL-CCNC: 25 U/L (ref 0–41)
BASOPHILS RELATIVE PERCENT: 1.1 %
BILIRUB SERPL-MCNC: 0.7 MG/DL (ref 0.3–1.2)
BUN BLDV-MCNC: 22 MG/DL (ref 5–27)
CALCIUM SERPL-MCNC: 10.2 MG/DL (ref 8.5–10.5)
CHLORIDE BLD-SCNC: 97 MMOL/L (ref 98–109)
CHOLESTEROL/HDL RATIO: 2.2 (ref 1–5)
CHOLESTEROL: 154 MG/DL (ref 150–200)
CO2: 35 MMOL/L (ref 22–32)
CREAT SERPL-MCNC: 1.13 MG/DL (ref 0.4–1)
EGFR AFRICAN AMERICAN: 55 ML/MIN/1.73SQ.M
EGFR IF NONAFRICAN AMERICAN: 46 ML/MIN/1.73SQ.M
EOSINOPHILS RELATIVE PERCENT: 6.1 %
GLUCOSE: 146 MG/DL (ref 65–99)
HCT VFR BLD CALC: 35.8 % (ref 35–47)
HDLC SERPL-MCNC: 71 MG/DL
HEMOGLOBIN: 11.8 G/DL (ref 11.7–15.5)
LDL CHOLESTEROL CALCULATED: 59 MG/DL
LDL/HDL RATIO: 0.8
LYMPHOCYTE %: 20.1 %
MCH RBC QN AUTO: 31.3 PG (ref 27–34)
MCHC RBC AUTO-ENTMCNC: 33 G/DL (ref 32–36)
MCV RBC AUTO: 95 FL (ref 80–100)
MONOCYTES # BLD: 6.5 %
NEUTROPHILS RELATIVE PERCENT: 66.2 %
PDW BLD-RTO: 15.1 % (ref 11.5–15)
PLATELETS: 154 X10E9/L (ref 150–450)
PMV BLD AUTO: 8.1 FL (ref 7–12)
POTASSIUM SERPL-SCNC: 4 MMOL/L (ref 3.5–5)
RBC: 3.78 X10E12/L (ref 3.8–5.2)
SODIUM BLD-SCNC: 140 MMOL/L (ref 134–146)
TOTAL PROTEIN: 7.6 G/DL (ref 6–8)
TRIGL SERPL-MCNC: 118 MG/DL (ref 27–150)
VLDLC SERPL CALC-MCNC: 24 MG/DL (ref 0–30)
WBC: 6.3 X10E9/L (ref 4–11)

## 2021-02-11 ENCOUNTER — VIRTUAL VISIT (OUTPATIENT)
Dept: FAMILY MEDICINE CLINIC | Age: 86
End: 2021-02-11

## 2021-02-11 DIAGNOSIS — E78.5 HYPERLIPIDEMIA, UNSPECIFIED HYPERLIPIDEMIA TYPE: ICD-10-CM

## 2021-02-11 DIAGNOSIS — E11.69 TYPE 2 DIABETES MELLITUS WITH OTHER SPECIFIED COMPLICATION, UNSPECIFIED WHETHER LONG TERM INSULIN USE (HCC): ICD-10-CM

## 2021-02-11 DIAGNOSIS — I10 ESSENTIAL HYPERTENSION: ICD-10-CM

## 2021-02-11 DIAGNOSIS — R19.7 DIARRHEA, UNSPECIFIED TYPE: Primary | ICD-10-CM

## 2021-02-11 DIAGNOSIS — J96.10 CHRONIC RESPIRATORY FAILURE, UNSPECIFIED WHETHER WITH HYPOXIA OR HYPERCAPNIA (HCC): ICD-10-CM

## 2021-02-11 PROCEDURE — 99213 OFFICE O/P EST LOW 20 MIN: CPT | Performed by: EMERGENCY MEDICINE

## 2021-02-11 SDOH — ECONOMIC STABILITY: FOOD INSECURITY: WITHIN THE PAST 12 MONTHS, YOU WORRIED THAT YOUR FOOD WOULD RUN OUT BEFORE YOU GOT MONEY TO BUY MORE.: NEVER TRUE

## 2021-02-11 SDOH — ECONOMIC STABILITY: TRANSPORTATION INSECURITY
IN THE PAST 12 MONTHS, HAS THE LACK OF TRANSPORTATION KEPT YOU FROM MEDICAL APPOINTMENTS OR FROM GETTING MEDICATIONS?: NOT ASKED

## 2021-02-11 ASSESSMENT — ENCOUNTER SYMPTOMS
TROUBLE SWALLOWING: 0
COUGH: 0
VOMITING: 0
CHEST TIGHTNESS: 0
RHINORRHEA: 0
CONSTIPATION: 0
WHEEZING: 0
VOICE CHANGE: 0
BACK PAIN: 0
SORE THROAT: 0
ABDOMINAL PAIN: 0
NAUSEA: 0
DIARRHEA: 1
SINUS PRESSURE: 0
SHORTNESS OF BREATH: 0

## 2021-02-11 ASSESSMENT — PATIENT HEALTH QUESTIONNAIRE - PHQ9
SUM OF ALL RESPONSES TO PHQ QUESTIONS 1-9: 0
SUM OF ALL RESPONSES TO PHQ QUESTIONS 1-9: 0
SUM OF ALL RESPONSES TO PHQ9 QUESTIONS 1 & 2: 0
SUM OF ALL RESPONSES TO PHQ QUESTIONS 1-9: 0

## 2021-02-11 NOTE — PROGRESS NOTES
Tess Leyva agreed to Video Chat in presence of Dr Thalia Ross and myself. Verified who was present in room with Mari. Tess Leyva informed the e-mail address used to Face Time cannot be used to contact the Provider, if they are any questions or concerns they need to call the office directly. Tess Leyva stated understanding. [facility] [address] .

## 2021-02-11 NOTE — PROGRESS NOTES
Telemedicine visit :  Date: 2/11/2021     Patient verified Video Chat was not encrypted, and agrees to the Video Exam in presence of nurse. Manasa Cervantes is a 80 y. o.female who presents today  Chief Complaint   Patient presents with    Check-Up    Hypertension    Diarrhea     3 X'S  in the last month         HPI:     HPI    Patient was seen via telemedicine, face time with his/her phone. Tuesday had diarrhea x 2 , took imodium and gotten better. Had again last January    Had constipation usually    Patient had a Covid vaccine x 1 already    . CurrentHome Medications:  Current Outpatient Medications   Medication Sig Dispense Refill    losartan (COZAAR) 50 MG tablet take 1 tablet by mouth once daily 90 tablet 1    hydroCHLOROthiazide (MICROZIDE) 12.5 MG capsule take 1 capsule by mouth every morning 90 capsule 1    pantoprazole (PROTONIX) 40 MG tablet TAKE 1 TABLET DAILY 90 tablet 3    atorvastatin (LIPITOR) 40 MG tablet TAKE 1 TABLET EVERY EVENING 90 tablet 3    metoprolol succinate (TOPROL XL) 50 MG extended release tablet TAKE ONE AND ONE-HALF TABLETS DAILY 135 tablet 3    ipratropium-albuterol (DUONEB) 0.5-2.5 (3) MG/3ML SOLN nebulizer solution USE 1 VIAL IN NEBULIZER EVERY 6 HOURS - for shortness of breath/wheezing 120 vial 11    etanercept (ENBREL) 50 MG/ML injection Inject 50 mg into the skin once a week       Probiotic Product (ALIGN PO) Take by mouth daily      Omega-3 Fatty Acids (FISH OIL PO) Take by mouth      OXYGEN 2 lpm at rest and to sleep and 4 lpm to ambulate (Patient taking differently: 3 lpm at rest and to sleep and 4 lpm to ambulate) 1 Device 6    Methotrexate Sodium, PF, (RHEUMATREX) 1 GM/40ML SOLN chemo injection Inject 15.5 mg into the muscle every 14 days Every other week      therapeutic multivitamin-minerals (THERAGRAN-M) tablet Take 1 tablet by mouth daily.         Calcium Citrate-Vitamin D 200-200 MG-UNIT TABS Take 1 tablet by mouth 2 times daily       Coenzyme Q10 (COQ10) 100 MG CAPS Take 100 mg by mouth daily.  aspirin 81 MG EC tablet Take 81 mg by mouth daily.  folic acid (FOLVITE) 1 MG tablet Take 1 mg by mouth daily. No current facility-administered medications for this visit. Subjective:      Review of Systems   Constitutional: Negative for appetite change, chills, diaphoresis, fatigue and fever. HENT: Negative for congestion, ear pain, postnasal drip, rhinorrhea, sinus pressure, sneezing, sore throat, trouble swallowing and voice change. Respiratory: Negative for cough, chest tightness, shortness of breath and wheezing. Cardiovascular: Negative for chest pain, palpitations and leg swelling. Gastrointestinal: Positive for diarrhea. Negative for abdominal pain, constipation, nausea and vomiting. Musculoskeletal: Positive for arthralgias. Negative for back pain, joint swelling, myalgias, neck pain and neck stiffness. Neurological: Negative for dizziness, syncope, weakness, light-headedness, numbness and headaches. Objective: There were no vitals taken for this visit. BP Readings from Last 3 Encounters:   11/04/20 128/70   11/03/20 124/78   07/30/20 118/64     Wt Readings from Last 3 Encounters:   11/04/20 192 lb 6.4 oz (87.3 kg)   11/03/20 193 lb (87.5 kg)   07/30/20 193 lb 4 oz (87.7 kg)       Physical Exam    Physical Examination:  not done, this is a telemedicine  Patient is looking alert, active, cooperative , no difficulty of breathing    Assessment:         Diagnosis Orders   1. Diarrhea, unspecified type     2. Essential hypertension     3. Hyperlipidemia, unspecified hyperlipidemia type     4. Chronic respiratory failure, unspecified whether with hypoxia or hypercapnia ([facility] [address]     5. Type 2 diabetes mellitus with other specified complication, unspecified whether long term insulin use (HCC)         :      Medications Prescribed:  No orders of the defined types were placed in this encounter.     Orders Placed:  No orders of the defined types were placed in this encounter. Telemedicine time : 15 minutes    Discussed about COVID 19 , viral upper respiratory infection, signs and symptoms, talk about they are high risks, hygiene, washing hands, use of mask, covering mouth when coughing, avoid traveling especially airplane, cruise, conference, seminars, attending masses , avoid exposure to children and Millennials. Encouraged the patient to call the office is having more concerned. Advised to hydrate himself well, eat balance diet, over-the-counter vitamin C vitamin D, zinc and melatonin over-the-counter    Results of Laboratory tests taken 2/4/21  were reviewed with the patient. Results were w/in  acceptable range    Patient's diarrhea is likely secondary to the salad or from irritable bowel syndrome. Advised the patient to take MiraLAX over-the-counter for constipation. Call or Return in about 3 months (around 5/11/2021) for HTN . Discussed use, benefit, and side effects of prescribedmedications. All patient questions answered. Pt voiced understanding. Instructedto continue current medications, diet and exercise. Patient agreed with treatmentplan.

## 2021-03-02 ENCOUNTER — OFFICE VISIT (OUTPATIENT)
Dept: PULMONOLOGY | Age: 86
End: 2021-03-02
Payer: MEDICARE

## 2021-03-02 VITALS
TEMPERATURE: 98 F | HEIGHT: 62 IN | BODY MASS INDEX: 33.82 KG/M2 | DIASTOLIC BLOOD PRESSURE: 62 MMHG | HEART RATE: 80 BPM | WEIGHT: 183.8 LBS | OXYGEN SATURATION: 92 % | SYSTOLIC BLOOD PRESSURE: 118 MMHG

## 2021-03-02 DIAGNOSIS — J47.9 BRONCHIECTASIS WITHOUT COMPLICATION (HCC): ICD-10-CM

## 2021-03-02 DIAGNOSIS — M05.10 RHEUMATOID LUNG (HCC): Primary | ICD-10-CM

## 2021-03-02 DIAGNOSIS — J96.11 CHRONIC RESPIRATORY FAILURE WITH HYPOXIA (HCC): ICD-10-CM

## 2021-03-02 LAB
EXPIRATORY TIME: NORMAL
FEF 25-75% %PRED-PRE: NORMAL
FEF 25-75% PRED: NORMAL
FEF 25-75%-PRE: NORMAL
FEV1 %PRED-PRE: 61 %
FEV1 PRED: NORMAL
FEV1/FVC %PRED-PRE: NORMAL
FEV1/FVC PRED: NORMAL
FEV1/FVC: 112 %
FEV1: NORMAL
FVC %PRED-PRE: NORMAL
FVC PRED: NORMAL
FVC: NORMAL
PEF %PRED-PRE: NORMAL
PEF PRED: NORMAL
PEF-PRE: NORMAL

## 2021-03-02 PROCEDURE — 99214 OFFICE O/P EST MOD 30 MIN: CPT | Performed by: NURSE PRACTITIONER

## 2021-03-02 PROCEDURE — 94010 BREATHING CAPACITY TEST: CPT | Performed by: NURSE PRACTITIONER

## 2021-03-02 ASSESSMENT — ENCOUNTER SYMPTOMS
CHEST TIGHTNESS: 0
EYES NEGATIVE: 1
ABDOMINAL PAIN: 0
VOMITING: 0
WHEEZING: 0
DIARRHEA: 0
COUGH: 1
NAUSEA: 0
SHORTNESS OF BREATH: 0

## 2021-03-02 ASSESSMENT — PULMONARY FUNCTION TESTS
FEV1/FVC: 112
FEV1_PERCENT_PREDICTED_PRE: 61

## 2021-03-02 NOTE — PROGRESS NOTES
[facility]     Patient: Kal Quinonez, 80 y.o.   : 1935  3/2/2021    Pt of Dr. 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   On 3-4LPM O2 , unchanged  No ER visits/ Hospitalizations/ no new medical issues     Past Medical hx   PMH:  Past Medical History:   Diagnosis Date    Acid reflux     Anxiety     Arrhythmia     Arthritis     Bronchiectasis ([address] 2015    CAD (coronary artery disease)     NON-OBSTRUCTIVE    Cancer (HCC)     breast    Diabetes mellitus ([address]     Heart palpitations     HX OF:    Hyperlipidemia     Hypertension     ILD (interstitial lung disease) ([address] 2015    w/ Bronchectasis    Osteoarthritis     rhuematoid    Rheumatoid arthritis ([address]     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 ([address] 2015     SURGICAL HISTORY:  Past Surgical History:   Procedure Laterality Date    ABDOMINAL HERNIA REPAIR  2013    BREAST LUMPECTOMY  12/10/1998    CARDIOVASCULAR STRESS TEST  2010    EF 71%    CARDIOVASCULAR STRESS TEST  2008    EF 60%    CARDIOVASCULAR STRESS TEST  2005    CATARACT REMOVAL Bilateral     Dr Laurie Downs - 17 right, 7/10/17 left    CATARACT REMOVAL      [facility]LE[facility]Y  2008    [facility]     COLONOSCOPY      HAMMER TOE SURGERY Left 2018    [facility]    HIATAL [facility]ERNIA REPAIR  09/15/2009    [facility]    JOINT REPLACEMENT  02    left knee    TONSILLECTOMY AND ADENOIDECTOMY      TOTAL KNEE ARTHROPLASTY Left 2002    TRANSTHORACIC ECHOCARDIOGRAM  01/28/2005    EF 70%    UPPER GASTROINTESTINAL ENDOSCOPY  03/21/2017     SOCIAL HISTORY:  Social History     Tobacco Use    Smoking status: Never Smoker    Smokeless tobacco: Never Used   Substance Use Topics    Alcohol use:  Yes     Alcohol/week: 0.0 standard drinks     Comment: RARE    Drug use: No     ALLERGIES:  Allergies   Allergen Reactions    Sulfa Antibiotics Nausea Only    Amoxicillin Itching and Rash     FAMILY HISTORY:  Family History   Problem Relation Age of Onset    Heart Disease Mother     High Cholesterol Mother     Stroke Mother     Diabetes Mother     Heart Disease Father     High Blood Pressure Father     Pacemaker Father      CURRENT MEDICATIONS:  Current Outpatient Medications   Medication Sig Dispense Refill    losartan (COZAAR) 50 MG tablet take 1 tablet by mouth once daily 90 tablet 1    hydroCHLOROthiazide (MICROZIDE) 12.5 MG capsule take 1 capsule by mouth every morning 90 capsule 1    pantoprazole (PROTONIX) 40 MG tablet TAKE 1 TABLET DAILY 90 tablet 3    atorvastatin (LIPITOR) 40 MG tablet TAKE 1 TABLET EVERY EVENING 90 tablet 3    metoprolol succinate (TOPROL XL) 50 MG extended release tablet TAKE ONE AND ONE-HALF TABLETS DAILY 135 tablet 3    ipratropium-albuterol (DUONEB) 0.5-2.5 (3) MG/3ML SOLN nebulizer solution USE 1 VIAL IN NEBULIZER EVERY 6 HOURS - for shortness of breath/wheezing 120 vial 11    etanercept (ENBREL) 50 MG/ML injection Inject 50 mg into the skin once a week       Probiotic Product (ALIGN PO) Take by mouth daily      Omega-3 Fatty Acids (FISH OIL PO) Take by mouth      OXYGEN 2 lpm at rest and to sleep and 4 lpm to ambulate (Patient taking differently: 3 lpm at rest and to sleep and 4 lpm to ambulate) 1 Device 6    Methotrexate Sodium, PF, (RHEUMATREX) 1 GM/40ML SOLN chemo injection Inject 15.5 mg into the muscle every 14 days Every other week      therapeutic multivitamin-minerals (THERAGRAN-M) tablet Take 1 tablet by mouth daily.  Calcium Citrate-Vitamin D 200-200 MG-UNIT TABS Take 1 tablet by mouth 2 times daily       Coenzyme Q10 (COQ10) 100 MG CAPS Take 100 mg by mouth daily.  aspirin 81 MG EC tablet Take 81 mg by mouth daily.  folic acid (FOLVITE) 1 MG tablet Take 1 mg by mouth daily. No current facility-administered medications for this visit. Isabella CALERO   Review of Systems   Constitutional: Negative for activity change, appetite change, chills and fever. HENT: Negative. Eyes: Negative. Respiratory: Positive for cough. Negative for chest tightness, shortness of breath and wheezing. Cardiovascular: Negative for chest pain, palpitations and leg swelling. Gastrointestinal: Negative for abdominal pain, diarrhea, nausea and vomiting. Genitourinary: Negative. Musculoskeletal: Positive for arthralgias. Skin: Negative. Neurological: Negative. Hematological: Does not bruise/bleed easily. Psychiatric/Behavioral: Negative for suicidal ideas. Physical exam   /62 (Site: Left Upper Arm, Position: Sitting, Cuff Size: Medium Adult)   Pulse 80   Temp 98 °F (36.7 °C)   Ht 5' 2\" (1.575 m)   Wt 183 lb 12.8 oz (83.4 kg)   SpO2 92% Comment: on 3 liters POC  BMI 33.62 kg/m²      Wt Readings from Last 3 Encounters:   03/02/21 183 lb 12.8 oz (83.4 kg)   11/04/20 192 lb 6.4 oz (87.3 kg)   11/03/20 193 lb (87.5 kg)     Physical Exam  Vitals signs and nursing note reviewed. Constitutional:       General: She is not in acute distress. Appearance: She is well-developed and overweight. Interventions: Nasal cannula and face mask in place. HENT:      Mouth/Throat:      Lips: Pink. Mouth: Mucous membranes are moist.      Pharynx: Oropharynx is clear. No oropharyngeal exudate or posterior oropharyngeal erythema. Eyes:      Conjunctiva/sclera: Conjunctivae normal.   Neck:      Vascular: No JVD.    Cardiovascular:      Rate and Rhythm: Normal rate and regular rhythm. Heart sounds: No murmur. No friction rub. Pulmonary:      Effort: Pulmonary effort is normal. No accessory muscle usage or respiratory distress. Breath sounds: Normal breath sounds. No wheezing, rhonchi or rales. Chest:      Chest wall: No tenderness. Musculoskeletal:      Right lower leg: No edema. Left lower leg: No edema. Skin:     General: Skin is warm and dry. Capillary Refill: Capillary refill takes less than 2 seconds. Nails: There is no clubbing. Neurological:      Mental Status: She is alert. Psychiatric:         Mood and Affect: Mood normal.         Behavior: Behavior normal.         Thought Content: Thought content normal.         Judgment: Judgment normal.          Test results   Lung Nodule Screening     [] Qualifies    [x]Does not qualify   [] Declined    [] Completed    Ct high res. 1/11/2021  FINDINGS:       There is reticulation at the periphery of the lungs more pronounced at the lung bases, left greater than right. This is stable compared to prior exam. There is mild bronchiectasis at the left lung base at the periphery of the left midlung. There is also    mild bronchiectasis at the apices. This is stable compared to prior exam. There is stable biapical pleural scarring. No new focal opacities are identified. There are mildly prominent mediastinal lymph nodes with some calcifications, unchanged compared to    prior exam. The unopacified heart is unremarkable.       Redemonstration of surgical changes from prior gastric bypass surgery and cholecystectomy. Visualized portions of the upper abdominal solid organs are grossly unremarkable. There is a mild dextrocurvature of the thoracic spine.           Impression    Stable nonspecific fibrotic changes with bronchiectasis most pronounced at the left lung base.                   **This report has been created using voice recognition software.  It may contain minor errors which are inherent in voice recognition technology. **       Final report electronically signed by Dr. Justin Rowland MD on 1/11/2021 3:08 PM       In office spirometry : slight change in FeV 1 compared to 2019, pretty stable. FVC reduced,  Fev 1 reduced- likely restrictive component, FEV 1/FVC ratio shows mild change in actual vs. % pred, therefore there is likely some mild obstructive component as well however FEF 25-75% is normal   Electronically signed by PRISCILLA Medrano CNP on 3/2/2021                       Assessment      Diagnosis Orders   1. Rheumatoid lung (HCC)  Spirometry Without Bronchodilator   2. Chronic respiratory failure with hypoxia (HCC)     3. Bronchiectasis without complication ([address]          stable CT chest and spirometry     Plan    -continue Duonnebs  -continue accapella device daily as tolerated  -continue oxygen 3LPM with rest/ sleep and 4LPM at night   -keep f/u's with rheumatology for RA medication management     Total time spent on encounter was 30 minutes    Will see Celena Arteaga in: 1 year    Electronically signed by PRISCILLA Medrano CNP on 3/2/2021 at 2:11 PM     Addendum:  Pt called office, needs new nebulizer machine, hers is broken. Alpha Karley was evaluated today and a DME order was entered for a nebulizer compressor in order to administer Albuterol and Ipratropium due to the diagnosis of bronchiectasis. The need for this equipment and treatment was discussed with the patient and she understands and is in agreement.     Electronically signed by PRISCILLA Medrano CNP on 4/27/2021 at 12:09 PM

## 2021-04-01 DIAGNOSIS — M05.10 RHEUMATOID LUNG (HCC): ICD-10-CM

## 2021-04-01 RX ORDER — IPRATROPIUM BROMIDE AND ALBUTEROL SULFATE 2.5; .5 MG/3ML; MG/3ML
SOLUTION RESPIRATORY (INHALATION)
Qty: 120 VIAL | Refills: 11 | Status: SHIPPED | OUTPATIENT
Start: 2021-04-01 | End: 2021-08-04 | Stop reason: SDUPTHER

## 2021-04-13 ENCOUNTER — OFFICE VISIT (OUTPATIENT)
Dept: FAMILY MEDICINE CLINIC | Age: 86
End: 2021-04-13

## 2021-04-13 VITALS
WEIGHT: 181 LBS | SYSTOLIC BLOOD PRESSURE: 106 MMHG | TEMPERATURE: 97.3 F | BODY MASS INDEX: 33.31 KG/M2 | RESPIRATION RATE: 16 BRPM | DIASTOLIC BLOOD PRESSURE: 54 MMHG | HEIGHT: 62 IN | OXYGEN SATURATION: 94 % | HEART RATE: 96 BPM

## 2021-04-13 DIAGNOSIS — R35.0 URINARY FREQUENCY: Primary | ICD-10-CM

## 2021-04-13 DIAGNOSIS — D84.9 IMMUNOCOMPROMISED (HCC): ICD-10-CM

## 2021-04-13 DIAGNOSIS — I10 ESSENTIAL HYPERTENSION: ICD-10-CM

## 2021-04-13 DIAGNOSIS — E78.5 HYPERLIPIDEMIA, UNSPECIFIED HYPERLIPIDEMIA TYPE: ICD-10-CM

## 2021-04-13 DIAGNOSIS — E66.01 MORBIDLY OBESE (HCC): ICD-10-CM

## 2021-04-13 DIAGNOSIS — J96.10 CHRONIC RESPIRATORY FAILURE, UNSPECIFIED WHETHER WITH HYPOXIA OR HYPERCAPNIA (HCC): ICD-10-CM

## 2021-04-13 DIAGNOSIS — J84.10 PULMONARY FIBROSIS (HCC): ICD-10-CM

## 2021-04-13 LAB
BACTERIA URINE, POC: ABNORMAL
BILIRUBIN URINE: 17 MG/DL
BLOOD, URINE: POSITIVE
CASTS URINE, POC: ABNORMAL
CLARITY: ABNORMAL
COLOR: YELLOW
CRYSTALS URINE, POC: ABNORMAL
EPI CELLS URINE, POC: ABNORMAL
GLUCOSE URINE: ABNORMAL
KETONES, URINE: NEGATIVE
LEUKOCYTE EST, POC: ABNORMAL
NITRITE, URINE: NEGATIVE
PH UA: 6.5 (ref 4.5–8)
PROTEIN UA: POSITIVE
RBC URINE, POC: ABNORMAL
SPECIFIC GRAVITY UA: 1.01 (ref 1–1.03)
UROBILINOGEN, URINE: NORMAL
WBC URINE, POC: ABNORMAL
YEAST URINE, POC: ABNORMAL

## 2021-04-13 PROCEDURE — 99213 OFFICE O/P EST LOW 20 MIN: CPT | Performed by: EMERGENCY MEDICINE

## 2021-04-13 PROCEDURE — 81000 URINALYSIS NONAUTO W/SCOPE: CPT | Performed by: EMERGENCY MEDICINE

## 2021-04-13 ASSESSMENT — ENCOUNTER SYMPTOMS
TROUBLE SWALLOWING: 0
BACK PAIN: 0
CHEST TIGHTNESS: 0
SHORTNESS OF BREATH: 0
ABDOMINAL PAIN: 0
NAUSEA: 0
CONSTIPATION: 1
SINUS PRESSURE: 0
VOMITING: 0
COUGH: 0
WHEEZING: 0
VOICE CHANGE: 0
DIARRHEA: 1
RHINORRHEA: 0
SORE THROAT: 0

## 2021-04-13 NOTE — PROGRESS NOTES
facility-administered medications for this visit. Subjective:      Review of Systems   Constitutional: Negative for appetite change, chills, diaphoresis, fatigue and fever. HENT: Negative for congestion, ear pain, postnasal drip, rhinorrhea, sinus pressure, sneezing, sore throat, trouble swallowing and voice change. Respiratory: Negative for cough, chest tightness, shortness of breath and wheezing. Cardiovascular: Negative for chest pain, palpitations and leg swelling. Gastrointestinal: Positive for constipation and diarrhea. Negative for abdominal pain, nausea and vomiting. Musculoskeletal: Negative for arthralgias, back pain, joint swelling, myalgias, neck pain and neck stiffness. Neurological: Negative for dizziness, syncope, weakness, light-headedness, numbness and headaches. Objective:     BP (!) 106/54 (Site: Right Upper Arm, Position: Sitting, Cuff Size: Large Adult)   Pulse 96   Temp 97.3 °F (36.3 °C) (Skin)   Resp 16   Ht 5' 2\" (1.575 m)   Wt 181 lb (82.1 kg)   SpO2 94% Comment: on 3 liters per nasal canula  BMI 33.11 kg/m²   BP Readings from Last 3 Encounters:   04/13/21 (!) 106/54   03/02/21 118/62   11/04/20 128/70     Wt Readings from Last 3 Encounters:   04/13/21 181 lb (82.1 kg)   03/02/21 183 lb 12.8 oz (83.4 kg)   11/04/20 192 lb 6.4 oz (87.3 kg)       Physical Exam  Vitals signs reviewed. Constitutional:       Appearance: She is well-developed. HENT:      Head: Normocephalic and atraumatic. Right Ear: External ear normal.      Left Ear: External ear normal.      Nose: Nose normal.   Eyes:      General: No scleral icterus. Conjunctiva/sclera: Conjunctivae normal.      Pupils: Pupils are equal, round, and reactive to light. Neck:      Musculoskeletal: Normal range of motion and neck supple. Thyroid: No thyromegaly. Vascular: No JVD. Cardiovascular:      Rate and Rhythm: Normal rate and regular rhythm. Heart sounds: No murmur.  No friction rub. Pulmonary:      Effort: Pulmonary effort is normal.      Breath sounds: Normal breath sounds. No wheezing or rales. Chest:      Chest wall: No tenderness. Abdominal:      General: Bowel sounds are normal.      Palpations: Abdomen is soft. There is no mass. Tenderness: There is no abdominal tenderness. Lymphadenopathy:      Cervical: No cervical adenopathy. Skin:     Findings: No rash. Neurological:      Mental Status: She is alert and oriented to person, place, and time. Psychiatric:         Behavior: Behavior is cooperative. Assessment:         Diagnosis Orders   1. Urinary frequency  POC URINE with Microscopic   2. Essential hypertension     3. Immunocompromised ([address]     4. Morbidly obese ([facility]     5. Hyperlipidemia, unspecified hyperlipidemia type     6. Pulmonary fibrosis ([address]     7. Chronic respiratory failure, unspecified whether with hypoxia or hypercapnia (HCC)         Plan:      Medications Prescribed:  No orders of the defined types were placed in this encounter. Orders Placed:  Orders Placed This Encounter   Procedures    POC URINE with Microscopic     Patient's diarrhea is likely secondary to viral infection    Results of Laboratory tests taken 2/4/21 were reviewed with the patient. Results were w/in  acceptable range     Return in about 1 month (around 5/13/2021). Discussed use, benefit, and side effects of prescribedmedications. All patient questions answered. Pt voiced understanding. Instructedto continue current medications, diet and exercise. Patient agreed with treatmentplan.

## 2021-04-27 ENCOUNTER — TELEPHONE (OUTPATIENT)
Dept: PULMONOLOGY | Age: 86
End: 2021-04-27

## 2021-04-27 NOTE — TELEPHONE ENCOUNTER
Patient's nebulizer machine is not working. Could you please place an order for a new one to be sent to [address]

## 2021-05-11 ENCOUNTER — OFFICE VISIT (OUTPATIENT)
Dept: FAMILY MEDICINE CLINIC | Age: 86
End: 2021-05-11

## 2021-05-11 VITALS
BODY MASS INDEX: 33.31 KG/M2 | HEIGHT: 62 IN | WEIGHT: 181 LBS | SYSTOLIC BLOOD PRESSURE: 124 MMHG | TEMPERATURE: 97.1 F | RESPIRATION RATE: 16 BRPM | DIASTOLIC BLOOD PRESSURE: 60 MMHG | HEART RATE: 74 BPM

## 2021-05-11 DIAGNOSIS — I10 ESSENTIAL HYPERTENSION: ICD-10-CM

## 2021-05-11 DIAGNOSIS — J84.10 PULMONARY FIBROSIS (HCC): ICD-10-CM

## 2021-05-11 DIAGNOSIS — J96.10 CHRONIC RESPIRATORY FAILURE, UNSPECIFIED WHETHER WITH HYPOXIA OR HYPERCAPNIA (HCC): ICD-10-CM

## 2021-05-11 DIAGNOSIS — M05.10 RHEUMATOID LUNG DISEASE WITH RHEUMATOID ARTHRITIS (HCC): ICD-10-CM

## 2021-05-11 DIAGNOSIS — D84.9 IMMUNOCOMPROMISED (HCC): ICD-10-CM

## 2021-05-11 DIAGNOSIS — E11.69 TYPE 2 DIABETES MELLITUS WITH OTHER SPECIFIED COMPLICATION, UNSPECIFIED WHETHER LONG TERM INSULIN USE (HCC): Primary | ICD-10-CM

## 2021-05-11 LAB
CHP ED QC CHECK: ABNORMAL
GLUCOSE BLD-MCNC: 161 MG/DL
HBA1C MFR BLD: 6 %

## 2021-05-11 PROCEDURE — 83036 HEMOGLOBIN GLYCOSYLATED A1C: CPT | Performed by: EMERGENCY MEDICINE

## 2021-05-11 PROCEDURE — 82962 GLUCOSE BLOOD TEST: CPT | Performed by: EMERGENCY MEDICINE

## 2021-05-11 PROCEDURE — 99214 OFFICE O/P EST MOD 30 MIN: CPT | Performed by: EMERGENCY MEDICINE

## 2021-05-11 ASSESSMENT — ENCOUNTER SYMPTOMS
SINUS PRESSURE: 0
RHINORRHEA: 0
CHEST TIGHTNESS: 0
ABDOMINAL PAIN: 0
TROUBLE SWALLOWING: 0
DIARRHEA: 0
VOICE CHANGE: 0
NAUSEA: 0
BACK PAIN: 0
CONSTIPATION: 0
WHEEZING: 0
COUGH: 0
SHORTNESS OF BREATH: 1
SORE THROAT: 0
VOMITING: 0

## 2021-05-11 NOTE — PROGRESS NOTES
Date: 5/11/2021    :    Brianda Hoang is a 80 y. o.female who presents today for:  Chief Complaint   Patient presents with    Diabetes    Hypertension         HPI:     Diarrhea better she had now 1 BM daily. Want a mail deliver to her home . Patient doing well using a walker however she is so frail and can follow any time. CurrentHome Medications:  Current Outpatient Medications   Medication Sig Dispense Refill    methotrexate (RHEUMATREX) 2.5 MG chemo tablet Take 15.5 mg by mouth once a week Take 6 tablets daily      ipratropium-albuterol (DUONEB) 0.5-2.5 (3) MG/3ML SOLN nebulizer solution USE 1 VIAL IN NEBULIZER EVERY 6 HOURS - for shortness of breath/wheezing 120 vial 11    losartan (COZAAR) 50 MG tablet take 1 tablet by mouth once daily 90 tablet 1    hydroCHLOROthiazide (MICROZIDE) 12.5 MG capsule take 1 capsule by mouth every morning 90 capsule 1    pantoprazole (PROTONIX) 40 MG tablet TAKE 1 TABLET DAILY 90 tablet 3    atorvastatin (LIPITOR) 40 MG tablet TAKE 1 TABLET EVERY EVENING 90 tablet 3    metoprolol succinate (TOPROL XL) 50 MG extended release tablet TAKE ONE AND ONE-HALF TABLETS DAILY 135 tablet 3    etanercept (ENBREL) 50 MG/ML injection Inject 50 mg into the skin once a week       Probiotic Product (ALIGN PO) Take by mouth daily      Omega-3 Fatty Acids (FISH OIL PO) Take by mouth      OXYGEN 2 lpm at rest and to sleep and 4 lpm to ambulate (Patient taking differently: 3 lpm at rest and to sleep and 4 lpm to ambulate) 1 Device 6    therapeutic multivitamin-minerals (THERAGRAN-M) tablet Take 1 tablet by mouth daily.  Calcium Citrate-Vitamin D 200-200 MG-UNIT TABS Take 1 tablet by mouth 2 times daily       Coenzyme Q10 (COQ10) 100 MG CAPS Take 100 mg by mouth daily.  aspirin 81 MG EC tablet Take 81 mg by mouth daily.  folic acid (FOLVITE) 1 MG tablet Take 1 mg by mouth daily. No current facility-administered medications for this visit. Chest:      Chest wall: No tenderness. Abdominal:      General: Bowel sounds are normal.      Palpations: Abdomen is soft. There is no mass. Tenderness: There is no abdominal tenderness. Lymphadenopathy:      Cervical: No cervical adenopathy. Skin:     Findings: No rash. Neurological:      Mental Status: She is alert and oriented to person, place, and time. Psychiatric:         Behavior: Behavior is cooperative. Assessment:         Diagnosis Orders   1. Type 2 diabetes mellitus with other specified complication, unspecified whether long term insulin use (HCC)  POCT Glucose    POCT glycosylated hemoglobin (Hb A1C)   2. Chronic respiratory failure, unspecified whether with hypoxia or hypercapnia (HCC) stable with supplemental oxygen    3. Pulmonary fibrosis ([address] stable with supplemental oxygen    4. Immunocompromised ([address] continue using the mask, patient already had flu\" and Covid vaccine    5. 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 ([facility] continue using oxygen    6. Essential hypertension stable        :      Medications Prescribed:  No orders of the defined types were placed in this encounter. Orders Placed:  Orders Placed This Encounter   Procedures    POCT Glucose    POCT glycosylated hemoglobin (Hb A1C)       Lab Results   Component Value Date    LABA1C 6.0 05/11/2021    LABA1C 6.1 11/03/2020    LABA1C 130 (A) 07/30/2020     Lab Results   Component Value Date    LDLCALC 59 02/04/2021    CREATININE 1.13 (H) 02/04/2021       Return in about 3 months (around 8/12/2021) for DM. Discussed use, benefit, and side effects of prescribedmedications. All patient questions answered. Pt voiced understanding. Instructedto continue current medications, diet and exercise. Patient agreed with treatmentplan.

## 2021-05-14 ENCOUNTER — HOSPITAL ENCOUNTER (OUTPATIENT)
Dept: WOMENS IMAGING | Age: 86
Discharge: HOME | End: 2021-05-14
Payer: MEDICARE

## 2021-05-14 DIAGNOSIS — Z12.31 VISIT FOR SCREENING MAMMOGRAM: ICD-10-CM

## 2021-05-14 PROCEDURE — 77067 SCR MAMMO BI INCL CAD: CPT

## 2021-07-06 ENCOUNTER — OFFICE VISIT (OUTPATIENT)
Dept: FAMILY MEDICINE CLINIC | Age: 86
End: 2021-07-06

## 2021-07-06 VITALS
DIASTOLIC BLOOD PRESSURE: 64 MMHG | TEMPERATURE: 95.9 F | SYSTOLIC BLOOD PRESSURE: 104 MMHG | WEIGHT: 173 LBS | HEART RATE: 76 BPM | BODY MASS INDEX: 31.83 KG/M2 | HEIGHT: 62 IN | RESPIRATION RATE: 16 BRPM

## 2021-07-06 DIAGNOSIS — A09 DIARRHEA OF INFECTIOUS ORIGIN: Primary | ICD-10-CM

## 2021-07-06 PROCEDURE — 99213 OFFICE O/P EST LOW 20 MIN: CPT | Performed by: EMERGENCY MEDICINE

## 2021-07-06 ASSESSMENT — ENCOUNTER SYMPTOMS
TROUBLE SWALLOWING: 0
VOMITING: 0
VOICE CHANGE: 0
CONSTIPATION: 0
NAUSEA: 0
SHORTNESS OF BREATH: 0
COUGH: 0
RHINORRHEA: 0
CHEST TIGHTNESS: 0
DIARRHEA: 1
BACK PAIN: 0
WHEEZING: 0
SORE THROAT: 0
SINUS PRESSURE: 0
ABDOMINAL PAIN: 1

## 2021-07-06 NOTE — PROGRESS NOTES
(FOLVITE) 1 MG tablet Take 1 mg by mouth daily. No current facility-administered medications for this visit. Subjective:      Review of Systems   Constitutional: Negative for appetite change, chills, diaphoresis, fatigue and fever. HENT: Negative for congestion, ear pain, postnasal drip, rhinorrhea, sinus pressure, sneezing, sore throat, trouble swallowing and voice change. Respiratory: Negative for cough, chest tightness, shortness of breath and wheezing. Cardiovascular: Negative for chest pain, palpitations and leg swelling. Gastrointestinal: Positive for abdominal pain and diarrhea. Negative for constipation, nausea and vomiting. Musculoskeletal: Negative for arthralgias, back pain, joint swelling, myalgias, neck pain and neck stiffness. Neurological: Negative for dizziness, syncope, weakness, light-headedness, numbness and headaches. Objective:     /64 (Site: Left Upper Arm, Position: Sitting, Cuff Size: Medium Adult)   Pulse 76   Temp 95.9 °F (35.5 °C) (Skin)   Resp 16   Ht 5' 2\" (1.575 m)   Wt 173 lb (78.5 kg)   BMI 31.64 kg/m²   BP Readings from Last 3 Encounters:   07/06/21 104/64   05/11/21 124/60   04/13/21 (!) 106/54     Wt Readings from Last 3 Encounters:   07/06/21 173 lb (78.5 kg)   05/11/21 181 lb (82.1 kg)   04/13/21 181 lb (82.1 kg)       Physical Exam  Vitals reviewed. Constitutional:       Appearance: She is well-developed. HENT:      Head: Normocephalic and atraumatic. Right Ear: External ear normal.      Left Ear: External ear normal.      Nose: Nose normal.   Eyes:      General: No scleral icterus. Conjunctiva/sclera: Conjunctivae normal.      Pupils: Pupils are equal, round, and reactive to light. Neck:      Thyroid: No thyromegaly. Vascular: No JVD. Cardiovascular:      Rate and Rhythm: Normal rate and regular rhythm. Heart sounds: No murmur heard. No friction rub.    Pulmonary:      Effort: Pulmonary effort is normal. Breath sounds: Normal breath sounds. No wheezing or rales. Chest:      Chest wall: No tenderness. Abdominal:      General: Bowel sounds are normal.      Palpations: Abdomen is soft. There is no mass. Tenderness: There is no abdominal tenderness. Musculoskeletal:      Cervical back: Normal range of motion and neck supple. Lymphadenopathy:      Cervical: No cervical adenopathy. Skin:     Findings: No rash. Neurological:      Mental Status: She is alert and oriented to person, place, and time. Psychiatric:         Behavior: Behavior is cooperative. Assessment:         Diagnosis Orders   1. Diarrhea of infectious origin  GI Bacterial Pathogens By PCR       :      Medications Prescribed:  No orders of the defined types were placed in this encounter. Orders Placed:  Orders Placed This Encounter   Procedures    GI Bacterial Pathogens By PCR     Standing Status:   Future     Standing Expiration Date:   7/6/2022       Lab Results   Component Value Date    LABA1C 6.0 05/11/2021    LABA1C 6.1 11/03/2020    LABA1C 130 (A) 07/30/2020     Lab Results   Component Value Date    LDLCALC 59 02/04/2021    CREATININE 1.13 (H) 02/04/2021       No follow-ups on file. Discussed use, benefit, and side effects of prescribedmedications. All patient questions answered. Pt voiced understanding. Instructedto continue current medications, diet and exercise. Patient agreed with treatmentplan.

## 2021-07-20 LAB
CAMPYLOBACTER UPSALIENSIS: NOT DETECTED
E COLI SHIGA TOXIN 1: NOT DETECTED
E COLI SHIGA TOXIN 2: NOT DETECTED
Lab: NOT DETECTED
SALMONELLA PCR: NOT DETECTED
SHIGELLA SP PCR: NOT DETECTED

## 2021-07-22 ENCOUNTER — TELEPHONE (OUTPATIENT)
Dept: FAMILY MEDICINE CLINIC | Age: 86
End: 2021-07-22

## 2021-07-26 ENCOUNTER — TELEPHONE (OUTPATIENT)
Dept: FAMILY MEDICINE CLINIC | Age: 86
End: 2021-07-26

## 2021-07-26 RX ORDER — HYDROCHLOROTHIAZIDE 12.5 MG/1
12.5 CAPSULE, GELATIN COATED ORAL EVERY MORNING
Qty: 90 CAPSULE | Refills: 1 | Status: SHIPPED | OUTPATIENT
Start: 2021-07-26 | End: 2022-02-02 | Stop reason: SDUPTHER

## 2021-07-26 RX ORDER — LOSARTAN POTASSIUM 50 MG/1
TABLET ORAL
Qty: 90 TABLET | Refills: 1 | Status: SHIPPED | OUTPATIENT
Start: 2021-07-26 | End: 2022-03-01

## 2021-07-26 NOTE — TELEPHONE ENCOUNTER
Pt called requesting 90 day Rx for following:    Losartan 50 mg one tablet daily    HCTZ 12.5 mg one capsule every morning.     [facility]    Reminded pt of appt here on 8/17/21

## 2021-07-26 NOTE — TELEPHONE ENCOUNTER
----- Message from Annabel Mosqueda MD sent at 7/25/2021  8:18 PM EDT -----  Call as stool panel all gone     Diarrhea better ?

## 2021-07-26 NOTE — TELEPHONE ENCOUNTER
Date of last visit:  7/6/2021   Date of next visit:  8/17/2021    Requested Prescriptions     Pending Prescriptions Disp Refills    hydroCHLOROthiazide (MICROZIDE) 12.5 MG capsule 90 capsule 1     Sig: Take 1 capsule by mouth every morning    losartan (COZAAR) 50 MG tablet 90 tablet 1     Sig: take 1 tablet by mouth once daily

## 2021-08-04 DIAGNOSIS — M05.10 RHEUMATOID LUNG (HCC): ICD-10-CM

## 2021-08-04 NOTE — TELEPHONE ENCOUNTER
Nancy Kam called requesting a refill on the following medications:  Requested Prescriptions     Pending Prescriptions Disp Refills    ipratropium-albuterol (DUONEB) 0.5-2.5 (3) MG/3ML SOLN nebulizer solution 120 vial 11     Pharmacy verified:[facility]  . pv      Date of last visit:   Date of next visit (if applicable): Visit date not found

## 2021-08-06 RX ORDER — IPRATROPIUM BROMIDE AND ALBUTEROL SULFATE 2.5; .5 MG/3ML; MG/3ML
SOLUTION RESPIRATORY (INHALATION)
Qty: 120 VIAL | Refills: 11 | Status: SHIPPED | OUTPATIENT
Start: 2021-08-06 | End: 2022-03-28 | Stop reason: SDUPTHER

## 2021-08-17 ENCOUNTER — OFFICE VISIT (OUTPATIENT)
Dept: FAMILY MEDICINE CLINIC | Age: 86
End: 2021-08-17

## 2021-08-17 VITALS
RESPIRATION RATE: 16 BRPM | OXYGEN SATURATION: 92 % | HEIGHT: 61 IN | SYSTOLIC BLOOD PRESSURE: 108 MMHG | WEIGHT: 173.13 LBS | DIASTOLIC BLOOD PRESSURE: 62 MMHG | BODY MASS INDEX: 32.69 KG/M2 | HEART RATE: 80 BPM | TEMPERATURE: 96.5 F

## 2021-08-17 DIAGNOSIS — E11.69 TYPE 2 DIABETES MELLITUS WITH OTHER SPECIFIED COMPLICATION, UNSPECIFIED WHETHER LONG TERM INSULIN USE (HCC): Primary | ICD-10-CM

## 2021-08-17 DIAGNOSIS — I10 ESSENTIAL HYPERTENSION: ICD-10-CM

## 2021-08-17 DIAGNOSIS — J84.10 PULMONARY FIBROSIS (HCC): ICD-10-CM

## 2021-08-17 DIAGNOSIS — E78.5 HYPERLIPIDEMIA, UNSPECIFIED HYPERLIPIDEMIA TYPE: ICD-10-CM

## 2021-08-17 LAB
CHP ED QC CHECK: ABNORMAL
GLUCOSE BLD-MCNC: 159 MG/DL
HBA1C MFR BLD: 6.3 %

## 2021-08-17 PROCEDURE — 82962 GLUCOSE BLOOD TEST: CPT | Performed by: EMERGENCY MEDICINE

## 2021-08-17 PROCEDURE — 99213 OFFICE O/P EST LOW 20 MIN: CPT | Performed by: EMERGENCY MEDICINE

## 2021-08-17 PROCEDURE — 83036 HEMOGLOBIN GLYCOSYLATED A1C: CPT | Performed by: EMERGENCY MEDICINE

## 2021-08-17 ASSESSMENT — ENCOUNTER SYMPTOMS
NAUSEA: 0
BACK PAIN: 0
CONSTIPATION: 0
SHORTNESS OF BREATH: 0
RHINORRHEA: 0
DIARRHEA: 0
VOICE CHANGE: 0
ABDOMINAL PAIN: 0
COUGH: 0
CHEST TIGHTNESS: 0
TROUBLE SWALLOWING: 0
SORE THROAT: 0
VOMITING: 0
SINUS PRESSURE: 0
WHEEZING: 0
VISUAL CHANGE: 0

## 2021-08-17 NOTE — PROGRESS NOTES
MG/ML injection Inject 50 mg into the skin once a week       Probiotic Product (ALIGN PO) Take by mouth daily      Omega-3 Fatty Acids (FISH OIL PO) Take by mouth      OXYGEN 2 lpm at rest and to sleep and 4 lpm to ambulate (Patient taking differently: 3 lpm at rest and to sleep and 4 lpm to ambulate) 1 Device 6    therapeutic multivitamin-minerals (THERAGRAN-M) tablet Take 1 tablet by mouth daily.  Calcium Citrate-Vitamin D 200-200 MG-UNIT TABS Take 1 tablet by mouth 2 times daily       Coenzyme Q10 (COQ10) 100 MG CAPS Take 100 mg by mouth daily.  aspirin 81 MG EC tablet Take 81 mg by mouth daily.  folic acid (FOLVITE) 1 MG tablet Take 1 mg by mouth daily. No current facility-administered medications for this visit. Subjective:      Review of Systems   Constitutional: Negative for appetite change, chills, diaphoresis, fatigue and fever. HENT: Negative for congestion, ear pain, postnasal drip, rhinorrhea, sinus pressure, sneezing, sore throat, trouble swallowing and voice change. Respiratory: Negative for cough, chest tightness, shortness of breath and wheezing. Cardiovascular: Negative for chest pain, palpitations and leg swelling. Gastrointestinal: Negative for abdominal pain, constipation, diarrhea, nausea and vomiting. Musculoskeletal: Negative for arthralgias, back pain, joint swelling, myalgias, neck pain and neck stiffness. Neurological: Negative for dizziness, syncope, speech difficulty, weakness, light-headedness, numbness and headaches. Psychiatric/Behavioral: The patient is not nervous/anxious.         Objective:     /62 (Site: Left Upper Arm, Position: Sitting, Cuff Size: Large Adult)   Pulse 80   Temp 96.5 °F (35.8 °C) (Skin)   Resp 16   Ht 5' 1\" (1.549 m)   Wt 173 lb 2 oz (78.5 kg)   SpO2 92% Comment: on 3 liters per nasal canula  BMI 32.71 kg/m²   BP Readings from Last 3 Encounters:   08/17/21 108/62   07/06/21 104/64   05/11/21 124/60     Wt Readings from Last 3 Encounters:   08/17/21 173 lb 2 oz (78.5 kg)   07/06/21 173 lb (78.5 kg)   05/11/21 181 lb (82.1 kg)       Physical Exam  Vitals reviewed. Constitutional:       Appearance: She is well-developed. HENT:      Head: Normocephalic and atraumatic. Right Ear: External ear normal.      Left Ear: External ear normal.      Nose: Nose normal.   Eyes:      General: No scleral icterus. Conjunctiva/sclera: Conjunctivae normal.      Pupils: Pupils are equal, round, and reactive to light. Neck:      Thyroid: No thyromegaly. Vascular: No JVD. Cardiovascular:      Rate and Rhythm: Normal rate and regular rhythm. Heart sounds: No murmur heard. No friction rub. Pulmonary:      Effort: Pulmonary effort is normal.      Breath sounds: Examination of the right-lower field reveals rales. Examination of the left-lower field reveals rales. Rales present. No wheezing. Chest:      Chest wall: No tenderness. Abdominal:      General: Bowel sounds are normal.      Palpations: Abdomen is soft. There is no mass. Tenderness: There is no abdominal tenderness. Musculoskeletal:      Cervical back: Normal range of motion and neck supple. Lymphadenopathy:      Cervical: No cervical adenopathy. Skin:     Findings: No rash. Neurological:      Mental Status: She is alert and oriented to person, place, and time. Psychiatric:         Behavior: Behavior is cooperative. Assessment:         Diagnosis Orders   1. Type 2 diabetes mellitus with other specified complication, unspecified whether long term insulin use (HCC)  POCT glycosylated hemoglobin (Hb A1C)    POCT Glucose    CBC Auto Differential   2. Hyperlipidemia, unspecified hyperlipidemia type  Comprehensive Metabolic Panel    Lipid Panel   3. Essential hypertension  CBC Auto Differential    Comprehensive Metabolic Panel    Lipid Panel   4.  Pulmonary fibrosis (HCC)         :      Medications Prescribed:  No orders of the defined types were placed in this encounter. Orders Placed:  Orders Placed This Encounter   Procedures    CBC Auto Differential     Laboratory Test to be done in 3 months     Standing Status:   Future     Standing Expiration Date:   8/17/2022    Comprehensive Metabolic Panel     Laboratory Test to be done in 3 months     Standing Status:   Future     Standing Expiration Date:   8/17/2022    Lipid Panel     Laboratory Test to be done in 3 months     Standing Status:   Future     Standing Expiration Date:   8/17/2022     Order Specific Question:   Is Patient Fasting?/# of Hours     Answer:   12    POCT glycosylated hemoglobin (Hb A1C)    POCT Glucose       Lab Results   Component Value Date    LABA1C 6.3 08/17/2021    LABA1C 6.0 05/11/2021    LABA1C 6.1 11/03/2020     Lab Results   Component Value Date    LDLCALC 59 02/04/2021    CREATININE 1.13 (H) 02/04/2021       Return in about 3 months (around 11/18/2021) for DM. Discussed use, benefit, and side effects of prescribedmedications. All patient questions answered. Pt voiced understanding. Instructedto continue current medications, diet and exercise. Patient agreed with treatmentplan.

## 2021-10-07 ENCOUNTER — TELEPHONE (OUTPATIENT)
Dept: FAMILY MEDICINE CLINIC | Age: 86
End: 2021-10-07

## 2021-10-07 RX ORDER — CEPHALEXIN 500 MG/1
1000 CAPSULE ORAL 2 TIMES DAILY
Qty: 30 CAPSULE | Refills: 0 | Status: SHIPPED | OUTPATIENT
Start: 2021-10-07 | End: 2022-10-27 | Stop reason: SDUPTHER

## 2021-10-07 RX ORDER — ETANERCEPT 50 MG/ML
SOLUTION SUBCUTANEOUS
COMMUNITY
Start: 2021-09-27 | End: 2022-09-20 | Stop reason: SDUPTHER

## 2021-10-07 NOTE — TELEPHONE ENCOUNTER
ERX Keflex 500 mg 2 capsule 1 hour prior to procedure then 2 capsule at dinner    Please call the patient

## 2021-11-02 ENCOUNTER — TELEPHONE (OUTPATIENT)
Dept: FAMILY MEDICINE CLINIC | Age: 86
End: 2021-11-02

## 2021-11-02 ENCOUNTER — OFFICE VISIT (OUTPATIENT)
Dept: FAMILY MEDICINE CLINIC | Age: 86
End: 2021-11-02

## 2021-11-02 ENCOUNTER — NURSE ONLY (OUTPATIENT)
Dept: FAMILY MEDICINE CLINIC | Age: 86
End: 2021-11-02

## 2021-11-02 VITALS
TEMPERATURE: 95.3 F | BODY MASS INDEX: 32.55 KG/M2 | RESPIRATION RATE: 14 BRPM | HEART RATE: 82 BPM | DIASTOLIC BLOOD PRESSURE: 76 MMHG | HEIGHT: 61 IN | SYSTOLIC BLOOD PRESSURE: 118 MMHG | WEIGHT: 172.38 LBS

## 2021-11-02 DIAGNOSIS — R35.0 URINARY FREQUENCY: Primary | ICD-10-CM

## 2021-11-02 DIAGNOSIS — I10 ESSENTIAL HYPERTENSION: Primary | ICD-10-CM

## 2021-11-02 LAB
BACTERIA URINE, POC: NORMAL
BILIRUBIN URINE: 0 MG/DL
BLOOD, URINE: NEGATIVE
CASTS URINE, POC: NORMAL
CLARITY: CLEAR
COLOR: YELLOW
CRYSTALS URINE, POC: NORMAL
EPI CELLS URINE, POC: NORMAL
GLUCOSE URINE: NORMAL
KETONES, URINE: NEGATIVE
LEUKOCYTE EST, POC: NORMAL
NITRITE, URINE: NEGATIVE
PH UA: 6.5 (ref 4.5–8)
PROTEIN UA: NEGATIVE
RBC URINE, POC: NORMAL
SPECIFIC GRAVITY UA: 1.01 (ref 1–1.03)
UROBILINOGEN, URINE: NORMAL
WBC URINE, POC: NORMAL
YEAST URINE, POC: NORMAL

## 2021-11-02 PROCEDURE — 99213 OFFICE O/P EST LOW 20 MIN: CPT | Performed by: EMERGENCY MEDICINE

## 2021-11-02 PROCEDURE — 81000 URINALYSIS NONAUTO W/SCOPE: CPT | Performed by: EMERGENCY MEDICINE

## 2021-11-02 ASSESSMENT — ENCOUNTER SYMPTOMS
SORE THROAT: 0
RHINORRHEA: 0
SHORTNESS OF BREATH: 0
SINUS PRESSURE: 0
VOICE CHANGE: 0
COUGH: 0
CHEST TIGHTNESS: 0
NAUSEA: 0
TROUBLE SWALLOWING: 0
ABDOMINAL PAIN: 0
DIARRHEA: 0
CONSTIPATION: 0
VOMITING: 0
WHEEZING: 0
BACK PAIN: 0

## 2021-11-02 NOTE — PROGRESS NOTES
Date: 11/2/2021    :    Luz Pedersen is a 80 y. o.female who presents today for:  Chief Complaint   Patient presents with    Hypotension     pt states she has not taken her bp meds this morning    Other     pt stated she just doesnt feel right. HPI:     HPI    2 weeks ago BP at the eye doctor 99/60. Today 116/76     Yesterday dizzy when bend over, Did not take the Losartan today but took Toprol and HCTZ. Had he Covid vaccine 10/28/21    No fever no chill. SOB with ambulation however this is not something new she uses oxygen which is the same, did not have any urinary symptoms dysuria frequency hematuria no abdominal pain.     Dr Shelley Brunner this 11/4/21      CurrentHome Medications:  Current Outpatient Medications   Medication Sig Dispense Refill    ENBREL SURECLICK 50 MG/ML SOAJ       ipratropium-albuterol (DUONEB) 0.5-2.5 (3) MG/3ML SOLN nebulizer solution USE 1 VIAL IN NEBULIZER EVERY 6 HOURS - for shortness of breath/wheezing 120 vial 11    hydroCHLOROthiazide (MICROZIDE) 12.5 MG capsule Take 1 capsule by mouth every morning 90 capsule 1    losartan (COZAAR) 50 MG tablet take 1 tablet by mouth once daily 90 tablet 1    methotrexate (RHEUMATREX) 2.5 MG chemo tablet Take 15.5 mg by mouth once a week Take 6 tablets daily      pantoprazole (PROTONIX) 40 MG tablet TAKE 1 TABLET DAILY 90 tablet 3    atorvastatin (LIPITOR) 40 MG tablet TAKE 1 TABLET EVERY EVENING 90 tablet 3    metoprolol succinate (TOPROL XL) 50 MG extended release tablet TAKE ONE AND ONE-HALF TABLETS DAILY 135 tablet 3    etanercept (ENBREL) 50 MG/ML injection Inject 50 mg into the skin once a week       Probiotic Product (ALIGN PO) Take by mouth daily      Omega-3 Fatty Acids (FISH OIL PO) Take by mouth      OXYGEN 2 lpm at rest and to sleep and 4 lpm to ambulate (Patient taking differently: 3 lpm at rest and to sleep and 4 lpm to ambulate) 1 Device 6    therapeutic multivitamin-minerals (THERAGRAN-M) tablet Take 1 tablet by mouth daily.  Calcium Citrate-Vitamin D 200-200 MG-UNIT TABS Take 1 tablet by mouth 2 times daily       Coenzyme Q10 (COQ10) 100 MG CAPS Take 100 mg by mouth daily.  aspirin 81 MG EC tablet Take 81 mg by mouth daily.  folic acid (FOLVITE) 1 MG tablet Take 1 mg by mouth daily. No current facility-administered medications for this visit. Subjective:      Review of Systems   Constitutional: Negative for appetite change, chills, diaphoresis, fatigue and fever. HENT: Negative for congestion, ear pain, postnasal drip, rhinorrhea, sinus pressure, sneezing, sore throat, trouble swallowing and voice change. Respiratory: Negative for cough, chest tightness, shortness of breath and wheezing. Cardiovascular: Negative for chest pain, palpitations and leg swelling. Gastrointestinal: Negative for abdominal pain, constipation, diarrhea, nausea and vomiting. Musculoskeletal: Positive for myalgias. Negative for arthralgias, back pain, joint swelling, neck pain and neck stiffness. Neurological: Negative for dizziness, syncope, weakness, light-headedness, numbness and headaches. Objective:     /76 (Site: Left Upper Arm, Position: Sitting, Cuff Size: Medium Adult)   Pulse 82   Temp 95.3 °F (35.2 °C) (Skin)   Resp 14   Ht 5' 1\" (1.549 m)   Wt 172 lb 6 oz (78.2 kg)   BMI 32.57 kg/m²   BP Readings from Last 3 Encounters:   11/02/21 118/76   08/17/21 108/62   07/06/21 104/64     Wt Readings from Last 3 Encounters:   11/02/21 172 lb 6 oz (78.2 kg)   08/17/21 173 lb 2 oz (78.5 kg)   07/06/21 173 lb (78.5 kg)       Physical Exam  Vitals reviewed. Constitutional:       Appearance: She is well-developed. HENT:      Head: Normocephalic and atraumatic. Right Ear: External ear normal.      Left Ear: External ear normal.      Nose: Nose normal.   Eyes:      General: No scleral icterus.      Conjunctiva/sclera: Conjunctivae normal.      Pupils: Pupils are equal, round, and reactive to light. Neck:      Thyroid: No thyromegaly. Vascular: No JVD. Cardiovascular:      Rate and Rhythm: Normal rate and regular rhythm. Heart sounds: No murmur heard. No friction rub. Pulmonary:      Effort: Pulmonary effort is normal.      Breath sounds: Rales (bilateral lungs) present. No wheezing. Chest:      Chest wall: No tenderness. Abdominal:      General: Bowel sounds are normal.      Palpations: Abdomen is soft. There is no mass. Tenderness: There is no abdominal tenderness. Musculoskeletal:      Cervical back: Normal range of motion and neck supple. Lymphadenopathy:      Cervical: No cervical adenopathy. Skin:     Findings: No rash. Neurological:      Mental Status: She is alert and oriented to person, place, and time. Psychiatric:         Behavior: Behavior is cooperative. Assessment:         Diagnosis Orders   1. Essential hypertension         :      Medications Prescribed:  No orders of the defined types were placed in this encounter. Orders Placed:  No orders of the defined types were placed in this encounter. Lab Results   Component Value Date    LABA1C 6.3 08/17/2021    LABA1C 6.0 05/11/2021    LABA1C 6.1 11/03/2020     Lab Results   Component Value Date    LDLCALC 59 02/04/2021    CREATININE 1.13 (H) 02/04/2021     Advised that she can hold on her Losartan , check BP 2 x times a day and resume if gets higher 140/90 and above    Needs Urine check. Patient was given a container as she cannot urinate here in the office    No follow-ups on file. Discussed use, benefit, and side effects of prescribedmedications. All patient questions answered. Pt voiced understanding. Instructedto continue current medications, diet and exercise. Patient agreed with treatmentplan.

## 2021-11-04 ENCOUNTER — OFFICE VISIT (OUTPATIENT)
Dept: CARDIOLOGY CLINIC | Age: 86
End: 2021-11-04
Payer: MEDICARE

## 2021-11-04 VITALS
DIASTOLIC BLOOD PRESSURE: 60 MMHG | BODY MASS INDEX: 32.5 KG/M2 | WEIGHT: 172 LBS | SYSTOLIC BLOOD PRESSURE: 122 MMHG | HEART RATE: 91 BPM

## 2021-11-04 DIAGNOSIS — J84.10 PULMONARY FIBROSIS (HCC): ICD-10-CM

## 2021-11-04 DIAGNOSIS — I25.10 CORONARY ARTERY DISEASE INVOLVING NATIVE CORONARY ARTERY WITHOUT ANGINA PECTORIS, UNSPECIFIED WHETHER NATIVE OR TRANSPLANTED HEART: Primary | ICD-10-CM

## 2021-11-04 PROCEDURE — 93000 ELECTROCARDIOGRAM COMPLETE: CPT | Performed by: NUCLEAR MEDICINE

## 2021-11-04 PROCEDURE — 99213 OFFICE O/P EST LOW 20 MIN: CPT | Performed by: NUCLEAR MEDICINE

## 2021-11-04 RX ORDER — METOPROLOL SUCCINATE 50 MG/1
TABLET, EXTENDED RELEASE ORAL
Qty: 135 TABLET | Refills: 3 | Status: SHIPPED | OUTPATIENT
Start: 2021-11-04 | End: 2022-03-28

## 2021-11-04 RX ORDER — ATORVASTATIN CALCIUM 40 MG/1
TABLET, FILM COATED ORAL
Qty: 90 TABLET | Refills: 3 | Status: SHIPPED | OUTPATIENT
Start: 2021-11-04

## 2021-11-04 NOTE — PROGRESS NOTES
[id number] [facility] .  [address] [place] [id number]  Dept: [phone]  Dept Fax: [phone]  Loc: [phone]    Visit Date: 11/4/2021    Chris Peres is a 80 y.o. female who presents todayfor:  Chief Complaint   Patient presents with    Check-Up     EKG done today    Coronary Artery Disease    Hypertension   does have pulmonary fibrosis   On home O2  No chest pain   No changes in breathing  Not much palpitation   No dizziness  No syncope        HPI:  HPI  Past Medical History:   Diagnosis Date    Acid reflux     Anxiety     Arrhythmia     Arthritis     Breast cancer ([address] 11/1998    left    Bronchiectasis ([address] 09/30/2015    CAD (coronary artery disease)     NON-OBSTRUCTIVE    Cancer ([address]     breast    Diabetes mellitus ([address]     Heart palpitations     HX OF:    History of therapeutic radiation 1998    left breast cancer    Hyperlipidemia     Hypertension     ILD (interstitial lung disease) ([address] 09/30/2015    w/ Bronchectasis    Osteoarthritis     rhuematoid    Rheumatoid arthritis ([address]     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      Past Surgical History:   Procedure Laterality Date    ABDOMINAL HERNIA REPAIR  9/2013    BREAST LUMPECTOMY  12/10/1998    CARDIOVASCULAR STRESS TEST  06/17/2010    EF 71%    CARDIOVASCULAR STRESS TEST  01/28/2008    EF 60%    CARDIOVASCULAR STRESS TEST  01/22/2005    CATARACT REMOVAL Bilateral     Dr Phillip Huang - 6/27/17 right, 7/10/17 left    CATARACT REMOVAL      [facility]LECY[facility]  09/20/2008    [facility]     COLONOSCOPY      HAMMER TOE SURGERY Left 09/2018    [facility]01/2010    [facility]    HIATAL [facility]PA[facility]  09/15/2009    [facility]    JOINT REPLACEMENT  5/29/02    left knee    TONSILLECTOMY AND ADENOIDECTOMY  1945    TOTAL KNEE ARTHROPLASTY Left 2002    TRANSTHORACIC ECHOCARDIOGRAM  01/28/2005    EF 70%    UPPER GASTROINTESTINAL ENDOSCOPY  03/21/2017     Family History   Problem Relation Age of Onset    Heart Disease Mother     High Cholesterol Mother     Stroke Mother     Diabetes Mother     Heart Disease Father     High Blood Pressure Father     Pacemaker Father     Ovarian Cancer Daughter 40    Breast Cancer Maternal Aunt [address]    Breast Cancer Paternal Aunt 54     Social History     Tobacco Use    Smoking status: Never Smoker    Smokeless tobacco: Never Used   Substance Use Topics    Alcohol use: Yes     Alcohol/week: 0.0 standard drinks     Comment: RARE      Current Outpatient Medications   Medication Sig Dispense Refill    ENBREL SURECLICK 50 MG/ML SOAJ       ipratropium-albuterol (DUONEB) 0.5-2.5 (3) MG/3ML SOLN nebulizer solution USE 1 VIAL IN NEBULIZER EVERY 6 HOURS - for shortness of breath/wheezing 120 vial 11    hydroCHLOROthiazide (MICROZIDE) 12.5 MG capsule Take 1 capsule by mouth every morning 90 capsule 1    methotrexate (RHEUMATREX) 2.5 MG chemo tablet Take 15.5 mg by mouth once a week Take 6 tablets daily      pantoprazole (PROTONIX) 40 MG tablet TAKE 1 TABLET DAILY 90 tablet 3    atorvastatin (LIPITOR) 40 MG tablet TAKE 1 TABLET EVERY EVENING 90 tablet 3    metoprolol succinate (TOPROL XL) 50 MG extended release tablet TAKE ONE AND ONE-HALF TABLETS DAILY 135 tablet 3    etanercept (ENBREL) 50 MG/ML injection Inject 50 mg into the skin once a week       Probiotic Product (ALIGN PO) Take by mouth daily      Omega-3 Fatty Acids (FISH OIL PO) Take by mouth      OXYGEN 2 lpm at rest and to sleep and 4 lpm to ambulate (Patient taking differently: 3 lpm at rest and to sleep and 4 lpm to ambulate) 1 Device 6    therapeutic multivitamin-minerals (THERAGRAN-M) tablet Take 1 tablet by mouth daily.  Calcium Citrate-Vitamin D 200-200 MG-UNIT TABS Take 1 tablet by mouth 2 times daily       Coenzyme Q10 (COQ10) 100 MG CAPS Take 100 mg by mouth daily.         aspirin 81 MG EC tablet Take 81 mg by mouth daily.  folic acid (FOLVITE) 1 MG tablet Take 1 mg by mouth daily.  losartan (COZAAR) 50 MG tablet take 1 tablet by mouth once daily (Patient not taking: Reported on 11/4/2021) 90 tablet 1     No current facility-administered medications for this visit. Allergies   Allergen Reactions    Sulfa Antibiotics Nausea Only    Amoxicillin Itching and Rash     Health Maintenance   Topic Date Due    Annual Wellness Visit (AWV)  07/31/2021    Lipid screen  02/04/2022    Potassium monitoring  02/04/2022    Creatinine monitoring  02/04/2022    DTaP/Tdap/Td vaccine (2 - Td or Tdap) 11/01/2026    Flu vaccine  Completed    Shingles Vaccine  Completed    Pneumococcal 65+ years Vaccine  Completed    COVID-19 Vaccine  Completed    Hepatitis A vaccine  Aged Out    Hib vaccine  Aged Out    Meningococcal (ACWY) vaccine  Aged Out       Subjective:  Review of Systems  General:   No fever, no chills, No fatigue or weight loss  Pulmonary:    baseline dyspnea, no wheezing  Cardiac:    Denies recent chest pain,   GI:     No nausea or vomiting, no abdominal pain  Neuro:    No dizziness or light headedness,   Musculoskeletal:  No recent active issues  Extremities:   No edema, no obvious claudication       Objective:  Physical Exam  /60   Pulse 91   Wt 172 lb (78 kg)   BMI 32.50 kg/m²   General:   Well developed, well nourished  Lungs:   Clear to auscultation  Heart:    Normal S1 S2, Slight murmur. no rubs, no gallops  Abdomen:   Soft, non tender, no organomegalies, positive bowel sounds  Extremities:   No edema, no cyanosis, good peripheral pulses  Neurological:   Awake, alert, oriented. No obvious focal deficits  Musculoskelatal:  No obvious deformities    Assessment:      Diagnosis Orders   1. Coronary artery disease involving native coronary artery without angina pectoris, unspecified whether native or transplanted heart  EKG 12 Lead   2.  Pulmonary fibrosis ([address]     as above  Higher risk patient   ECG in office was done today. I reviewed the ECG. No acute findings    Plan:  No follow-ups on file. As above  Continue risk factor modification and medical management  Thank you for allowing me to participate in the care of your patient. Please don't hesitate to contact me regarding any further issues related to the patient care    Orders Placed:  Orders Placed This Encounter   Procedures    EKG 12 Lead     Order Specific Question:   Reason for Exam?     Answer: Other       Medications Prescribed:  No orders of the defined types were placed in this encounter. Discussed use, benefit, and side effects of prescribed medications. All patient questions answered. Pt voicedunderstanding. Instructed to continue current medications, diet and exercise. Continue risk factor modification and medical management. Patient agreed with treatment plan. Follow up as directed.     Electronically signedby Karla Ruff MD on 11/4/2021 at 12:25 PM

## 2021-11-20 LAB
ABSOLUTE BASO #: 0.1 X10E9/L (ref 0–0.2)
ABSOLUTE EOS #: 0.4 X10E9/L (ref 0–0.4)
ABSOLUTE LYMPH #: 1.6 X10E9/L (ref 1–3.5)
ABSOLUTE MONO #: 0.6 X10E9/L (ref 0–0.9)
ABSOLUTE NEUT #: 3.9 X10E9/L (ref 1.5–6.6)
ALBUMIN SERPL-MCNC: 4.2 G/DL (ref 3.2–5.3)
ALK PHOSPHATASE: 66 U/L (ref 39–130)
ALT SERPL-CCNC: 13 U/L (ref 0–31)
ANION GAP SERPL CALCULATED.3IONS-SCNC: 8 MMOL/L (ref 5–15)
AST SERPL-CCNC: 23 U/L (ref 0–41)
BASOPHILS RELATIVE PERCENT: 0.8 %
BILIRUB SERPL-MCNC: 0.5 MG/DL (ref 0.3–1.2)
BUN BLDV-MCNC: 24 MG/DL (ref 5–27)
CALCIUM SERPL-MCNC: 9.9 MG/DL (ref 8.5–10.5)
CHLORIDE BLD-SCNC: 98 MMOL/L (ref 98–109)
CHOLESTEROL/HDL RATIO: 2.1 (ref 1–5)
CHOLESTEROL: 167 MG/DL (ref 150–200)
CO2: 34 MMOL/L (ref 22–32)
CREAT SERPL-MCNC: 1.11 MG/DL (ref 0.4–1)
EGFR AFRICAN AMERICAN: 56 ML/MIN/1.73SQ.M
EGFR IF NONAFRICAN AMERICAN: 47 ML/MIN/1.73SQ.M
EOSINOPHILS RELATIVE PERCENT: 6.1 %
GLUCOSE: 144 MG/DL (ref 65–99)
HCT VFR BLD CALC: 36.7 % (ref 35–47)
HDLC SERPL-MCNC: 79 MG/DL
HEMOGLOBIN: 12.2 G/DL (ref 11.7–15.5)
LDL CHOLESTEROL CALCULATED: 64 MG/DL
LDL/HDL RATIO: 0.8
LYMPHOCYTE %: 24.4 %
MCH RBC QN AUTO: 31.1 PG (ref 27–34)
MCHC RBC AUTO-ENTMCNC: 33.3 G/DL (ref 32–36)
MCV RBC AUTO: 94 FL (ref 80–100)
MONOCYTES # BLD: 9.6 %
NEUTROPHILS RELATIVE PERCENT: 59.1 %
PDW BLD-RTO: 15.3 % (ref 11.5–15)
PLATELETS: 155 X10E9/L (ref 150–450)
PMV BLD AUTO: 7.8 FL (ref 7–12)
POTASSIUM SERPL-SCNC: 3.8 MMOL/L (ref 3.5–5)
RBC: 3.92 X10E12/L (ref 3.8–5.2)
SODIUM BLD-SCNC: 140 MMOL/L (ref 134–146)
TOTAL PROTEIN: 7.5 G/DL (ref 6–8)
TRIGL SERPL-MCNC: 118 MG/DL (ref 27–150)
VLDLC SERPL CALC-MCNC: 24 MG/DL (ref 0–30)
WBC: 6.6 X10E9/L (ref 4–11)

## 2021-11-22 ENCOUNTER — TELEPHONE (OUTPATIENT)
Dept: FAMILY MEDICINE CLINIC | Age: 86
End: 2021-11-22

## 2021-11-22 NOTE — TELEPHONE ENCOUNTER
----- Message from Queenie Mancilla MD sent at 11/22/2021 11:22 AM EST -----  Please call patient recent laboratories are within normal ranges they are stable she is no longer having anemia

## 2021-11-23 ENCOUNTER — OFFICE VISIT (OUTPATIENT)
Dept: FAMILY MEDICINE CLINIC | Age: 86
End: 2021-11-23

## 2021-11-23 VITALS
HEIGHT: 61 IN | TEMPERATURE: 96.6 F | SYSTOLIC BLOOD PRESSURE: 120 MMHG | WEIGHT: 172.25 LBS | OXYGEN SATURATION: 91 % | DIASTOLIC BLOOD PRESSURE: 62 MMHG | HEART RATE: 86 BPM | BODY MASS INDEX: 32.52 KG/M2 | RESPIRATION RATE: 16 BRPM

## 2021-11-23 DIAGNOSIS — I10 ESSENTIAL HYPERTENSION: ICD-10-CM

## 2021-11-23 DIAGNOSIS — E11.69 TYPE 2 DIABETES MELLITUS WITH OTHER SPECIFIED COMPLICATION, UNSPECIFIED WHETHER LONG TERM INSULIN USE (HCC): ICD-10-CM

## 2021-11-23 DIAGNOSIS — M47.818 SI JOINT ARTHRITIS: ICD-10-CM

## 2021-11-23 DIAGNOSIS — Z00.00 ROUTINE GENERAL MEDICAL EXAMINATION AT A HEALTH CARE FACILITY: Primary | ICD-10-CM

## 2021-11-23 DIAGNOSIS — J84.10 PULMONARY FIBROSIS (HCC): ICD-10-CM

## 2021-11-23 DIAGNOSIS — J96.10 CHRONIC RESPIRATORY FAILURE, UNSPECIFIED WHETHER WITH HYPOXIA OR HYPERCAPNIA (HCC): ICD-10-CM

## 2021-11-23 LAB
CHP ED QC CHECK: ABNORMAL
GLUCOSE BLD-MCNC: 144 MG/DL
HBA1C MFR BLD: 5.9 %

## 2021-11-23 PROCEDURE — 82962 GLUCOSE BLOOD TEST: CPT | Performed by: EMERGENCY MEDICINE

## 2021-11-23 PROCEDURE — G0439 PPPS, SUBSEQ VISIT: HCPCS | Performed by: EMERGENCY MEDICINE

## 2021-11-23 PROCEDURE — 83036 HEMOGLOBIN GLYCOSYLATED A1C: CPT | Performed by: EMERGENCY MEDICINE

## 2021-11-23 PROCEDURE — 99213 OFFICE O/P EST LOW 20 MIN: CPT | Performed by: EMERGENCY MEDICINE

## 2021-11-23 ASSESSMENT — PATIENT HEALTH QUESTIONNAIRE - PHQ9
2. FEELING DOWN, DEPRESSED OR HOPELESS: 0
1. LITTLE INTEREST OR PLEASURE IN DOING THINGS: 0
SUM OF ALL RESPONSES TO PHQ QUESTIONS 1-9: 0
SUM OF ALL RESPONSES TO PHQ QUESTIONS 1-9: 0
SUM OF ALL RESPONSES TO PHQ9 QUESTIONS 1 & 2: 0
SUM OF ALL RESPONSES TO PHQ QUESTIONS 1-9: 0

## 2021-11-23 ASSESSMENT — ENCOUNTER SYMPTOMS
SORE THROAT: 0
TROUBLE SWALLOWING: 0
VISUAL CHANGE: 0
RHINORRHEA: 0
CHEST TIGHTNESS: 0
CONSTIPATION: 0
SINUS PRESSURE: 0
VOMITING: 0
COUGH: 0
ABDOMINAL PAIN: 0
BACK PAIN: 1
SHORTNESS OF BREATH: 0
VOICE CHANGE: 0
DIARRHEA: 0
WHEEZING: 0
NAUSEA: 0

## 2021-11-23 ASSESSMENT — LIFESTYLE VARIABLES: HOW OFTEN DO YOU HAVE A DRINK CONTAINING ALCOHOL: 0

## 2021-11-23 NOTE — PROGRESS NOTES
Medicare Annual Wellness Visit  Name: Cade Rolle Date: 2021   MRN: [id number] Sex: Female   Age: 80 y.o. Ethnicity: Non- / Non    : 1935 Race: White (non-)      Deedee Vargas is here for Check-Up, Diabetes, and Medicare AWV    Screenings for behavioral, psychosocial and functional/safety risks, and cognitive dysfunction are all negative except as indicated below. These results, as well as other patient data from the [address] form, are documented in Flowsheets linked to this Encounter. Allergies   Allergen Reactions    Sulfa Antibiotics Nausea Only    Amoxicillin Itching and Rash         Prior to Visit Medications    Medication Sig Taking?  Authorizing Provider   atorvastatin (LIPITOR) 40 MG tablet TAKE 1 TABLET EVERY EVENING Yes Fawn Kessler MD   metoprolol succinate (TOPROL XL) 50 MG extended release tablet TAKE ONE AND ONE-HALF TABLETS DAILY Yes Fawn Kessler MD   ENBREL SURECLICK 48 MG/ML SOAJ  Yes Historical Provider, MD   ipratropium-albuterol (DUONEB) 0.5-2.5 (3) MG/3ML SOLN nebulizer solution USE 1 VIAL IN NEBULIZER EVERY 6 HOURS - for shortness of breath/wheezing Yes [facility] - [facility]   hydroCHLOROthiazide (MICROZIDE) 12.5 MG capsule Take 1 capsule by mouth every morning Yes Elmer Dorantes MD   methotrexate (RHEUMATREX) 2.5 MG chemo tablet Take 15.5 mg by mouth once a week Take 6 tablets daily Yes Historical Provider, MD   pantoprazole (PROTONIX) 40 MG tablet TAKE 1 TABLET DAILY Yes Elmer Dorantes MD   etanercept (ENBREL) 50 MG/ML injection Inject 50 mg into the skin once a week  Yes Historical Provider, MD   Probiotic Product (ALIGN PO) Take by mouth daily Yes Historical Provider, MD   Omega-3 Fatty Acids (FISH OIL PO) Take by mouth Yes Historical Provider, MD   OXYGEN 2 lpm at rest and to sleep and 4 lpm to ambulate  Patient taking differently: 3 lpm at rest and to sleep and 4 lpm to ambulate Yes [facility] Heike Connell MD   therapeutic multivitamin-minerals [facility]) tablet Take 1 tablet by mouth daily. Yes Historical Provider, MD   Calcium Citrate-Vitamin D 200-200 MG-UNIT TABS Take 1 tablet by mouth 2 times daily  Yes Historical Provider, MD   Coenzyme Q10 (COQ10) 100 MG CAPS Take 100 mg by mouth daily. Yes Historical Provider, MD   aspirin 81 MG EC tablet Take 81 mg by mouth daily. Yes Historical Provider, MD   folic acid (FOLVITE) 1 MG tablet Take 1 mg by mouth daily.    Yes Historical Provider, MD   losartan (COZAAR) 50 MG tablet take 1 tablet by mouth once daily  Patient not taking: Reported on 11/4/2021  Shaina Mcgraw MD         Past Medical History:   Diagnosis Date    Acid reflux     Anxiety     Arrhythmia     Arthritis     Breast cancer ([address] 11/1998    left    Bronchiectasis ([address] 09/30/2015    CAD (coronary artery disease)     NON-OBSTRUCTIVE    Cancer ([address]     breast    Diabetes mellitus ([address]     Heart palpitations     HX OF:    History of therapeutic radiation 1998    left breast cancer    Hyperlipidemia     Hypertension     ILD (interstitial lung disease) ([address] 09/30/2015    w/ Bronchectasis    Osteoarthritis     rhuematoid    Rheumatoid arthritis ([address]     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       Past Surgical History:   Procedure Laterality Date    ABDOMINAL HERNIA REPAIR  9/2013    BREAST LUMPECTOMY  12/10/1998    CARDIOVASCULAR STRESS TEST  06/17/2010    EF 71%    CARDIOVASCULAR STRESS TEST  01/28/2008    EF 60%    CARDIOVASCULAR STRESS TEST  01/22/2005    CATARACT REMOVAL Bilateral     Dr Piedad Burkitt - 6/27/17 right, 7/10/17 left    CATARACT REMOVAL      [address]LE[address]  09/20/2008    [address]     COLONOSCOPY      HAMMER TOE SURGERY Left 09/2018    [facility]    HIATAL HERNIA REPAIR  09/15/2009    [facility]    JOINT REPLACEMENT  5/29/02    left knee    TONSILLECTOMY AND ADENOIDECTOMY  1945    TOTAL KNEE ARTHROPLASTY Left 2002    TRANSTHORACIC ECHOCARDIOGRAM  01/28/2005    EF 70%    UPPER GASTROINTESTINAL ENDOSCOPY  03/21/2017         Family History   Problem Relation Age of Onset    Heart Disease Mother     High Cholesterol Mother     Stroke Mother     Diabetes Mother     Heart Disease Father     High Blood Pressure Father     Pacemaker Father     Ovarian Cancer Daughter 40    Breast Cancer Maternal Aunt 39    Breast Cancer Paternal Aunt 53       CareTeam (Including outside providers/suppliers regularly involved in providing care):   Patient Care Team:  Clyde Aguilar MD as PCP - General (Emergency Medicine)  Clyde Aguilar MD as PCP - [facility] Empaneled Provider  Yi Freedman MD as Consulting Physician (Pulmonary Disease)    Wt Readings from Last 3 Encounters:   11/23/21 172 lb 4 oz (78.1 kg)   11/04/21 172 lb (78 kg)   11/02/21 172 lb 6 oz (78.2 kg)     Vitals:    11/23/21 1126   BP: 120/62   Site: Right Upper Arm   Position: Sitting   Cuff Size: Medium Adult   Pulse: 86   Resp: 16   Temp: 96.6 °F (35.9 °C)   TempSrc: Skin   SpO2: 91%   Weight: 172 lb 4 oz (78.1 kg)   Height: 5' 1\" (1.549 m)     Body mass index is 32.55 kg/m². Based upon direct observation of the patient, evaluation of cognition reveals recent and remote memory intact. Patient's complete Health Risk Assessment and screening values have been reviewed and are found in Flowsheets. The following problems were reviewed today and where indicated follow up appointments were made and/or referrals ordered. Positive Risk Factor Screenings with Interventions:            General Health and ACP:  General  In general, how would you say your health is?: Fair  In the past 7 days, have you experienced any of the following?  New or Increased Pain, New or Increased Fatigue, Loneliness, Social Isolation, Stress or Anger?: (!) New or Increased Pain  Do you get the social and emotional support that you need?: Yes  Do you have a Living Will?: Yes  Advance Directives     Power of [address] ACP-Advance Directive ACP-Power of     Not on File Filed on 05/12/21 Filed [address] Risk Interventions:  · has a living will    Health Habits/Nutrition:  Health Habits/Nutrition  Do you exercise for at least 20 minutes 2-3 times per week?: Yes  Have you lost any weight without trying in the past 3 months?: No  Do you eat only one meal per day?: No  Have you seen the dentist within the past year?: Yes  Body mass index: (!) 32.54  Health Habits/Nutrition Interventions:  · Inadequate physical activity:  patient is not ready to increase his/her physical activity level at this time  · Nutritional issues:  educational materials for healthy, well-balanced diet provided  · Dental exam overdue:  patient encouraged to make appointment with his/her dentist      ADL:  ADLs  In the past 7 days, did you need help from others to perform any of the following everyday activities? Eating, dressing, grooming, bathing, toileting, or walking/balance?: None  In the past 7 days, did you need help from others to take care of any of the following?  Laundry, housekeeping, banking/finances, shopping, telephone use, food preparation, transportation, or taking medications?: (!) Housekeeping  ADL Interventions:  · Patient declines any further evaluation/treatment for this issue    Personalized Preventive Plan   Current Health Maintenance Status  Immunization History   Administered Date(s) Administered    COVID-19, [address], Primary or Immunocompromised, PF, 100mcg/0.5mL 01/21/2021, 02/18/2021, 10/28/2021    Influenza A ([id number]) Vaccine PF IM 02/02/2010    Influenza Virus Vaccine 10/05/2013, 10/13/2014, 10/03/2015, 10/08/2018    Influenza, High Dose (Fluzone 65 yrs and older) 10/03/2017, 09/18/2020    Influenza, High-dose, Quadv, 65 yrs +, IM (Fluzone) 09/18/2020    Influenza, Quadv, IM, PF (6 mo and older Fluzone, Flulaval, Fluarix, and 3 yrs and older Afluria) 10/14/2016    Influenza, Quadv, adjuvanted, 65 yrs +, IM, PF (Fluad) 09/28/2021    Influenza, Triv, inactivated, subunit, adjuvanted, IM (Fluad 65 yrs and older) 10/08/2018, 10/07/2019    Influenza, Trivalent Vaccine 6-35 Months, IM, Preservative Free 10/14/2016    PPD Test 10/02/2015, 10/09/2015    Pneumococcal Conjugate 13-valent ([id number]) 11/01/2016    Pneumococcal Polysaccharide ([id number]) 10/03/2015    Tdap (Boostrix, Adacel) 11/01/2016    Zoster Live (Zostavax) 05/17/2010    Zoster Recombinant (Shingrix) 09/18/2020, 12/22/2020        Health Maintenance   Topic Date Due    Annual Wellness Visit (AWV)  07/31/2021    COVID-19 Vaccine (4 - Booster for Moderna series) 04/28/2022    Lipid screen  11/19/2022    Potassium monitoring  11/19/2022    Creatinine monitoring  11/19/2022    DTaP/Tdap/Td vaccine (2 - Td or Tdap) 11/01/2026    Flu vaccine  Completed    Shingles Vaccine  Completed    Pneumococcal 65+ years Vaccine  Completed    Hepatitis A vaccine  Aged Out    Hib vaccine  Aged Out    Meningococcal (ACWY) vaccine  Aged Out     Recommendations for [facility] Due: see orders and patient instructions/AVS.  . Recommended screening schedule for the next 5-10 years is provided to the patient in written form: see Patient Azael Ballard was seen today for check-up, diabetes and medicare awv. Diagnoses and all orders for this visit:    Routine general medical examination at a health care facility    Type 2 diabetes mellitus with other specified complication, unspecified whether long term insulin use (HCC)  -     POCT glycosylated hemoglobin (Hb A1C)  -     POCT Glucose    SI joint arthritis  -     XR LUMBAR SPINE (2-3 VIEWS); Future    Essential hypertension    Pulmonary fibrosis (HCC)    Chronic respiratory failure, unspecified whether with hypoxia or hypercapnia ([facility]                   Date: 11/23/2021    :    Ric Fields is a 80 y. o.female who presents today for:  Chief Complaint   Patient presents with    Check-Up    Diabetes    Medicare AWV         HPI:     Having right low back pain for the past 10 days. No injury nor trauma. No sciatica    Patient is feeling well since not taking the blood pressure pills, patient's been checking it and they were within normal limits. Diabetes  She presents for her follow-up diabetic visit. She has type 2 diabetes mellitus. Her disease course has been stable. Pertinent negatives for hypoglycemia include no dizziness, headaches, nervousness/anxiousness or speech difficulty. Pertinent negatives for diabetes include no chest pain, no fatigue, no foot paresthesias, no visual change and no weakness. Symptoms are stable. An ACE inhibitor/angiotensin II receptor blocker is being taken. Hypertension  Pertinent negatives include no chest pain, headaches, neck pain, palpitations or shortness of breath. Hyperlipidemia  Pertinent negatives include no chest pain, myalgias or shortness of breath. Coronary Artery Disease  Pertinent negatives include no chest pain, chest tightness, dizziness, leg swelling, palpitations or shortness of breath. Risk factors include hyperlipidemia.        CurrentHome Medications:  Current Outpatient Medications   Medication Sig Dispense Refill    atorvastatin (LIPITOR) 40 MG tablet TAKE 1 TABLET EVERY EVENING 90 tablet 3    metoprolol succinate (TOPROL XL) 50 MG extended release tablet TAKE ONE AND ONE-HALF TABLETS DAILY 135 tablet 3    ENBREL SURECLICK 50 MG/ML SOAJ       ipratropium-albuterol (DUONEB) 0.5-2.5 (3) MG/3ML SOLN nebulizer solution USE 1 VIAL IN NEBULIZER EVERY 6 HOURS - for shortness of breath/wheezing 120 vial 11    hydroCHLOROthiazide (MICROZIDE) 12.5 MG capsule Take 1 capsule by mouth every morning 90 capsule 1    methotrexate (RHEUMATREX) 2.5 MG chemo tablet Take 15.5 mg by mouth once a week Take 6 tablets daily      pantoprazole (PROTONIX) 40 MG tablet TAKE 1 TABLET DAILY 90 tablet 3    etanercept (ENBREL) 50 MG/ML injection Inject 50 mg into the skin once a week       Probiotic Product (ALIGN PO) Take by mouth daily      Omega-3 Fatty Acids (FISH OIL PO) Take by mouth      OXYGEN 2 lpm at rest and to sleep and 4 lpm to ambulate (Patient taking differently: 3 lpm at rest and to sleep and 4 lpm to ambulate) 1 Device 6    therapeutic multivitamin-minerals (THERAGRAN-M) tablet Take 1 tablet by mouth daily.  Calcium Citrate-Vitamin D 200-200 MG-UNIT TABS Take 1 tablet by mouth 2 times daily       Coenzyme Q10 (COQ10) 100 MG CAPS Take 100 mg by mouth daily.  aspirin 81 MG EC tablet Take 81 mg by mouth daily.  folic acid (FOLVITE) 1 MG tablet Take 1 mg by mouth daily.  losartan (COZAAR) 50 MG tablet take 1 tablet by mouth once daily (Patient not taking: Reported on 11/4/2021) 90 tablet 1     No current facility-administered medications for this visit. Subjective:      Review of Systems   Constitutional: Negative for appetite change, chills, diaphoresis, fatigue and fever. HENT: Negative for congestion, ear pain, postnasal drip, rhinorrhea, sinus pressure, sneezing, sore throat, trouble swallowing and voice change. Respiratory: Negative for cough, chest tightness, shortness of breath and wheezing. Cardiovascular: Negative for chest pain, palpitations and leg swelling. Gastrointestinal: Negative for abdominal pain, constipation, diarrhea, nausea and vomiting. Musculoskeletal: Positive for arthralgias and back pain. Negative for joint swelling, myalgias, neck pain and neck stiffness. Neurological: Negative for dizziness, syncope, speech difficulty, weakness, light-headedness, numbness and headaches. Psychiatric/Behavioral: The patient is not nervous/anxious.         Objective:     /62 (Site: Right Upper Arm, Position: Sitting, Cuff Size: Medium Adult)   Pulse 86   Temp 96.6 °F (35.9 °C) (Skin)   Resp 16   Ht 5' 1\" Pulmonary fibrosis ([facility]     6. Chronic respiratory failure, unspecified whether with hypoxia or hypercapnia ([facility]         :      Medications Prescribed:  No orders of the defined types were placed in this encounter. Orders Placed:  Orders Placed This Encounter   Procedures    XR LUMBAR SPINE (2-3 VIEWS)     Standing Status:   Future     Standing Expiration Date:   11/23/2022    POCT glycosylated hemoglobin (Hb A1C)    POCT Glucose       Lab Results   Component Value Date    LABA1C 6.3 08/17/2021    LABA1C 6.0 05/11/2021    LABA1C 6.1 11/03/2020     Lab Results   Component Value Date    LDLCALC 64 11/19/2021    CREATININE 1.11 (H) 11/19/2021       Results of Laboratory tests taken  11/19/21 were reviewed with the patient. Results were w/in  acceptable range    Return in 3 months (on 2/23/2022) for DM,  Medicare Annual Wellness Visit in 1 year. Discussed use, benefit, and side effects of prescribedmedications. All patient questions answered. Pt voiced understanding. Instructedto continue current medications, diet and exercise. Patient agreed with treatmentplan.

## 2021-11-23 NOTE — PATIENT INSTRUCTIONS
Personalized Preventive Plan for Sarai Boo - 11/23/2021  Medicare offers a range of preventive health benefits. Some of the tests and screenings are paid in full while other may be subject to a deductible, co-insurance, and/or copay. Some of these benefits include a comprehensive review of your medical history including lifestyle, illnesses that may run in your family, and various assessments and screenings as appropriate. After reviewing your medical record and screening and assessments performed today your provider may have ordered immunizations, labs, imaging, and/or referrals for you. A list of these orders (if applicable) as well as your Preventive Care list are included within your After Visit Summary for your review. Other Preventive Recommendations:    · A preventive eye exam performed by an eye specialist is recommended every 1-2 years to screen for glaucoma; cataracts, macular degeneration, and other eye disorders. · A preventive dental visit is recommended every 6 months. · Try to get at least 150 minutes of exercise per week or 10,000 steps per day on a pedometer . · Order or download the FREE \"Exercise & Physical Activity: Your Everyday Guide\" from [employer]. Call 6-191.409.8545 or search [employer] online. · You need 5709-7820 mg of calcium and 9446-4462 IU of vitamin D per day. It is possible to meet your calcium requirement with diet alone, but a vitamin D supplement is usually necessary to meet this goal.  · When exposed to the sun, use a sunscreen that protects against both UVA and UVB radiation with an SPF of 30 or greater. Reapply every 2 to 3 hours or after sweating, drying off with a towel, or swimming. · Always wear a seat belt when traveling in a car. Always wear a helmet when riding a bicycle or motorcycle.

## 2021-12-02 ENCOUNTER — HOSPITAL ENCOUNTER (OUTPATIENT)
Dept: GENERAL RADIOLOGY | Age: 86
Discharge: HOME | End: 2021-12-02
Payer: MEDICARE

## 2021-12-02 ENCOUNTER — HOSPITAL ENCOUNTER (OUTPATIENT)
Age: 86
Discharge: HOME | End: 2021-12-02
Payer: MEDICARE

## 2021-12-02 DIAGNOSIS — M47.818 SI JOINT ARTHRITIS: ICD-10-CM

## 2021-12-02 DIAGNOSIS — I10 ESSENTIAL HYPERTENSION: ICD-10-CM

## 2021-12-02 DIAGNOSIS — E11.69 TYPE 2 DIABETES MELLITUS WITH OTHER SPECIFIED COMPLICATION, UNSPECIFIED WHETHER LONG TERM INSULIN USE (HCC): ICD-10-CM

## 2021-12-02 DIAGNOSIS — E78.5 HYPERLIPIDEMIA, UNSPECIFIED HYPERLIPIDEMIA TYPE: ICD-10-CM

## 2021-12-02 LAB
ALBUMIN SERPL-MCNC: 4 G/DL (ref 3.5–5.1)
ALP BLD-CCNC: 66 U/L (ref 38–126)
ALT SERPL-CCNC: 19 U/L (ref 11–66)
ANION GAP SERPL CALCULATED.3IONS-SCNC: 13 MEQ/L (ref 8–16)
AST SERPL-CCNC: 27 U/L (ref 5–40)
BASOPHILS # BLD: 0.8 %
BASOPHILS ABSOLUTE: 0 THOU/MM3 (ref 0–0.1)
BILIRUB SERPL-MCNC: 0.5 MG/DL (ref 0.3–1.2)
BUN BLDV-MCNC: 21 MG/DL (ref 7–22)
CALCIUM SERPL-MCNC: 10.2 MG/DL (ref 8.5–10.5)
CHLORIDE BLD-SCNC: 99 MEQ/L (ref 98–111)
CHOLESTEROL, TOTAL: 158 MG/DL (ref 100–199)
CO2: 27 MEQ/L (ref 23–33)
CREAT SERPL-MCNC: 1.1 MG/DL (ref 0.4–1.2)
EOSINOPHIL # BLD: 5 %
EOSINOPHILS ABSOLUTE: 0.3 THOU/MM3 (ref 0–0.4)
ERYTHROCYTE [DISTWIDTH] IN BLOOD BY AUTOMATED COUNT: 14.6 % (ref 11.5–14.5)
ERYTHROCYTE [DISTWIDTH] IN BLOOD BY AUTOMATED COUNT: 51.2 FL (ref 35–45)
GFR SERPL CREATININE-BSD FRML MDRD: 47 ML/MIN/1.73M2
GLUCOSE BLD-MCNC: 182 MG/DL (ref 70–108)
HCT VFR BLD CALC: 37.8 % (ref 37–47)
HDLC SERPL-MCNC: 85 MG/DL
HEMOGLOBIN: 11.8 GM/DL (ref 12–16)
IMMATURE GRANS (ABS): 0.02 THOU/MM3 (ref 0–0.07)
IMMATURE GRANULOCYTES: 0.3 %
LDL CHOLESTEROL CALCULATED: 54 MG/DL
LYMPHOCYTES # BLD: 17.5 %
LYMPHOCYTES ABSOLUTE: 1.1 THOU/MM3 (ref 1–4.8)
MCH RBC QN AUTO: 30.6 PG (ref 26–33)
MCHC RBC AUTO-ENTMCNC: 31.2 GM/DL (ref 32.2–35.5)
MCV RBC AUTO: 98.2 FL (ref 81–99)
MONOCYTES # BLD: 7.8 %
MONOCYTES ABSOLUTE: 0.5 THOU/MM3 (ref 0.4–1.3)
NUCLEATED RED BLOOD CELLS: 0 /100 WBC
PLATELET # BLD: 111 THOU/MM3 (ref 130–400)
PMV BLD AUTO: 9.4 FL (ref 9.4–12.4)
POTASSIUM SERPL-SCNC: 4 MEQ/L (ref 3.5–5.2)
RBC # BLD: 3.85 MILL/MM3 (ref 4.2–5.4)
SEG NEUTROPHILS: 68.6 %
SEGMENTED NEUTROPHILS ABSOLUTE COUNT: 4.1 THOU/MM3 (ref 1.8–7.7)
SODIUM BLD-SCNC: 139 MEQ/L (ref 135–145)
TOTAL PROTEIN: 7.5 G/DL (ref 6.1–8)
TRIGL SERPL-MCNC: 96 MG/DL (ref 0–199)
WBC # BLD: 6 THOU/MM3 (ref 4.8–10.8)

## 2021-12-02 PROCEDURE — 80061 LIPID PANEL: CPT

## 2021-12-02 PROCEDURE — 85025 COMPLETE CBC W/AUTO DIFF WBC: CPT

## 2021-12-02 PROCEDURE — 36415 COLL VENOUS BLD VENIPUNCTURE: CPT

## 2021-12-02 PROCEDURE — 72100 X-RAY EXAM L-S SPINE 2/3 VWS: CPT

## 2021-12-02 PROCEDURE — 80053 COMPREHEN METABOLIC PANEL: CPT

## 2021-12-09 ENCOUNTER — TELEPHONE (OUTPATIENT)
Dept: FAMILY MEDICINE CLINIC | Age: 86
End: 2021-12-09

## 2021-12-09 NOTE — TELEPHONE ENCOUNTER
Please call patient her laboratories are within normal limits, x-ray showed degenerative disc and arthritis compression fracture which is likely old

## 2021-12-13 RX ORDER — PANTOPRAZOLE SODIUM 40 MG/1
TABLET, DELAYED RELEASE ORAL
Qty: 90 TABLET | Refills: 3 | Status: SHIPPED | OUTPATIENT
Start: 2021-12-13

## 2022-02-02 RX ORDER — HYDROCHLOROTHIAZIDE 12.5 MG/1
12.5 CAPSULE, GELATIN COATED ORAL EVERY MORNING
Qty: 30 CAPSULE | Refills: 0 | Status: SHIPPED | OUTPATIENT
Start: 2022-02-02 | End: 2022-02-28

## 2022-02-02 NOTE — TELEPHONE ENCOUNTER
Date of last visit:  11/23/2021  Date of next visit:  3/1/2022      Patient requesting this be sent to the pharmacy as soon as possible due to the weather. She will be out on Saturday.       Requested Prescriptions     Pending Prescriptions Disp Refills    hydroCHLOROthiazide (MICROZIDE) 12.5 MG capsule 90 capsule 1     Sig: Take 1 capsule by mouth every morning

## 2022-02-02 NOTE — TELEPHONE ENCOUNTER
Date of last visit:  11/23/2021  Date of next visit:  3/1/2022    Requested Prescriptions     Pending Prescriptions Disp Refills    hydroCHLOROthiazide (MICROZIDE) 12.5 MG capsule 30 capsule 0     Sig: Take 1 capsule by mouth every morning

## 2022-02-02 NOTE — TELEPHONE ENCOUNTER
Date of last visit:  11/23/2021  Date of next visit:  3/1/2022    Requested Prescriptions     Signed Prescriptions Disp Refills    hydroCHLOROthiazide (MICROZIDE) 12.5 MG capsule 30 capsule 0     Sig: Take 1 capsule by mouth every morning     Authorizing Provider: Brandon Maddox     Ordering User: ANAYELI DERAS

## 2022-02-28 RX ORDER — HYDROCHLOROTHIAZIDE 12.5 MG/1
12.5 CAPSULE, GELATIN COATED ORAL EVERY MORNING
Qty: 90 CAPSULE | Refills: 1 | Status: SHIPPED | OUTPATIENT
Start: 2022-02-28 | End: 2022-08-25

## 2022-02-28 NOTE — TELEPHONE ENCOUNTER
Date of last visit:  11/23/2021  Date of next visit:  3/1/2022    Requested Prescriptions     Pending Prescriptions Disp Refills    hydroCHLOROthiazide (MICROZIDE) 12.5 MG capsule [Pharmacy Med Name: HYDROCHLOROTHIAZIDE 12.5 MG CP] 30 capsule 0     Sig: take 1 capsule by mouth every morning

## 2022-03-01 ENCOUNTER — OFFICE VISIT (OUTPATIENT)
Dept: FAMILY MEDICINE CLINIC | Age: 87
End: 2022-03-01

## 2022-03-01 ENCOUNTER — TELEPHONE (OUTPATIENT)
Dept: FAMILY MEDICINE CLINIC | Age: 87
End: 2022-03-01

## 2022-03-01 VITALS
HEART RATE: 84 BPM | DIASTOLIC BLOOD PRESSURE: 66 MMHG | SYSTOLIC BLOOD PRESSURE: 128 MMHG | BODY MASS INDEX: 31.63 KG/M2 | WEIGHT: 167.5 LBS | TEMPERATURE: 96.8 F | RESPIRATION RATE: 16 BRPM | HEIGHT: 61 IN

## 2022-03-01 DIAGNOSIS — J84.10 PULMONARY FIBROSIS (HCC): ICD-10-CM

## 2022-03-01 DIAGNOSIS — R00.2 PALPITATION: ICD-10-CM

## 2022-03-01 DIAGNOSIS — M05.10 RHEUMATOID LUNG DISEASE WITH RHEUMATOID ARTHRITIS (HCC): ICD-10-CM

## 2022-03-01 DIAGNOSIS — E11.69 TYPE 2 DIABETES MELLITUS WITH OTHER SPECIFIED COMPLICATION, UNSPECIFIED WHETHER LONG TERM INSULIN USE (HCC): Primary | ICD-10-CM

## 2022-03-01 DIAGNOSIS — E78.5 HYPERLIPIDEMIA, UNSPECIFIED HYPERLIPIDEMIA TYPE: ICD-10-CM

## 2022-03-01 DIAGNOSIS — I25.10 CORONARY ARTERY DISEASE INVOLVING NATIVE CORONARY ARTERY OF NATIVE HEART WITHOUT ANGINA PECTORIS: ICD-10-CM

## 2022-03-01 DIAGNOSIS — I10 ESSENTIAL HYPERTENSION: ICD-10-CM

## 2022-03-01 LAB
CHP ED QC CHECK: ABNORMAL
GLUCOSE BLD-MCNC: 169 MG/DL
HBA1C MFR BLD: 6 %

## 2022-03-01 PROCEDURE — 82962 GLUCOSE BLOOD TEST: CPT | Performed by: EMERGENCY MEDICINE

## 2022-03-01 PROCEDURE — 83036 HEMOGLOBIN GLYCOSYLATED A1C: CPT | Performed by: EMERGENCY MEDICINE

## 2022-03-01 PROCEDURE — 99214 OFFICE O/P EST MOD 30 MIN: CPT | Performed by: EMERGENCY MEDICINE

## 2022-03-01 RX ORDER — HYDROCHLOROTHIAZIDE 12.5 MG/1
12.5 CAPSULE, GELATIN COATED ORAL EVERY MORNING
Qty: 90 CAPSULE | Refills: 1 | Status: CANCELLED | OUTPATIENT
Start: 2022-03-01

## 2022-03-01 SDOH — ECONOMIC STABILITY: FOOD INSECURITY: WITHIN THE PAST 12 MONTHS, YOU WORRIED THAT YOUR FOOD WOULD RUN OUT BEFORE YOU GOT MONEY TO BUY MORE.: NEVER TRUE

## 2022-03-01 SDOH — ECONOMIC STABILITY: FOOD INSECURITY: WITHIN THE PAST 12 MONTHS, THE FOOD YOU BOUGHT JUST DIDN'T LAST AND YOU DIDN'T HAVE MONEY TO GET MORE.: NEVER TRUE

## 2022-03-01 ASSESSMENT — ENCOUNTER SYMPTOMS
CHEST TIGHTNESS: 0
NAUSEA: 0
DIARRHEA: 0
SHORTNESS OF BREATH: 0
BACK PAIN: 0
SINUS PRESSURE: 0
WHEEZING: 0
VOMITING: 0
ABDOMINAL PAIN: 0
COUGH: 0
SORE THROAT: 0
VISUAL CHANGE: 0
RHINORRHEA: 0
TROUBLE SWALLOWING: 0
VOICE CHANGE: 0
CONSTIPATION: 0

## 2022-03-01 ASSESSMENT — SOCIAL DETERMINANTS OF HEALTH (SDOH): HOW HARD IS IT FOR YOU TO PAY FOR THE VERY BASICS LIKE FOOD, HOUSING, MEDICAL CARE, AND HEATING?: NOT HARD AT ALL

## 2022-03-01 NOTE — PROGRESS NOTES
Pt is scheduled for a 48 hour Holter monitor on Wed. March 23, 2022 at 10:30 am and will need to arrive at 10:00 am. No prep is needed.  Pt informed

## 2022-03-01 NOTE — LETTER
[place] [address]  Phone: [phone]  Fax: [phone]    Mlai Tapia MD         March 1, 2022     Patient: Luz Ramesh   YOB: 1935   Date of Visit: 3/1/2022       To Whom It May Concern: It is my medical opinion that Eyal Robbins requires a disability parking placard for the following reasons:  She is restricted by lung disease to the extent that forced expiratory volume for one second when measured by spirometry is less than one liter and arterial oxygen tension is less than 60 mm/hg on room air at rest.  Duration of need: 5 years    If you have any questions or concerns, please don't hesitate to call.     Sincerely,        Mali Tapia MD

## 2022-03-01 NOTE — PROGRESS NOTES
Date: 3/1/2022    : Dinorah Ribeiro is a 80 y. o.female who presents today for:  Chief Complaint   Patient presents with    Diabetes         HPI:     Last week had palpitation , and had diaphoresis and again recently. Denies any chest pain no nausea vomiting, no worsening shortness of breath    Shoulder pain flaring up this morning while laying down    Patient wants also renew one of her disability placard    Diabetes  She presents for her follow-up diabetic visit. She has type 2 diabetes mellitus. Her disease course has been stable. Pertinent negatives for hypoglycemia include no dizziness, headaches, nervousness/anxiousness or speech difficulty. Pertinent negatives for diabetes include no chest pain, no fatigue, no foot paresthesias, no visual change and no weakness. Symptoms are stable. An ACE inhibitor/angiotensin II receptor blocker is being taken. Hypertension  Associated symptoms include palpitations. Pertinent negatives include no chest pain, headaches, neck pain or shortness of breath. Hyperlipidemia  Pertinent negatives include no chest pain, myalgias or shortness of breath. Coronary Artery Disease  Symptoms include palpitations. Pertinent negatives include no chest pain, chest tightness, dizziness, leg swelling or shortness of breath. Risk factors include hyperlipidemia.        CurrentHome Medications:  Current Outpatient Medications   Medication Sig Dispense Refill    hydroCHLOROthiazide (MICROZIDE) 12.5 MG capsule take 1 capsule by mouth every morning 90 capsule 1    pantoprazole (PROTONIX) 40 MG tablet TAKE 1 TABLET DAILY 90 tablet 3    atorvastatin (LIPITOR) 40 MG tablet TAKE 1 TABLET EVERY EVENING 90 tablet 3    metoprolol succinate (TOPROL XL) 50 MG extended release tablet TAKE ONE AND ONE-HALF TABLETS DAILY 135 tablet 3    ENBREL SURECLICK 50 MG/ML SOAJ       ipratropium-albuterol (DUONEB) 0.5-2.5 (3) MG/3ML SOLN nebulizer solution USE 1 VIAL IN NEBULIZER EVERY 6 HOURS - for shortness of breath/wheezing 120 vial 11    methotrexate (RHEUMATREX) 2.5 MG chemo tablet Take 15.5 mg by mouth once a week Take 6 tablets daily      etanercept (ENBREL) 50 MG/ML injection Inject 50 mg into the skin once a week       Probiotic Product (ALIGN PO) Take by mouth daily      Omega-3 Fatty Acids (FISH OIL PO) Take by mouth      OXYGEN 2 lpm at rest and to sleep and 4 lpm to ambulate (Patient taking differently: 3 lpm at rest and to sleep and 4 lpm to ambulate) 1 Device 6    therapeutic multivitamin-minerals (THERAGRAN-M) tablet Take 1 tablet by mouth daily.  Calcium Citrate-Vitamin D 200-200 MG-UNIT TABS Take 1 tablet by mouth 2 times daily       Coenzyme Q10 (COQ10) 100 MG CAPS Take 100 mg by mouth daily.  aspirin 81 MG EC tablet Take 81 mg by mouth daily.  folic acid (FOLVITE) 1 MG tablet Take 1 mg by mouth daily. No current facility-administered medications for this visit. Subjective:      Review of Systems   Constitutional: Negative for appetite change, chills, diaphoresis, fatigue and fever. HENT: Negative for congestion, ear pain, postnasal drip, rhinorrhea, sinus pressure, sneezing, sore throat, trouble swallowing and voice change. Respiratory: Negative for cough, chest tightness, shortness of breath and wheezing. Cardiovascular: Positive for palpitations. Negative for chest pain and leg swelling. Gastrointestinal: Negative for abdominal pain, constipation, diarrhea, nausea and vomiting. Musculoskeletal: Negative for arthralgias, back pain, joint swelling, myalgias, neck pain and neck stiffness. Neurological: Negative for dizziness, syncope, speech difficulty, weakness, light-headedness, numbness and headaches. Psychiatric/Behavioral: The patient is not nervous/anxious.         Objective:     /66 (Site: Right Upper Arm, Position: Sitting, Cuff Size: Medium Adult)   Pulse 84   Temp 96.8 °F (36 °C) (Skin)   Resp 16   Ht 5' 1\" (1.549 m)   Wt 167 lb 8 oz (76 kg)   BMI 31.65 kg/m²   BP Readings from Last 3 Encounters:   03/01/22 128/66   11/23/21 120/62   11/04/21 122/60     Wt Readings from Last 3 Encounters:   03/01/22 167 lb 8 oz (76 kg)   11/23/21 172 lb 4 oz (78.1 kg)   11/04/21 172 lb (78 kg)       Physical Exam  Vitals reviewed. Constitutional:       Appearance: She is well-developed. HENT:      Head: Normocephalic and atraumatic. Right Ear: External ear normal.      Left Ear: External ear normal.      Nose: Nose normal.   Eyes:      General: No scleral icterus. Conjunctiva/sclera: Conjunctivae normal.      Pupils: Pupils are equal, round, and reactive to light. Neck:      Thyroid: No thyromegaly. Vascular: No JVD. Cardiovascular:      Rate and Rhythm: Normal rate and regular rhythm. Heart sounds: No murmur heard. No friction rub. Pulmonary:      Effort: Pulmonary effort is normal.      Breath sounds: Rales present. No wheezing. Chest:      Chest wall: No tenderness. Abdominal:      General: Bowel sounds are normal.      Palpations: Abdomen is soft. There is no mass. Tenderness: There is no abdominal tenderness. Musculoskeletal:      Cervical back: Normal range of motion and neck supple. Lymphadenopathy:      Cervical: No cervical adenopathy. Skin:     Findings: No rash. Neurological:      Mental Status: She is alert and oriented to person, place, and time. Psychiatric:         Behavior: Behavior is cooperative. Assessment:         Diagnosis Orders   1. Type 2 diabetes mellitus with other specified complication, unspecified whether long term insulin use (HCC)  POCT Glucose    POCT glycosylated hemoglobin (Hb A1C)   2. Palpitation  Holter Monitor 48 Hour   3. 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 ([address]     4. Pulmonary fibrosis ([address]     5. Essential hypertension     6. Coronary artery disease involving native coronary artery of native heart without angina pectoris     7. Hyperlipidemia, unspecified hyperlipidemia type         :      Medications Prescribed:  No orders of the defined types were placed in this encounter. Orders Placed:  Orders Placed This Encounter   Procedures    Holter Monitor 48 Hour     Standing Status:   Future     Standing Expiration Date:   3/1/2023     Order Specific Question:   Reason for Exam?     Answer:   Palpitations    POCT Glucose    POCT glycosylated hemoglobin (Hb A1C)       Lab Results   Component Value Date    LABA1C 6.0 (A) 03/01/2022    LABA1C 5.9 11/23/2021    LABA1C 6.3 08/17/2021     Lab Results   Component Value Date    LDLCALC 54 12/02/2021    CREATININE 1.1 12/02/2021       Continue blood sugar check 1  times a day. Results of Laboratory tests taken  12/2/21 were reviewed with the patient. Results were w/in  acceptable range    Return in about 3 months (around 6/1/2022) for HTN DM. Discussed use, benefit, and side effects of prescribedmedications. All patient questions answered. Pt voiced understanding. Instructedto continue current medications, diet and exercise. Patient agreed with treatmentplan.

## 2022-03-23 ENCOUNTER — HOSPITAL ENCOUNTER (OUTPATIENT)
Dept: NON INVASIVE DIAGNOSTICS | Age: 87
Discharge: HOME | End: 2022-03-23
Payer: COMMERCIAL

## 2022-03-23 DIAGNOSIS — R00.2 PALPITATION: ICD-10-CM

## 2022-03-23 PROCEDURE — 93226 XTRNL ECG REC<48 HR SCAN A/R: CPT

## 2022-03-23 PROCEDURE — 93225 XTRNL ECG REC<48 HRS REC: CPT

## 2022-03-23 NOTE — PROCEDURES
48 hour Holter Monitor was applied to patient. Patient was instructed to remove monitor on 03/25/2022 at [phone] and mail back. The serial number on the Holter Monitor is [id number].

## 2022-03-28 ENCOUNTER — OFFICE VISIT (OUTPATIENT)
Dept: PULMONOLOGY | Age: 87
End: 2022-03-28
Payer: COMMERCIAL

## 2022-03-28 VITALS
HEART RATE: 86 BPM | SYSTOLIC BLOOD PRESSURE: 116 MMHG | BODY MASS INDEX: 31.53 KG/M2 | HEIGHT: 61 IN | DIASTOLIC BLOOD PRESSURE: 70 MMHG | OXYGEN SATURATION: 90 % | WEIGHT: 167 LBS | TEMPERATURE: 98.2 F

## 2022-03-28 DIAGNOSIS — J96.11 CHRONIC RESPIRATORY FAILURE WITH HYPOXIA (HCC): ICD-10-CM

## 2022-03-28 DIAGNOSIS — J47.9 BRONCHIECTASIS WITHOUT COMPLICATION (HCC): ICD-10-CM

## 2022-03-28 DIAGNOSIS — R06.02 INCREASING SHORTNESS OF BREATH: Primary | ICD-10-CM

## 2022-03-28 DIAGNOSIS — M05.10 RHEUMATOID LUNG (HCC): ICD-10-CM

## 2022-03-28 PROCEDURE — 99214 OFFICE O/P EST MOD 30 MIN: CPT | Performed by: NURSE PRACTITIONER

## 2022-03-28 RX ORDER — IPRATROPIUM BROMIDE AND ALBUTEROL SULFATE 2.5; .5 MG/3ML; MG/3ML
1 SOLUTION RESPIRATORY (INHALATION) 3 TIMES DAILY
Qty: 120 EACH | Refills: 8 | Status: SHIPPED | OUTPATIENT
Start: 2022-03-28

## 2022-03-28 ASSESSMENT — ENCOUNTER SYMPTOMS
COUGH: 1
VOMITING: 0
ABDOMINAL PAIN: 0
EYES NEGATIVE: 1
CHEST TIGHTNESS: 0
SHORTNESS OF BREATH: 1
WHEEZING: 0
NAUSEA: 0
DIARRHEA: 0

## 2022-03-28 NOTE — PROGRESS NOTES
[facility]   Patient Name: Richy Dolan   Procedure Date: 8/24/2018 8:16 AM   MRN: [id number]   Account Number: [id number]   YOB: 1940   Admit Type: Outpatient   Age: 77   Gender: Male   Note Status: Finalized   Attending MD: Gene Clark MD   Procedure:                    Upper GI endoscopy   Indications:                  Iron deficiency anemia   Providers:                    Gene Clark MD   Referring MD:                 Mandakini K. Pokharna, Md   Sedation:                     Midazolam 4 mg IV, Fentanyl 100 micrograms IV,                                 Cetacaine spray   Procedure:        Pre-Anesthesia Assessment:        - Prior to the procedure, a History and Physical was performed, and        patient medications and allergies were reviewed. The patient is        competent. The risks and benefits of the procedure and the sedation        options and risks were discussed with the patient. All questions were        answered and informed consent was obtained. Patient identification and        proposed procedure were verified by the physician in the pre-procedure        area. Mental Status Examination: alert and oriented. Airway Examination:        normal oropharyngeal airway and neck mobility. Respiratory Examination:        clear to auscultation. CV Examination: normal. Prophylactic Antibiotics:        The patient does not require prophylactic antibiotics. Prior        Anticoagulants: The patient has taken no previous anticoagulant or        antiplatelet agents. ASA Grade Assessment: II - A patient with mild        systemic disease. After reviewing the risks and benefits, the patient        was deemed in satisfactory condition to undergo the procedure. The        anesthesia plan was to use moderate sedation / analgesia (conscious        sedation). Immediately prior to administration of medications, the        patient was re-assessed for adequacy to receive sedatives. The heart         [facility]     Patient: Abhijeet Amin, 80 y.o.   : 1935  3/28/2022    Pt of Dr. Wood Odom   Patient presents with    Follow-up     1 year Rheumatoid lung follow up, no testing         HPI  Aristides Monroe is here for 1 year follow up for ILD/ Rheumatoid lung with no testing   Follows with Dr Jeff Rosen MD for RA , on methotrexate and Enbrel   Does c/o increased SOB - feels secondary to inactivity   CT chest 2021- stable non specific fibrotic changes w/ bronchiectasis most pronounced in LLL     Current Inhalers:  Duoneb -using 3x per day , feels benefit with use   Has acaepella device - using 3x per day   Has productive cough - clear to whitish color mucous   Stable amount of mucous and coughing   Denies wheezing   Just completed holter monitor for palpitations, has not received results     Any recent exacerbations that have required oral atb or steroids No    Last spirometry: in office spirometry 3/2/21   slight change in FeV 1 compared to 2019, pretty stable. FVC reduced,  Fev 1 reduced- likely restrictive component, FEV 1/FVC ratio shows mild change in actual vs. % pred, therefore there is likely some mild obstructive component as well however FEF 25-75% is normal   Electronically signed by PRISCILLA Brown CNP on 3/2/2021    Patient is on home oxygen therapy: using 3-4LPM O2 w/ activity   Current Oxygen order: 3LPM continuous at rest and sleep ,.  4LPM with activity   Uses POC when away from home, on concentrator w/ continuous flow at home   Last 6 min walk:2016  is  using O2 compliantly     UTD with flu vaccine Yes  UTD with pneumonia vaccine Yes  Personal history of COVID 19 No   Internal Administration   First Dose COVID-19, Moderna, Primary or Immunocompromised, PF, 100mcg/0.5mL  2021   Second Dose COVID-19, Moderna, Primary or Immunocompromised, PF, 100mcg/0.5mL   2021       Last COVID Lab No results found for: SARS-COV-2, SARS-COV-2 RNA, SARS-COV-2, SARS-COV-2, SARS-COV-2 BY PCR, SARS-COV-2, SARS-COV-2, SARS-COV-2            SOCIAL HISTORY:  Social History     Tobacco Use    Smoking status: Never Smoker    Smokeless tobacco: Never Used   Vaping Use    Vaping Use: Never used   Substance Use Topics    Alcohol use: Yes     Alcohol/week: 0.0 standard drinks     Comment: RARE    Drug use: No         CURRENT MEDICATIONS:  Current Outpatient Medications   Medication Sig Dispense Refill    ipratropium-albuterol (DUONEB) 0.5-2.5 (3) MG/3ML SOLN nebulizer solution Inhale 3 mLs into the lungs 3 times daily USE 1 VIAL IN NEBULIZER EVERY 6 HOURS - for shortness of breath/wheezing 120 each 8    hydroCHLOROthiazide (MICROZIDE) 12.5 MG capsule take 1 capsule by mouth every morning 90 capsule 1    pantoprazole (PROTONIX) 40 MG tablet TAKE 1 TABLET DAILY 90 tablet 3    atorvastatin (LIPITOR) 40 MG tablet TAKE 1 TABLET EVERY EVENING 90 tablet 3    ENBREL SURECLICK 50 MG/ML SOAJ       methotrexate (RHEUMATREX) 2.5 MG chemo tablet Take 15.5 mg by mouth once a week Take 6 tablets daily      etanercept (ENBREL) 50 MG/ML injection Inject 50 mg into the skin once a week       Probiotic Product (ALIGN PO) Take by mouth daily      Omega-3 Fatty Acids (FISH OIL PO) Take by mouth      OXYGEN 2 lpm at rest and to sleep and 4 lpm to ambulate (Patient taking differently: 3 lpm at rest and to sleep and 4 lpm to ambulate) 1 Device 6    therapeutic multivitamin-minerals (THERAGRAN-M) tablet Take 1 tablet by mouth daily.  Calcium Citrate-Vitamin D 200-200 MG-UNIT TABS Take 1 tablet by mouth 2 times daily       Coenzyme Q10 (COQ10) 100 MG CAPS Take 100 mg by mouth daily.  aspirin 81 MG EC tablet Take 81 mg by mouth daily.  folic acid (FOLVITE) 1 MG tablet Take 1 mg by mouth daily. No current facility-administered medications for this visit.        ROS   Review of Systems   Constitutional: Negative for activity rate, respiratory rate, oxygen saturations, blood pressure, adequacy of        pulmonary ventilation, and response to care were monitored throughout        the procedure. The physical status of the patient was re-assessed after        the procedure.        A History and Physical was performed prior to the procedure. Patient        medications and allergies were reviewed. The risks and benefits of the        procedure and sedation options were discussed. Questions were answered        and informed consent was obtained. Patient identification and proposed        procedure were verified. The patient was deemed in satisfactory        condition to undergo the procedure. The heart rate, respiratory rate,        oxygen saturations, blood pressure and response to sedation were        monitored throughout the procedure.        The endoscope was passed under direct vision. The Endoscope was        introduced through the mouth, and advanced to the third part of        duodenum. . The physical status of the patient was re-assessed after the        procedure. The upper GI endoscopy was accomplished without difficulty.        The patient tolerated the procedure well.   Findings:        The examined duodenum was normal. Biopsies for histology were taken with        a cold forceps for evaluation of celiac disease.        Diffuse mildly erythematous mucosa without bleeding was found in the        entire examined stomach. Biopsies were taken with a cold forceps for        histology.        The examined esophagus was normal.   Impression:        - Normal examined duodenum. Biopsied.        - Erythematous mucosa in the stomach. Biopsied.        - Normal esophagus.   Moderate Sedation:        10 minutes of conscious sedation, monitored by myself, beginning at 8:35        and ending at 8:45 was utilized this procedure.        Moderate (conscious) sedation was administered by the endoscopy nurse        and supervised by the endoscopist.  The following parameters were        monitored: oxygen saturation, heart rate, blood pressure, respiratory        rate, EKG, adequacy of pulmonary ventilation, and response to care.        Total physician intraservice time was 10 minutes.   Recommendation:        - Await pathology results.        - Continue present medications.        - Discharge patient to home (ambulatory).        - Return to primary care physician as previously scheduled.   Complications:        No immediate complications.   MD Gene Montiel MD   8/24/2018 8:45:38 AM   This report has been signed electronically by the above.   Number of Addenda: 0   Procedure Code(s):            --- Professional ---                                 35772, Esophagogastroduodenoscopy, flexible,                                 transoral; with biopsy, single or multiple                                 92847, 59, Moderate sedation services provided                                 by the same physician or other qualified health                                 care professional performing the diagnostic or                                 therapeutic service that the sedation supports,                                 requiring the presence of an independent                                 trained observer to assist in the monitoring of                                 the patient's level of consciousness and                                 physiological status; initial 15 minutes of                                 intraservice time, patient age 5 years or older   Diagnosis Code(s):            --- Professional ---                                 K31.89, Other diseases of stomach and duodenum                                 D50.9, Iron deficiency anemia, unspecified   CPT copyright 2016 [employer]. All rights reserved.   The codes documented in this report are preliminary and upon  review may   be revised to meet current compliance  change, appetite change, chills, fatigue, fever and unexpected weight change. HENT: Negative. Eyes: Negative. Respiratory: Positive for cough and shortness of breath. Negative for chest tightness and wheezing. Cardiovascular: Positive for palpitations. Negative for chest pain and leg swelling. Gastrointestinal: Negative for abdominal pain, diarrhea, nausea and vomiting. Genitourinary: Negative. Musculoskeletal: Positive for arthralgias. Skin: Negative. Neurological: Negative. Hematological: Negative. Psychiatric/Behavioral: Positive for sleep disturbance (trouble falling back asleep ). Physical exam   /70   Pulse 86   Temp 98.2 °F (36.8 °C)   Ht 5' 1\" (1.549 m)   Wt 167 lb (75.8 kg)   SpO2 90% Comment: 3 liters  BMI 31.55 kg/m²       Wt Readings from Last 3 Encounters:   03/28/22 167 lb (75.8 kg)   03/01/22 167 lb 8 oz (76 kg)   11/23/21 172 lb 4 oz (78.1 kg)     Physical Exam  Vitals and nursing note reviewed. Constitutional:       General: She is not in acute distress. Appearance: She is well-developed. She is obese. Interventions: Nasal cannula in place. Comments: Ambulates with walker    HENT:      Mouth/Throat:      Lips: Pink. Mouth: Mucous membranes are moist.      Pharynx: Oropharynx is clear. No oropharyngeal exudate or posterior oropharyngeal erythema. Eyes:      Conjunctiva/sclera: Conjunctivae normal.   Neck:      Vascular: No JVD. Cardiovascular:      Rate and Rhythm: Normal rate and regular rhythm. Heart sounds: No murmur heard. No friction rub. Pulmonary:      Effort: Pulmonary effort is normal. No accessory muscle usage or respiratory distress. Breath sounds: Normal breath sounds. No wheezing, rhonchi or rales. Chest:      Chest wall: No tenderness. Musculoskeletal:      Right hand: Deformity present. No swelling or tenderness. Left hand: Deformity present. No swelling or tenderness.       Right lower leg: No edema. Left lower leg: No edema. Skin:     General: Skin is warm and dry. Capillary Refill: Capillary refill takes less than 2 seconds. Nails: There is no clubbing. Neurological:      Mental Status: She is alert and oriented to person, place, and time. Psychiatric:         Mood and Affect: Mood normal.         Behavior: Behavior normal.         Thought Content: Thought content normal.         Judgment: Judgment normal.          Test results   Lung Nodule Screening     [] Qualifies    [x]Does not qualify   [] Declined    [] Completed     No imaging in past 12 months   Assessment      Diagnosis Orders   1. Increasing shortness of breath  CT CHEST HIGH RESOLUTION    Full PFT Study Without Bronchdilator   2. Rheumatoid lung (HCC)  ipratropium-albuterol (DUONEB) 0.5-2.5 (3) MG/3ML SOLN nebulizer solution    CT CHEST HIGH RESOLUTION    Full PFT Study Without Bronchdilator   3. Chronic respiratory failure with hypoxia (HCC)     4. Bronchiectasis without complication ([facility]       Plan    -schedule for HRCT and Full PFT due to increased dyspnea   -continue Duoneb 3x per day - refills escribed  -continue daily use of Acapella to help mobilize and clear secretions  -is following with cardiology for recent c/o palpitations  -keep reg.  Check ups with rheumatology   -continue oxygen at 3LPM w/ rest/ sleep, 4LPM with activity - is benefiting from use and is keeping SPO2 > 88%   -avoid ill contacts  -keep UTD on recommended vaccinations    Will see Deane Severance in: 1 month  BILLING BASED ON MEDICAL DECISION MAKING   Electronically signed by PRISCILLA Nagy CNP on 3/28/2022 at 10:21 AM requirements.   Scope In:   Scope Out: 8:42:25 AM        [facility] [address] [address] [phone]

## 2022-03-31 LAB
ACQUISITION DURATION: NORMAL S
AVERAGE HEART RATE: 72 BPM
HOOKUP DATE: NORMAL
HOOKUP TIME: NORMAL
MAX HEART RATE TIME/DATE: NORMAL
MAX HEART RATE: 100 BPM
MIN HEART RATE TIME/DATE: NORMAL
MIN HEART RATE: 54 BPM
NUMBER OF QRS COMPLEXES: NORMAL
NUMBER OF SUPRAVENTRICULAR COUPLETS: 1
NUMBER OF SUPRAVENTRICULAR ECTOPICS: 925
NUMBER OF SUPRAVENTRICULAR ISOLATED BEATS: 908
NUMBER OF VENTRICULAR BIGEMINAL CYCLES: 0
NUMBER OF VENTRICULAR COUPLETS: 0
NUMBER OF VENTRICULAR ECTOPICS: 400

## 2022-04-04 ENCOUNTER — TELEPHONE (OUTPATIENT)
Dept: FAMILY MEDICINE CLINIC | Age: 87
End: 2022-04-04

## 2022-04-04 NOTE — TELEPHONE ENCOUNTER
I called the patient regarding her Holter monitor, it is a Normal sinus rhythm  occasional PVCs total 400   occasional PACs total 908  No other form of arrhythmia noted.     Advised the patient that she does not need any other treatment at present not unless she go into different form of arrhythmia like atrial flutter or atrial fibrillation or V. tach

## 2022-05-11 ENCOUNTER — HOSPITAL ENCOUNTER (OUTPATIENT)
Dept: PULMONOLOGY | Age: 87
Discharge: HOME | End: 2022-05-11
Payer: MEDICARE

## 2022-05-11 ENCOUNTER — HOSPITAL ENCOUNTER (OUTPATIENT)
Dept: CT IMAGING | Age: 87
Discharge: HOME | End: 2022-05-11
Payer: MEDICARE

## 2022-05-11 DIAGNOSIS — M05.10 RHEUMATOID LUNG (HCC): ICD-10-CM

## 2022-05-11 DIAGNOSIS — R06.02 INCREASING SHORTNESS OF BREATH: ICD-10-CM

## 2022-05-11 PROCEDURE — 94726 PLETHYSMOGRAPHY LUNG VOLUMES: CPT

## 2022-05-11 PROCEDURE — 94010 BREATHING CAPACITY TEST: CPT

## 2022-05-11 PROCEDURE — 71250 CT THORAX DX C-: CPT

## 2022-05-11 PROCEDURE — 94729 DIFFUSING CAPACITY: CPT

## 2022-05-24 ENCOUNTER — OFFICE VISIT (OUTPATIENT)
Dept: PULMONOLOGY | Age: 87
End: 2022-05-24
Payer: MEDICARE

## 2022-05-24 VITALS
HEIGHT: 61 IN | DIASTOLIC BLOOD PRESSURE: 62 MMHG | OXYGEN SATURATION: 91 % | TEMPERATURE: 97.8 F | WEIGHT: 166 LBS | SYSTOLIC BLOOD PRESSURE: 112 MMHG | HEART RATE: 86 BPM | BODY MASS INDEX: 31.34 KG/M2

## 2022-05-24 DIAGNOSIS — J47.9 BRONCHIECTASIS WITHOUT COMPLICATION (HCC): ICD-10-CM

## 2022-05-24 DIAGNOSIS — J96.11 CHRONIC RESPIRATORY FAILURE WITH HYPOXIA (HCC): ICD-10-CM

## 2022-05-24 DIAGNOSIS — M05.10 RHEUMATOID LUNG (HCC): Primary | ICD-10-CM

## 2022-05-24 DIAGNOSIS — J84.10 PULMONARY FIBROSIS (HCC): ICD-10-CM

## 2022-05-24 PROCEDURE — 99214 OFFICE O/P EST MOD 30 MIN: CPT | Performed by: NURSE PRACTITIONER

## 2022-05-24 PROCEDURE — 1123F ACP DISCUSS/DSCN MKR DOCD: CPT | Performed by: NURSE PRACTITIONER

## 2022-05-24 ASSESSMENT — ENCOUNTER SYMPTOMS
DIARRHEA: 0
VOMITING: 0
NAUSEA: 0
CHEST TIGHTNESS: 0
COUGH: 0
EYES NEGATIVE: 1
WHEEZING: 0
SHORTNESS OF BREATH: 1
ABDOMINAL PAIN: 0

## 2022-05-24 NOTE — PROGRESS NOTES
Constitutional:       General: She is not in acute distress. Appearance: She is well-developed. She is obese. She is not ill-appearing. Interventions: Nasal cannula in place. HENT:      Mouth/Throat:      Lips: Pink. Mouth: Mucous membranes are moist.      Pharynx: Oropharynx is clear. No oropharyngeal exudate or posterior oropharyngeal erythema. Eyes:      Conjunctiva/sclera: Conjunctivae normal.   Neck:      Vascular: No JVD. Cardiovascular:      Rate and Rhythm: Normal rate and regular rhythm. Heart sounds: No murmur heard. No friction rub. Pulmonary:      Effort: Pulmonary effort is normal. No accessory muscle usage or respiratory distress. Breath sounds: Normal breath sounds. No wheezing, rhonchi or rales. Chest:      Chest wall: No tenderness. Musculoskeletal:      Right lower leg: No edema. Left lower leg: No edema. Skin:     General: Skin is warm and dry. Capillary Refill: Capillary refill takes less than 2 seconds. Nails: There is no clubbing. Neurological:      Mental Status: She is alert and oriented to person, place, and time. Psychiatric:         Mood and Affect: Mood normal.         Behavior: Behavior normal.         Thought Content:  Thought content normal.         Judgment: Judgment normal.          Test results   Lung Nodule Screening     [] Qualifies    [x]Does not qualify   [] Declined    [] Completed    Full PFT 5/11/2022     PFT is showing decline from 2019  FVC from 56 to 51  FEV 1 from 67 to 62  FEV 1 / FVC stable  FEF 25-75% normal     SVC from 55 to 49  TLC from 55 to 467  ERV stable   Diffusion from 43 to 35          HRCT chest 5/11/2022     Impression   Pulmonary fibrosis which is nonspecific is again noted which does appear to be slightly progressed at the left lung base since the prior exam. No other changes are seen.               **This report has been created using voice recognition software.  It may contain minor errors which are inherent in voice recognition technology. **       Final report electronically signed by Dr. Kristie Marquez on 5/11/2022 5:17 PM           Assessment      Diagnosis Orders   1. Rheumatoid lung ([address]     2. Chronic respiratory failure with hypoxia (HCC)     3. Bronchiectasis without complication (HCC)     4. Pulmonary fibrosis (HCC)       Increase in fibrosis RML and LLL compared to 6 months prior   Plan    -despite worsening CT and PFT , pt feels good. No current SOB on 3LPM supplemental O2, will forward results to Dr Liudmila Sevilla MD ( rheumatology ) , ? If due to methotrexate.  Recommend stopping due to possible toxicity and considering other alternative for RA tx.     -continue Duoneb 3x per day -has been getting benefit with use   -avoid ill contacts  -keep UTD on recommended vaccinations    Will see Lazaro Triana in: 6 months no testing   billing based on time: total time on encounter 31 min   Electronically signed by PRISCILLA Santana CNP on 5/24/2022 at 4:24 PM

## 2022-06-06 ENCOUNTER — HOSPITAL ENCOUNTER (OUTPATIENT)
Dept: WOMENS IMAGING | Age: 87
Discharge: HOME | End: 2022-06-06
Payer: MEDICARE

## 2022-06-06 DIAGNOSIS — M05.10 RHEUMATOID LUNG (HCC): ICD-10-CM

## 2022-06-06 DIAGNOSIS — Z12.31 VISIT FOR SCREENING MAMMOGRAM: ICD-10-CM

## 2022-06-06 PROCEDURE — 77063 BREAST TOMOSYNTHESIS BI: CPT

## 2022-06-06 RX ORDER — IPRATROPIUM BROMIDE AND ALBUTEROL SULFATE 2.5; .5 MG/3ML; MG/3ML
SOLUTION RESPIRATORY (INHALATION)
Refills: 11 | OUTPATIENT
Start: 2022-06-06

## 2022-06-07 ENCOUNTER — OFFICE VISIT (OUTPATIENT)
Dept: FAMILY MEDICINE CLINIC | Age: 87
End: 2022-06-07

## 2022-06-07 VITALS
WEIGHT: 167 LBS | HEIGHT: 61 IN | HEART RATE: 88 BPM | BODY MASS INDEX: 31.53 KG/M2 | DIASTOLIC BLOOD PRESSURE: 68 MMHG | SYSTOLIC BLOOD PRESSURE: 132 MMHG | RESPIRATION RATE: 14 BRPM

## 2022-06-07 DIAGNOSIS — E66.9 OBESITY (BMI 30-39.9): ICD-10-CM

## 2022-06-07 DIAGNOSIS — J96.10 CHRONIC RESPIRATORY FAILURE, UNSPECIFIED WHETHER WITH HYPOXIA OR HYPERCAPNIA (HCC): ICD-10-CM

## 2022-06-07 DIAGNOSIS — J84.10 PULMONARY FIBROSIS (HCC): ICD-10-CM

## 2022-06-07 DIAGNOSIS — E11.69 TYPE 2 DIABETES MELLITUS WITH OTHER SPECIFIED COMPLICATION, UNSPECIFIED WHETHER LONG TERM INSULIN USE (HCC): Primary | ICD-10-CM

## 2022-06-07 DIAGNOSIS — I10 ESSENTIAL HYPERTENSION: ICD-10-CM

## 2022-06-07 DIAGNOSIS — D84.9 IMMUNOCOMPROMISED (HCC): ICD-10-CM

## 2022-06-07 DIAGNOSIS — M05.10 RHEUMATOID LUNG DISEASE WITH RHEUMATOID ARTHRITIS (HCC): ICD-10-CM

## 2022-06-07 LAB
CHP ED QC CHECK: ABNORMAL
GLUCOSE BLD-MCNC: 165 MG/DL
HBA1C MFR BLD: 6 %

## 2022-06-07 PROCEDURE — 82962 GLUCOSE BLOOD TEST: CPT | Performed by: EMERGENCY MEDICINE

## 2022-06-07 PROCEDURE — 1123F ACP DISCUSS/DSCN MKR DOCD: CPT | Performed by: EMERGENCY MEDICINE

## 2022-06-07 PROCEDURE — 83036 HEMOGLOBIN GLYCOSYLATED A1C: CPT | Performed by: EMERGENCY MEDICINE

## 2022-06-07 PROCEDURE — 99213 OFFICE O/P EST LOW 20 MIN: CPT | Performed by: EMERGENCY MEDICINE

## 2022-06-07 RX ORDER — METOPROLOL SUCCINATE 50 MG/1
75 TABLET, EXTENDED RELEASE ORAL DAILY
COMMUNITY
Start: 2022-04-17

## 2022-06-07 ASSESSMENT — ENCOUNTER SYMPTOMS
WHEEZING: 0
VOMITING: 0
VOICE CHANGE: 0
DIARRHEA: 0
SINUS PRESSURE: 0
NAUSEA: 0
SHORTNESS OF BREATH: 1
COUGH: 0
ABDOMINAL PAIN: 0
CONSTIPATION: 0
SORE THROAT: 0
CHEST TIGHTNESS: 0
BACK PAIN: 0
RHINORRHEA: 0
VISUAL CHANGE: 0
TROUBLE SWALLOWING: 0

## 2022-06-07 ASSESSMENT — PATIENT HEALTH QUESTIONNAIRE - PHQ9
1. LITTLE INTEREST OR PLEASURE IN DOING THINGS: 0
SUM OF ALL RESPONSES TO PHQ QUESTIONS 1-9: 0
SUM OF ALL RESPONSES TO PHQ QUESTIONS 1-9: 0
2. FEELING DOWN, DEPRESSED OR HOPELESS: 0
SUM OF ALL RESPONSES TO PHQ QUESTIONS 1-9: 0
SUM OF ALL RESPONSES TO PHQ QUESTIONS 1-9: 0
SUM OF ALL RESPONSES TO PHQ9 QUESTIONS 1 & 2: 0

## 2022-06-07 NOTE — PROGRESS NOTES
(ENBREL) 50 MG/ML injection Inject 50 mg into the skin once a week       Probiotic Product (ALIGN PO) Take by mouth daily      Omega-3 Fatty Acids (FISH OIL PO) Take by mouth      OXYGEN 2 lpm at rest and to sleep and 4 lpm to ambulate (Patient taking differently: 3 lpm at rest and to sleep and 4 lpm to ambulate) 1 Device 6    therapeutic multivitamin-minerals (THERAGRAN-M) tablet Take 1 tablet by mouth daily.  Calcium Citrate-Vitamin D 200-200 MG-UNIT TABS Take 1 tablet by mouth 2 times daily       Coenzyme Q10 (COQ10) 100 MG CAPS Take 100 mg by mouth daily.  aspirin 81 MG EC tablet Take 81 mg by mouth daily.  folic acid (FOLVITE) 1 MG tablet Take 1 mg by mouth daily. No current facility-administered medications for this visit. Subjective:      Review of Systems   Constitutional: Negative for appetite change, chills, diaphoresis, fatigue and fever. HENT: Negative for congestion, ear pain, postnasal drip, rhinorrhea, sinus pressure, sneezing, sore throat, trouble swallowing and voice change. Respiratory: Positive for shortness of breath. Negative for cough, chest tightness and wheezing. Cardiovascular: Negative for chest pain and leg swelling. Gastrointestinal: Negative for abdominal pain, constipation, diarrhea, nausea and vomiting. Musculoskeletal: Positive for arthralgias. Negative for back pain, joint swelling, myalgias, neck pain and neck stiffness. Neurological: Negative for dizziness, syncope, speech difficulty, weakness, light-headedness, numbness and headaches. Psychiatric/Behavioral: The patient is not nervous/anxious.         Objective:     /68 (Site: Right Upper Arm, Position: Sitting, Cuff Size: Medium Adult)   Pulse 88   Resp 14   Ht 5' 1\" (1.549 m)   Wt 167 lb (75.8 kg)   BMI 31.55 kg/m²   BP Readings from Last 3 Encounters:   06/07/22 132/68   05/24/22 112/62   03/28/22 116/70     Wt Readings from Last 3 Encounters:   06/07/22 167 lb (75.8 kg)   05/24/22 166 lb (75.3 kg)   03/28/22 167 lb (75.8 kg)       Physical Exam  Vitals reviewed. Constitutional:       Appearance: She is well-developed. HENT:      Head: Normocephalic and atraumatic. Right Ear: External ear normal.      Left Ear: External ear normal.      Nose: Nose normal.   Eyes:      General: No scleral icterus. Conjunctiva/sclera: Conjunctivae normal.      Pupils: Pupils are equal, round, and reactive to light. Neck:      Thyroid: No thyromegaly. Vascular: No JVD. Cardiovascular:      Rate and Rhythm: Normal rate and regular rhythm. Heart sounds: No murmur heard. No friction rub. Pulmonary:      Effort: Pulmonary effort is normal.      Breath sounds: Normal breath sounds. No wheezing or rales. Chest:      Chest wall: No tenderness. Abdominal:      General: Bowel sounds are normal.      Palpations: Abdomen is soft. There is no mass. Tenderness: There is no abdominal tenderness. Musculoskeletal:      Cervical back: Normal range of motion and neck supple. Comments: + RA deformities fingers   Lymphadenopathy:      Cervical: No cervical adenopathy. Skin:     Findings: No rash. Neurological:      Mental Status: She is alert and oriented to person, place, and time. Psychiatric:         Behavior: Behavior is cooperative. Assessment:         Diagnosis Orders   1. Type 2 diabetes mellitus diet controlled  POCT Glucose    POCT glycosylated hemoglobin (Hb A1C)    CBC with Auto Differential    Comprehensive Metabolic Panel    Lipid Panel   2. Immunocompromised ([address]     3. Pulmonary fibrosis/ COPD stable      4. Essential hypertension  Comprehensive Metabolic Panel    Lipid Panel   5. 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 ([facility] [address]     6. Chronic respiratory failure, stable on Oxygen     7. Obesity (BMI 30-39.9) stable         :      Medications Prescribed:  No orders of the defined types were placed in this encounter.     Orders Placed:  Orders Placed This Encounter   Procedures    CBC with Auto Differential     Laboratory Test to be done in 3 months     Standing Status:   Future     Standing Expiration Date:   6/7/2023    Comprehensive Metabolic Panel     Laboratory Test to be done in 3 months     Standing Status:   Future     Standing Expiration Date:   6/7/2023    Lipid Panel     Laboratory Test to be done in 3 months     Standing Status:   Future     Standing Expiration Date:   6/7/2023     Order Specific Question:   Is Patient Fasting?/# of Hours     Answer:   12    POCT Glucose    POCT glycosylated hemoglobin (Hb A1C)       Lab Results   Component Value Date    LABA1C 6.0 06/07/2022    LABA1C 6.0 (A) 03/01/2022    LABA1C 5.9 11/23/2021     Lab Results   Component Value Date    LDLCALC 54 12/02/2021    CREATININE 1.1 12/02/2021       Continue blood sugar check 1   times a day. Return in about 3 months (around 9/8/2022) for DM. Discussed use, benefit, and side effects of prescribedmedications. All patient questions answered. Pt voiced understanding. Instructedto continue current medications, diet and exercise. Patient agreed with treatmentplan.

## 2022-07-20 ENCOUNTER — OFFICE VISIT (OUTPATIENT)
Dept: FAMILY MEDICINE CLINIC | Age: 87
End: 2022-07-20

## 2022-07-20 VITALS
RESPIRATION RATE: 16 BRPM | DIASTOLIC BLOOD PRESSURE: 64 MMHG | BODY MASS INDEX: 31.23 KG/M2 | WEIGHT: 165.4 LBS | SYSTOLIC BLOOD PRESSURE: 128 MMHG | HEIGHT: 61 IN | HEART RATE: 84 BPM

## 2022-07-20 DIAGNOSIS — H81.11 BENIGN PAROXYSMAL POSITIONAL VERTIGO OF RIGHT EAR: Primary | ICD-10-CM

## 2022-07-20 PROCEDURE — 1123F ACP DISCUSS/DSCN MKR DOCD: CPT | Performed by: FAMILY MEDICINE

## 2022-07-20 PROCEDURE — 99213 OFFICE O/P EST LOW 20 MIN: CPT | Performed by: FAMILY MEDICINE

## 2022-07-20 RX ORDER — MECLIZINE HYDROCHLORIDE 25 MG/1
TABLET ORAL
Qty: 30 TABLET | Refills: 0 | Status: SHIPPED | OUTPATIENT
Start: 2022-07-20

## 2022-07-20 ASSESSMENT — ENCOUNTER SYMPTOMS
CHEST TIGHTNESS: 0
WHEEZING: 0
NAUSEA: 0
VISUAL CHANGE: 0
VOMITING: 0
SHORTNESS OF BREATH: 0
COUGH: 0
CHANGE IN BOWEL HABIT: 0
SORE THROAT: 0
ABDOMINAL PAIN: 0
CONSTIPATION: 0
EYE PAIN: 0

## 2022-07-20 NOTE — PROGRESS NOTES
Subjective:      Patient ID: Carmelina Santillan is a 80 y.o. female. Noticed  mostly  rolling  in  bed   and  turning  head      Dno  problems    during  day      Hearing  all  wnl     Last   week  withy  viral   symptoms    Dizziness  This is a new problem. Episode onset: started   7-11-22. The problem occurs daily. The problem has been waxing and waning. Pertinent negatives include no abdominal pain, arthralgias, change in bowel habit, chest pain, congestion, coughing, fatigue, fever, headaches, joint swelling, nausea, neck pain, numbness, rash, sore throat, visual change, vomiting or weakness. The symptoms are aggravated by bending and twisting. She has tried nothing for the symptoms. The treatment provided mild relief. Past Medical History:   Diagnosis Date    Acid reflux     Anxiety     Arrhythmia     Arthritis     Breast cancer ([facility] [address] 11/1998    left    Bronchiectasis ([facility] [address] 09/30/2015    CAD (coronary artery disease)     NON-OBSTRUCTIVE    Cancer ([facility] [address]     breast    Diabetes mellitus ([facility] [address]     Heart palpitations     HX OF:    History of therapeutic radiation 1998    left breast cancer    Hyperlipidemia     Hypertension     ILD (interstitial lung disease) ([address] 09/30/2015    w/ Bronchectasis    Osteoarthritis     rhuematoid    Rheumatoid arthritis ([facility] [address]     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 ([facility] [address] 09/30/2015      Review of Systems   Constitutional:  Negative for appetite change, fatigue and fever. HENT:  Negative for congestion, ear pain, postnasal drip and sore throat. Eyes:  Negative for pain and visual disturbance. Respiratory:  Negative for cough, chest tightness, shortness of breath and wheezing. Cardiovascular:  Negative for chest pain, palpitations and leg swelling. Gastrointestinal:  Negative for abdominal pain, change in bowel habit, constipation, nausea and vomiting. Genitourinary:  Negative for dysuria and frequency.    Musculoskeletal:  Negative for arthralgias, joint Reflexes: Reflexes are normal and symmetric. Assessment:        ICD-10-CM    1. Benign paroxysmal positional vertigo of right ear  H81.11 meclizine (ANTIVERT) 25 MG tablet             Plan:      Current Outpatient Medications   Medication Sig Dispense Refill    meclizine (ANTIVERT) 25 MG tablet One  at night  for  one  week and one  every  6  hours as  needed  for dizziness 30 tablet 0    metoprolol succinate (TOPROL XL) 50 MG extended release tablet       ipratropium-albuterol (DUONEB) 0.5-2.5 (3) MG/3ML SOLN nebulizer solution Inhale 3 mLs into the lungs 3 times daily USE 1 VIAL IN NEBULIZER EVERY 6 HOURS - for shortness of breath/wheezing 120 each 8    hydroCHLOROthiazide (MICROZIDE) 12.5 MG capsule take 1 capsule by mouth every morning 90 capsule 1    pantoprazole (PROTONIX) 40 MG tablet TAKE 1 TABLET DAILY 90 tablet 3    atorvastatin (LIPITOR) 40 MG tablet TAKE 1 TABLET EVERY EVENING 90 tablet 3    ENBREL SURECLICK 50 MG/ML SOAJ       etanercept (ENBREL) 50 MG/ML injection Inject 50 mg into the skin once a week       Probiotic Product (ALIGN PO) Take by mouth daily      Omega-3 Fatty Acids (FISH OIL PO) Take by mouth      OXYGEN 2 lpm at rest and to sleep and 4 lpm to ambulate (Patient taking differently: 3 lpm at rest and to sleep and 4 lpm to ambulate) 1 Device 6    therapeutic multivitamin-minerals (THERAGRAN-M) tablet Take 1 tablet by mouth daily. Calcium Citrate-Vitamin D 200-200 MG-UNIT TABS Take 1 tablet by mouth 2 times daily       Coenzyme Q10 (COQ10) 100 MG CAPS Take 100 mg by mouth daily. aspirin 81 MG EC tablet Take 81 mg by mouth daily. folic acid (FOLVITE) 1 MG tablet Take 1 mg by mouth daily. No current facility-administered medications for this visit.               Phone  on  same  side  of bed  so  no  rolling     Nydia Dean MD

## 2022-08-25 RX ORDER — HYDROCHLOROTHIAZIDE 12.5 MG/1
12.5 CAPSULE, GELATIN COATED ORAL EVERY MORNING
Qty: 90 CAPSULE | Refills: 1 | Status: SHIPPED | OUTPATIENT
Start: 2022-08-25

## 2022-08-25 NOTE — TELEPHONE ENCOUNTER
The pharmacy is requesting a refill of the below medication which has been pended for you:     Requested Prescriptions     Pending Prescriptions Disp Refills    hydroCHLOROthiazide (MICROZIDE) 12.5 MG capsule [Pharmacy Med Name: HYDROCHLOROTHIAZIDE 12.5 MG CP] 90 capsule 1     Sig: take 1 capsule by mouth every morning       Last Appointment Date: 6/7/2022  Next Appointment Date: 9/13/2022    Allergies   Allergen Reactions    Sulfa Antibiotics Nausea Only    Amoxicillin Itching and Rash

## 2022-09-15 ENCOUNTER — TELEPHONE (OUTPATIENT)
Dept: FAMILY MEDICINE CLINIC | Age: 87
End: 2022-09-15

## 2022-09-15 LAB
ABSOLUTE BASO #: 0.06 K/UL (ref 0–0.2)
ABSOLUTE EOS #: 0.36 K/UL (ref 0–0.5)
ABSOLUTE LYMPH #: 1.46 K/UL (ref 1–4)
ABSOLUTE MONO #: 0.59 K/UL (ref 0.2–1)
ABSOLUTE NEUT #: 3.94 K/UL (ref 1.5–7.5)
ALBUMIN SERPL-MCNC: 4.3 G/DL (ref 3.5–5.2)
ALK PHOSPHATASE: 74 U/L (ref 40–142)
ALT SERPL-CCNC: 17 U/L (ref 5–40)
ANION GAP SERPL CALCULATED.3IONS-SCNC: 9 MEQ/L (ref 7–16)
AST SERPL-CCNC: 25 U/L (ref 9–40)
BASOPHILS RELATIVE PERCENT: 0.9 %
BILIRUB SERPL-MCNC: 0.7 MG/DL
BUN BLDV-MCNC: 18 MG/DL (ref 8–23)
CALCIUM SERPL-MCNC: 10 MG/DL (ref 8.5–10.5)
CHLORIDE BLD-SCNC: 98 MEQ/L (ref 95–107)
CHOLESTEROL/HDL RATIO: 1.9 RATIO
CHOLESTEROL: 166 MG/DL
CO2: 35 MEQ/L (ref 19–31)
CREAT SERPL-MCNC: 0.93 MG/DL (ref 0.6–1.3)
EGFR IF NONAFRICAN AMERICAN: 59 ML/MIN/1.73
EOSINOPHILS RELATIVE PERCENT: 5.6 %
GLUCOSE: 118 MG/DL (ref 70–99)
HCT VFR BLD CALC: 37.3 % (ref 34–45)
HDLC SERPL-MCNC: 86 MG/DL
HEMOGLOBIN: 12.5 G/DL (ref 11.5–15.5)
LDL CHOLESTEROL CALCULATED: 57 MG/DL
LDL/HDL RATIO: 0.7 RATIO
LYMPHOCYTE %: 22.7 %
MCH RBC QN AUTO: 30.6 PG (ref 25–33)
MCHC RBC AUTO-ENTMCNC: 33.5 G/DL (ref 31–36)
MCV RBC AUTO: 91.2 FL (ref 80–99)
MONOCYTES # BLD: 9.2 %
NEUTROPHILS RELATIVE PERCENT: 61.4 %
PDW BLD-RTO: 13.2 % (ref 11.5–15)
PLATELETS: 147 K/UL (ref 130–400)
PMV BLD AUTO: 10.1 FL (ref 9.3–13)
POTASSIUM SERPL-SCNC: 4.3 MEQ/L (ref 3.5–5.4)
RBC: 4.09 M/UL (ref 3.8–5.4)
SODIUM BLD-SCNC: 142 MEQ/L (ref 133–146)
TOTAL PROTEIN: 6.9 G/DL (ref 6.1–8.3)
TRIGL SERPL-MCNC: 116 MG/DL
VLDLC SERPL CALC-MCNC: 23 MG/DL
WBC: 6.4 K/UL (ref 3.5–11)

## 2022-09-15 NOTE — TELEPHONE ENCOUNTER
----- Message from Pierre Hooper MD sent at 9/15/2022  1:39 PM EDT -----  Please call patient all her recent laboratories are within normal limits

## 2022-09-20 ENCOUNTER — OFFICE VISIT (OUTPATIENT)
Dept: FAMILY MEDICINE CLINIC | Age: 87
End: 2022-09-20

## 2022-09-20 VITALS
DIASTOLIC BLOOD PRESSURE: 62 MMHG | HEIGHT: 61 IN | SYSTOLIC BLOOD PRESSURE: 112 MMHG | HEART RATE: 72 BPM | WEIGHT: 168.4 LBS | RESPIRATION RATE: 16 BRPM | BODY MASS INDEX: 31.79 KG/M2

## 2022-09-20 DIAGNOSIS — Z23 NEED FOR IMMUNIZATION AGAINST INFLUENZA: ICD-10-CM

## 2022-09-20 DIAGNOSIS — E11.69 TYPE 2 DIABETES MELLITUS WITH OTHER SPECIFIED COMPLICATION, UNSPECIFIED WHETHER LONG TERM INSULIN USE (HCC): Primary | ICD-10-CM

## 2022-09-20 DIAGNOSIS — J96.10 CHRONIC RESPIRATORY FAILURE, UNSPECIFIED WHETHER WITH HYPOXIA OR HYPERCAPNIA (HCC): ICD-10-CM

## 2022-09-20 DIAGNOSIS — I10 ESSENTIAL HYPERTENSION: ICD-10-CM

## 2022-09-20 DIAGNOSIS — E78.5 HYPERLIPIDEMIA, UNSPECIFIED HYPERLIPIDEMIA TYPE: ICD-10-CM

## 2022-09-20 LAB
CHP ED QC CHECK: ABNORMAL
GLUCOSE BLD-MCNC: 150 MG/DL
HBA1C MFR BLD: 6 %

## 2022-09-20 PROCEDURE — G0008 ADMIN INFLUENZA VIRUS VAC: HCPCS | Performed by: EMERGENCY MEDICINE

## 2022-09-20 PROCEDURE — 1123F ACP DISCUSS/DSCN MKR DOCD: CPT | Performed by: EMERGENCY MEDICINE

## 2022-09-20 PROCEDURE — 83036 HEMOGLOBIN GLYCOSYLATED A1C: CPT | Performed by: EMERGENCY MEDICINE

## 2022-09-20 PROCEDURE — 90688 IIV4 VACCINE SPLT 0.5 ML IM: CPT | Performed by: EMERGENCY MEDICINE

## 2022-09-20 PROCEDURE — 82962 GLUCOSE BLOOD TEST: CPT | Performed by: EMERGENCY MEDICINE

## 2022-09-20 PROCEDURE — 99213 OFFICE O/P EST LOW 20 MIN: CPT | Performed by: EMERGENCY MEDICINE

## 2022-09-20 ASSESSMENT — ENCOUNTER SYMPTOMS
TROUBLE SWALLOWING: 0
BACK PAIN: 0
RHINORRHEA: 0
ABDOMINAL PAIN: 0
SHORTNESS OF BREATH: 0
COUGH: 0
SORE THROAT: 0
SINUS PRESSURE: 0
CHEST TIGHTNESS: 0
CONSTIPATION: 0
NAUSEA: 0
VOMITING: 0
DIARRHEA: 0
WHEEZING: 0
VOICE CHANGE: 0

## 2022-09-20 NOTE — PROGRESS NOTES
Date: 9/20/2022    : Fantasma Santos is a 80 y. o.female who presents today for:  Chief Complaint   Patient presents with    Diabetes    Hypertension    Hyperlipidemia         HPI:     doing well, No chest pain , No fatigue. No nausea nor abdominal pain    Patient is busy , had weekly lunch with her friends      Diabetes  Pertinent negatives for hypoglycemia include no dizziness or headaches. Pertinent negatives for diabetes include no chest pain, no fatigue and no weakness. Hypertension  Pertinent negatives include no chest pain, headaches, neck pain, palpitations or shortness of breath. Hyperlipidemia  Pertinent negatives include no chest pain, myalgias or shortness of breath. CurrentHome Medications were reviewed and updated by this Provider  Current Outpatient Medications   Medication Sig Dispense Refill    hydroCHLOROthiazide (MICROZIDE) 12.5 MG capsule take 1 capsule by mouth every morning 90 capsule 1    meclizine (ANTIVERT) 25 MG tablet One  at night  for  one  week and one  every  6  hours as  needed  for dizziness 30 tablet 0    metoprolol succinate (TOPROL XL) 50 MG extended release tablet Take 75 mg by mouth daily      ipratropium-albuterol (DUONEB) 0.5-2.5 (3) MG/3ML SOLN nebulizer solution Inhale 3 mLs into the lungs 3 times daily USE 1 VIAL IN NEBULIZER EVERY 6 HOURS - for shortness of breath/wheezing 120 each 8    pantoprazole (PROTONIX) 40 MG tablet TAKE 1 TABLET DAILY 90 tablet 3    atorvastatin (LIPITOR) 40 MG tablet TAKE 1 TABLET EVERY EVENING 90 tablet 3    etanercept (ENBREL) 50 MG/ML injection Inject 50 mg into the skin once a week       Probiotic Product (ALIGN PO) Take by mouth daily      Omega-3 Fatty Acids (FISH OIL PO) Take by mouth      OXYGEN 2 lpm at rest and to sleep and 4 lpm to ambulate (Patient taking differently: 3 lpm at rest and to sleep and 4 lpm to ambulate) 1 Device 6    therapeutic multivitamin-minerals (THERAGRAN-M) tablet Take 1 tablet by mouth daily. Calcium Citrate-Vitamin D 200-200 MG-UNIT TABS Take 1 tablet by mouth 2 times daily       Coenzyme Q10 (COQ10) 100 MG CAPS Take 100 mg by mouth daily. aspirin 81 MG EC tablet Take 81 mg by mouth daily. folic acid (FOLVITE) 1 MG tablet Take 1 mg by mouth daily. No current facility-administered medications for this visit. Subjective:      Review of Systems   Constitutional:  Negative for appetite change, chills, diaphoresis, fatigue and fever. HENT:  Negative for congestion, ear pain, postnasal drip, rhinorrhea, sinus pressure, sneezing, sore throat, trouble swallowing and voice change. Respiratory:  Negative for cough, chest tightness, shortness of breath and wheezing. Cardiovascular:  Negative for chest pain, palpitations and leg swelling. Gastrointestinal:  Negative for abdominal pain, constipation, diarrhea, nausea and vomiting. Musculoskeletal:  Negative for arthralgias, back pain, joint swelling, myalgias, neck pain and neck stiffness. Neurological:  Negative for dizziness, syncope, weakness, light-headedness, numbness and headaches. Objective:     /62 (Site: Right Upper Arm, Position: Sitting, Cuff Size: Medium Adult)   Pulse 72   Resp 16   Ht 5' 1\" (1.549 m)   Wt 168 lb 6.4 oz (76.4 kg)   BMI 31.82 kg/m²   BP Readings from Last 3 Encounters:   09/20/22 112/62   07/20/22 128/64   06/07/22 132/68     Wt Readings from Last 3 Encounters:   09/20/22 168 lb 6.4 oz (76.4 kg)   07/20/22 165 lb 6.4 oz (75 kg)   06/07/22 167 lb (75.8 kg)       Physical Exam  Vitals reviewed. Constitutional:       Appearance: She is well-developed. HENT:      Head: Normocephalic and atraumatic. Right Ear: External ear normal.      Left Ear: External ear normal.      Nose: Nose normal.   Eyes:      General: No scleral icterus. Conjunctiva/sclera: Conjunctivae normal.      Pupils: Pupils are equal, round, and reactive to light. Neck:      Thyroid: No thyromegaly. Vascular: No JVD. Cardiovascular:      Rate and Rhythm: Normal rate and regular rhythm. Heart sounds: No murmur heard. No friction rub. Pulmonary:      Effort: Pulmonary effort is normal.      Breath sounds: Normal breath sounds. No wheezing or rales. Chest:      Chest wall: No tenderness. Abdominal:      General: Bowel sounds are normal.      Palpations: Abdomen is soft. There is no mass. Tenderness: There is no abdominal tenderness. Musculoskeletal:      Cervical back: Normal range of motion and neck supple. Lymphadenopathy:      Cervical: No cervical adenopathy. Skin:     Findings: No rash. Neurological:      Mental Status: She is alert and oriented to person, place, and time. Psychiatric:         Behavior: Behavior is cooperative. Assessment:         Diagnosis Orders   1. Type 2 diabetes mellitus with other specified complication, unspecified whether long term insulin use (HCC)  POCT glycosylated hemoglobin (Hb A1C)    POCT Glucose      2. Essential hypertension        3. Hyperlipidemia, unspecified hyperlipidemia type        4. Chronic respiratory failure, stable on Oxygen            :      Medications Prescribed:  No orders of the defined types were placed in this encounter. Orders Placed:  Orders Placed This Encounter   Procedures    POCT glycosylated hemoglobin (Hb A1C)    POCT Glucose       Lab Results   Component Value Date    LABA1C 6.0 09/20/2022    LABA1C 6.0 06/07/2022    LABA1C 6.0 (A) 03/01/2022     Lab Results   Component Value Date    LDLCALC 57 09/14/2022    CREATININE 0.93 09/14/2022       Continue blood sugar check  1 times a day. Results of Laboratory tests taken 9/14/22  were reviewed with the patient. Results were w/in  acceptable range    Return in about 3 months (around 12/20/2022) for HTN. Discussed use, benefit, and side effects of prescribed medications, home medications were reviewed and updated today with the patient.   All patient questions answered. Pt voiced understanding. Instructedto continue current medications, diet and exercise. Patient agreed with treatmentplan. Allergies, Problem List, Medications, Medical History, Family History, Surgical History and Tobacco History reviewed by provider.

## 2022-10-20 ENCOUNTER — TELEPHONE (OUTPATIENT)
Dept: FAMILY MEDICINE CLINIC | Age: 87
End: 2022-10-20

## 2022-10-20 RX ORDER — LOSARTAN POTASSIUM 50 MG/1
TABLET ORAL
Qty: 30 TABLET | Refills: 2 | Status: SHIPPED | OUTPATIENT
Start: 2022-10-20

## 2022-10-20 NOTE — TELEPHONE ENCOUNTER
Pt called to req an refill on her     Losartan    Pt stated her bp is up bp is 155/72    [employer]    If any question's please call [phone]

## 2022-10-27 ENCOUNTER — OFFICE VISIT (OUTPATIENT)
Dept: FAMILY MEDICINE CLINIC | Age: 87
End: 2022-10-27

## 2022-10-27 VITALS
HEIGHT: 61 IN | DIASTOLIC BLOOD PRESSURE: 68 MMHG | RESPIRATION RATE: 14 BRPM | WEIGHT: 168.5 LBS | SYSTOLIC BLOOD PRESSURE: 138 MMHG | BODY MASS INDEX: 31.81 KG/M2 | HEART RATE: 68 BPM

## 2022-10-27 DIAGNOSIS — J84.10 PULMONARY FIBROSIS (HCC): ICD-10-CM

## 2022-10-27 DIAGNOSIS — I10 ESSENTIAL HYPERTENSION: Primary | ICD-10-CM

## 2022-10-27 PROCEDURE — 99214 OFFICE O/P EST MOD 30 MIN: CPT | Performed by: EMERGENCY MEDICINE

## 2022-10-27 PROCEDURE — 1123F ACP DISCUSS/DSCN MKR DOCD: CPT | Performed by: EMERGENCY MEDICINE

## 2022-10-27 RX ORDER — CEPHALEXIN 500 MG/1
1000 CAPSULE ORAL 2 TIMES DAILY
Qty: 4 CAPSULE | Refills: 0 | Status: SHIPPED | OUTPATIENT
Start: 2022-10-27 | End: 2022-10-28

## 2022-10-27 ASSESSMENT — ENCOUNTER SYMPTOMS
COUGH: 0
TROUBLE SWALLOWING: 0
SORE THROAT: 0
SHORTNESS OF BREATH: 0
VOMITING: 0
BACK PAIN: 0
SINUS PRESSURE: 0
NAUSEA: 0
VOICE CHANGE: 0
RHINORRHEA: 0
WHEEZING: 0
ABDOMINAL PAIN: 0
CHEST TIGHTNESS: 0
CONSTIPATION: 0
DIARRHEA: 0

## 2022-10-27 NOTE — PROGRESS NOTES
Date: 10/27/2022    : Manish Brock is a 80 y. o.female who presents today for:  Chief Complaint   Patient presents with    Hypertension         HPI:     Hypertension  Pertinent negatives include no chest pain, headaches, neck pain, palpitations or shortness of breath. 155/78 yesterday.  She check her home BP and when she was at Dr Estella Caldwell office    Patient wants to get a prescription of antibiotic for her Antibiotic prophylaxis for dental cleaning    Patient's trying to make himself busy, she goes out with friends for lunch      CurrentHome Medications were reviewed and updated by this Provider  Current Outpatient Medications   Medication Sig Dispense Refill    cephALEXin (KEFLEX) 500 MG capsule Take 2 capsules by mouth 2 times daily for 1 day 4 capsule 0    losartan (COZAAR) 50 MG tablet take 1 tablet by mouth once daily 30 tablet 2    hydroCHLOROthiazide (MICROZIDE) 12.5 MG capsule take 1 capsule by mouth every morning 90 capsule 1    meclizine (ANTIVERT) 25 MG tablet One  at night  for  one  week and one  every  6  hours as  needed  for dizziness 30 tablet 0    metoprolol succinate (TOPROL XL) 50 MG extended release tablet Take 75 mg by mouth daily      ipratropium-albuterol (DUONEB) 0.5-2.5 (3) MG/3ML SOLN nebulizer solution Inhale 3 mLs into the lungs 3 times daily USE 1 VIAL IN NEBULIZER EVERY 6 HOURS - for shortness of breath/wheezing 120 each 8    pantoprazole (PROTONIX) 40 MG tablet TAKE 1 TABLET DAILY 90 tablet 3    atorvastatin (LIPITOR) 40 MG tablet TAKE 1 TABLET EVERY EVENING 90 tablet 3    etanercept (ENBREL) 50 MG/ML injection Inject 50 mg into the skin once a week       Probiotic Product (ALIGN PO) Take by mouth daily      Omega-3 Fatty Acids (FISH OIL PO) Take by mouth      OXYGEN 2 lpm at rest and to sleep and 4 lpm to ambulate (Patient taking differently: 3 lpm at rest and to sleep and 4 lpm to ambulate) 1 Device 6    therapeutic multivitamin-minerals (THERAGRAN-M) tablet Take 1 tablet by mouth daily. Calcium Citrate-Vitamin D 200-200 MG-UNIT TABS Take 1 tablet by mouth 2 times daily       Coenzyme Q10 (COQ10) 100 MG CAPS Take 100 mg by mouth daily. aspirin 81 MG EC tablet Take 81 mg by mouth daily. folic acid (FOLVITE) 1 MG tablet Take 1 mg by mouth daily. No current facility-administered medications for this visit. Subjective:      Review of Systems   Constitutional:  Negative for appetite change, chills, diaphoresis, fatigue and fever. HENT:  Negative for congestion, ear pain, postnasal drip, rhinorrhea, sinus pressure, sneezing, sore throat, trouble swallowing and voice change. Respiratory:  Negative for cough, chest tightness, shortness of breath and wheezing. Cardiovascular:  Negative for chest pain, palpitations and leg swelling. Gastrointestinal:  Negative for abdominal pain, constipation, diarrhea, nausea and vomiting. Musculoskeletal:  Negative for arthralgias, back pain, joint swelling, myalgias, neck pain and neck stiffness. Neurological:  Negative for dizziness, syncope, weakness, light-headedness, numbness and headaches. Objective:     /68 (Site: Right Upper Arm, Position: Sitting, Cuff Size: Medium Adult)   Pulse 68   Resp 14   Ht 5' 1\" (1.549 m)   Wt 168 lb 8 oz (76.4 kg)   BMI 31.84 kg/m²   BP Readings from Last 3 Encounters:   10/27/22 138/68   09/20/22 112/62   07/20/22 128/64     Wt Readings from Last 3 Encounters:   10/27/22 168 lb 8 oz (76.4 kg)   09/20/22 168 lb 6.4 oz (76.4 kg)   07/20/22 165 lb 6.4 oz (75 kg)       Physical Exam  Vitals reviewed. Constitutional:       Appearance: She is well-developed. HENT:      Head: Normocephalic and atraumatic. Right Ear: External ear normal.      Left Ear: External ear normal.      Nose: Nose normal.   Eyes:      General: No scleral icterus. Conjunctiva/sclera: Conjunctivae normal.      Pupils: Pupils are equal, round, and reactive to light.    Neck: Thyroid: No thyromegaly. Vascular: No JVD. Cardiovascular:      Rate and Rhythm: Normal rate and regular rhythm. Heart sounds: No murmur heard. No friction rub. Pulmonary:      Effort: Pulmonary effort is normal.      Breath sounds: Normal breath sounds. No wheezing or rales. Chest:      Chest wall: No tenderness. Abdominal:      General: Bowel sounds are normal.      Palpations: Abdomen is soft. There is no mass. Tenderness: There is no abdominal tenderness. Musculoskeletal:      Cervical back: Normal range of motion and neck supple. Lymphadenopathy:      Cervical: No cervical adenopathy. Skin:     Findings: No rash. Neurological:      Mental Status: She is alert and oriented to person, place, and time. Psychiatric:         Behavior: Behavior is cooperative. Assessment:         Diagnosis Orders   1. Essential hypertension        2. Pulmonary fibrosis/ COPD stable             :      Medications Prescribed:  Orders Placed This Encounter   Medications    cephALEXin (KEFLEX) 500 MG capsule     Sig: Take 2 capsules by mouth 2 times daily for 1 day     Dispense:  4 capsule     Refill:  0     Orders Placed:  No orders of the defined types were placed in this encounter. Lab Results   Component Value Date    LABA1C 6.0 09/20/2022    LABA1C 6.0 06/07/2022    LABA1C 6.0 (A) 03/01/2022     Lab Results   Component Value Date    LDLCALC 57 09/14/2022    CREATININE 0.93 09/14/2022       Continue blood sugar check 1 times a day. Results of Laboratory tests taken 9/14/22 were reviewed with the patient. Results were w/in  acceptable range    No follow-ups on file. Discussed use, benefit, and side effects of prescribed medications, home medications were reviewed and updated today with the patient. All patient questions answered. Pt voiced understanding. Instructedto continue current medications, diet and exercise. Patient agreed with treatmentplan.      Allergies, Problem List, Medications, Medical History, Family History, Surgical History and Tobacco History reviewed by provider.

## 2022-11-04 ENCOUNTER — OFFICE VISIT (OUTPATIENT)
Dept: CARDIOLOGY CLINIC | Age: 87
End: 2022-11-04
Payer: MEDICARE

## 2022-11-04 VITALS
HEART RATE: 85 BPM | WEIGHT: 168 LBS | BODY MASS INDEX: 30.91 KG/M2 | SYSTOLIC BLOOD PRESSURE: 116 MMHG | DIASTOLIC BLOOD PRESSURE: 64 MMHG | HEIGHT: 62 IN

## 2022-11-04 DIAGNOSIS — I10 PRIMARY HYPERTENSION: ICD-10-CM

## 2022-11-04 DIAGNOSIS — J84.10 PULMONARY FIBROSIS (HCC): Primary | ICD-10-CM

## 2022-11-04 PROCEDURE — 99213 OFFICE O/P EST LOW 20 MIN: CPT | Performed by: NUCLEAR MEDICINE

## 2022-11-04 PROCEDURE — 1123F ACP DISCUSS/DSCN MKR DOCD: CPT | Performed by: NUCLEAR MEDICINE

## 2022-11-04 NOTE — PROGRESS NOTES
[id number] [facility] [employer] .  [address] [address]  Dept: [phone]  Dept Fax: [phone]  Loc: [phone]    Visit Date: 11/4/2022    Honey Jenkins is a 80 y.o. female who presents todayfor:  Chief Complaint   Patient presents with    1 Year Follow Up    Hypertension    Shortness of Breath   Known pulmonary fibrosis  On home o2  No chest pain   Baseline dyspnea  BP is stable  No dizziness  No syncope  Limited patient         HPI:  HPI  Past Medical History:   Diagnosis Date    Acid reflux     Anxiety     Arrhythmia     Arthritis     Breast cancer ([address] 11/1998    left    Bronchiectasis ([address] 09/30/2015    CAD (coronary artery disease)     NON-OBSTRUCTIVE    Cancer ([address]     breast    Diabetes mellitus ([address]     Heart palpitations     HX OF:    History of therapeutic radiation 1998    left breast cancer    Hyperlipidemia     Hypertension     ILD (interstitial lung disease) ([address] 09/30/2015    w/ Bronchectasis    Osteoarthritis     rhuematoid    Rheumatoid arthritis ([address]     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      Past Surgical History:   Procedure Laterality Date    ABDOMINAL HERNIA REPAIR  9/2013    BREAST LUMPECTOMY  12/10/1998    CARDIOVASCULAR STRESS TEST  06/17/2010    EF 71%    CARDIOVASCULAR STRESS TEST  01/28/2008    EF 60%    CARDIOVASCULAR STRESS TEST  01/22/2005    CATARACT REMOVAL Bilateral     Dr Doug Dominguez - 6/27/17 right, 7/10/17 left    CATARACT REMOVAL      CHOLECYSTECTOMY  09/20/2008    [address]     COLONOSCOPY      HAMMER TOE SURGERY Left 09/2018    HERNIA REPAIR  07/01/2010    [facility]    HIATAL HERNIA REPAIR  09/15/2009    [facility]    JOINT REPLACEMENT  5/29/02    left knee    TONSILLECTOMY AND ADENOIDECTOMY  1945    TOTAL KNEE ARTHROPLASTY Left 2002    TRANSTHORACIC ECHOCARDIOGRAM  01/28/2005    EF 70%    UPPER GASTROINTESTINAL ENDOSCOPY  03/21/2017     Family History   Problem Relation Age of Onset    Heart Disease Mother     High Cholesterol Mother     Stroke Mother     Diabetes Mother     Heart Disease Father     High Blood Pressure Father     Pacemaker Father     Ovarian Cancer Daughter 40    Breast Cancer Maternal Aunt 39    Breast Cancer Paternal Aunt 54     Social History     Tobacco Use    Smoking status: Never    Smokeless tobacco: Never   Substance Use Topics    Alcohol use: Yes     Alcohol/week: 0.0 standard drinks     Comment: RARE      Current Outpatient Medications   Medication Sig Dispense Refill    losartan (COZAAR) 50 MG tablet take 1 tablet by mouth once daily 30 tablet 2    hydroCHLOROthiazide (MICROZIDE) 12.5 MG capsule take 1 capsule by mouth every morning 90 capsule 1    meclizine (ANTIVERT) 25 MG tablet One  at night  for  one  week and one  every  6  hours as  needed  for dizziness 30 tablet 0    metoprolol succinate (TOPROL XL) 50 MG extended release tablet Take 75 mg by mouth daily      ipratropium-albuterol (DUONEB) 0.5-2.5 (3) MG/3ML SOLN nebulizer solution Inhale 3 mLs into the lungs 3 times daily USE 1 VIAL IN NEBULIZER EVERY 6 HOURS - for shortness of breath/wheezing 120 each 8    pantoprazole (PROTONIX) 40 MG tablet TAKE 1 TABLET DAILY 90 tablet 3    atorvastatin (LIPITOR) 40 MG tablet TAKE 1 TABLET EVERY EVENING 90 tablet 3    etanercept (ENBREL) 50 MG/ML injection Inject 50 mg into the skin once a week       Probiotic Product (ALIGN PO) Take by mouth daily      Omega-3 Fatty Acids (FISH OIL PO) Take by mouth      OXYGEN 2 lpm at rest and to sleep and 4 lpm to ambulate (Patient taking differently: 3 lpm at rest and to sleep and 4 lpm to ambulate) 1 Device 6    therapeutic multivitamin-minerals (THERAGRAN-M) tablet Take 1 tablet by mouth daily. Calcium Citrate-Vitamin D 200-200 MG-UNIT TABS Take 1 tablet by mouth 2 times daily       Coenzyme Q10 (COQ10) 100 MG CAPS Take 100 mg by mouth daily. aspirin 81 MG EC tablet Take 81 mg by mouth daily. folic acid (FOLVITE) 1 MG tablet Take 1 mg by mouth daily. No current facility-administered medications for this visit. Allergies   Allergen Reactions    Sulfa Antibiotics Nausea Only    Amoxicillin Itching and Rash     Health Maintenance   Topic Date Due    Colorectal Cancer Screen  10/21/2020    Annual Wellness Visit (AWV)  11/24/2022    Depression Screen  06/07/2023    Lipids  09/14/2023    DTaP/Tdap/Td vaccine (2 - Td or Tdap) 11/01/2026    Flu vaccine  Completed    Shingles vaccine  Completed    Pneumococcal 65+ years Vaccine  Completed    COVID-19 Vaccine  Completed    Hepatitis A vaccine  Aged Out    Hib vaccine  Aged Out    Meningococcal (ACWY) vaccine  Aged Out       Subjective:  Review of Systems  General:   No fever, no chills, some fatigue or weight loss  Pulmonary:    some dyspnea, no wheezing  Cardiac:    Denies recent chest pain,   GI:     No nausea or vomiting, no abdominal pain  Neuro:    No dizziness or light headedness,   Musculoskeletal:  No recent active issues  Extremities:   No edema, no obvious claudication     Objective:  Physical Exam  /64   Pulse 85   Ht 5' 2\" (1.575 m)   Wt 168 lb (76.2 kg)   BMI 30.73 kg/m²   General:   Well developed, well nourished  Lungs:   Clear to auscultation  Heart:    Normal S1 S2, Slight murmur. no rubs, no gallops  Abdomen:   Soft, non tender, no organomegalies, positive bowel sounds  Extremities:   No edema, no cyanosis, good peripheral pulses  Neurological:   Awake, alert, oriented. No obvious focal deficits  Musculoskelatal:  No obvious deformities    Assessment:      Diagnosis Orders   1. Pulmonary fibrosis ([address]        2. Primary hypertension        As above  Cardiac fair for now     Plan:  No follow-ups on file. As above  Continue risk factor modification and medical management  Thank you for allowing me to participate in the care of your patient.  Please don't hesitate to contact me regarding any further issues related to the patient care    Orders Placed:  No orders of the defined types were placed in this encounter. Medications Prescribed:  No orders of the defined types were placed in this encounter. Discussed use, benefit, and side effects of prescribed medications. All patient questions answered. Pt voicedunderstanding. Instructed to continue current medications, diet and exercise. Continue risk factor modification and medical management. Patient agreed with treatment plan. Follow up as directed.     Electronically signedby Janie Bob MD on 11/4/2022 at 12:59 PM

## 2022-11-08 RX ORDER — ATORVASTATIN CALCIUM 40 MG/1
TABLET, FILM COATED ORAL
Qty: 90 TABLET | Refills: 3 | Status: SHIPPED | OUTPATIENT
Start: 2022-11-08

## 2022-11-15 ENCOUNTER — TELEPHONE (OUTPATIENT)
Dept: FAMILY MEDICINE CLINIC | Age: 87
End: 2022-11-15

## 2022-11-15 RX ORDER — LOSARTAN POTASSIUM 50 MG/1
TABLET ORAL
Qty: 90 TABLET | Refills: 1 | Status: SHIPPED | OUTPATIENT
Start: 2022-11-15 | End: 2023-01-10

## 2022-12-06 RX ORDER — PANTOPRAZOLE SODIUM 40 MG/1
TABLET, DELAYED RELEASE ORAL
Qty: 90 TABLET | Refills: 1 | Status: SHIPPED | OUTPATIENT
Start: 2022-12-06

## 2022-12-06 NOTE — TELEPHONE ENCOUNTER
The pharmacy is requesting a refill of the below medication which has been pended for you:     Requested Prescriptions     Pending Prescriptions Disp Refills    pantoprazole (PROTONIX) 40 MG tablet [Pharmacy Med Name: PANTOPRAZOLE SODIUM DR TABS 40MG] 90 tablet 1     Sig: TAKE 1 TABLET DAILY       Last Appointment Date: 10/27/2022  Next Appointment Date: 12/27/2022    Allergies   Allergen Reactions    Sulfa Antibiotics Nausea Only    Amoxicillin Itching and Rash

## 2023-01-10 ENCOUNTER — OFFICE VISIT (OUTPATIENT)
Dept: FAMILY MEDICINE CLINIC | Age: 88
End: 2023-01-10

## 2023-01-10 VITALS
HEIGHT: 62 IN | SYSTOLIC BLOOD PRESSURE: 120 MMHG | HEART RATE: 76 BPM | BODY MASS INDEX: 29.7 KG/M2 | DIASTOLIC BLOOD PRESSURE: 70 MMHG | WEIGHT: 161.4 LBS | RESPIRATION RATE: 12 BRPM

## 2023-01-10 DIAGNOSIS — R35.0 URINE FREQUENCY: ICD-10-CM

## 2023-01-10 DIAGNOSIS — Z00.00 MEDICARE ANNUAL WELLNESS VISIT, SUBSEQUENT: Primary | ICD-10-CM

## 2023-01-10 DIAGNOSIS — J84.10 PULMONARY FIBROSIS (HCC): ICD-10-CM

## 2023-01-10 DIAGNOSIS — D84.9 IMMUNOCOMPROMISED (HCC): ICD-10-CM

## 2023-01-10 DIAGNOSIS — M05.10 RHEUMATOID LUNG DISEASE WITH RHEUMATOID ARTHRITIS (HCC): ICD-10-CM

## 2023-01-10 DIAGNOSIS — R82.71 BACTERIURIA: ICD-10-CM

## 2023-01-10 DIAGNOSIS — E11.69 TYPE 2 DIABETES MELLITUS WITH OTHER SPECIFIED COMPLICATION, UNSPECIFIED WHETHER LONG TERM INSULIN USE (HCC): ICD-10-CM

## 2023-01-10 LAB
BACTERIA URINE, POC: ABNORMAL
BILIRUBIN URINE: 17 MG/DL
BLOOD, URINE: POSITIVE
CASTS URINE, POC: ABNORMAL
CHP ED QC CHECK: ABNORMAL
CLARITY: ABNORMAL
COLOR: YELLOW
CRYSTALS URINE, POC: ABNORMAL
EPI CELLS URINE, POC: ABNORMAL
GLUCOSE BLD-MCNC: 212 MG/DL
GLUCOSE URINE: ABNORMAL
HBA1C MFR BLD: 5.6 %
KETONES, URINE: NEGATIVE
LEUKOCYTE EST, POC: POSITIVE
NITRITE, URINE: POSITIVE
PH UA: 6.5 (ref 4.5–8)
PROTEIN UA: NEGATIVE
RBC URINE, POC: ABNORMAL
SPECIFIC GRAVITY UA: 1.01 (ref 1–1.03)
UROBILINOGEN, URINE: ABNORMAL
WBC URINE, POC: ABNORMAL
YEAST URINE, POC: ABNORMAL

## 2023-01-10 RX ORDER — NITROFURANTOIN 25; 75 MG/1; MG/1
100 CAPSULE ORAL 2 TIMES DAILY
Qty: 10 CAPSULE | Refills: 0 | Status: SHIPPED | OUTPATIENT
Start: 2023-01-10 | End: 2023-01-12 | Stop reason: SINTOL

## 2023-01-10 RX ORDER — LOSARTAN POTASSIUM 50 MG/1
TABLET ORAL
Qty: 90 TABLET | Refills: 1 | Status: SHIPPED | OUTPATIENT
Start: 2023-01-10

## 2023-01-10 ASSESSMENT — PATIENT HEALTH QUESTIONNAIRE - PHQ9
3. TROUBLE FALLING OR STAYING ASLEEP: 0
2. FEELING DOWN, DEPRESSED OR HOPELESS: 0
9. THOUGHTS THAT YOU WOULD BE BETTER OFF DEAD, OR OF HURTING YOURSELF: 0
SUM OF ALL RESPONSES TO PHQ QUESTIONS 1-9: 0
1. LITTLE INTEREST OR PLEASURE IN DOING THINGS: 0
SUM OF ALL RESPONSES TO PHQ QUESTIONS 1-9: 0
SUM OF ALL RESPONSES TO PHQ QUESTIONS 1-9: 0
8. MOVING OR SPEAKING SO SLOWLY THAT OTHER PEOPLE COULD HAVE NOTICED. OR THE OPPOSITE, BEING SO FIGETY OR RESTLESS THAT YOU HAVE BEEN MOVING AROUND A LOT MORE THAN USUAL: 0
SUM OF ALL RESPONSES TO PHQ QUESTIONS 1-9: 0
4. FEELING TIRED OR HAVING LITTLE ENERGY: 0
6. FEELING BAD ABOUT YOURSELF - OR THAT YOU ARE A FAILURE OR HAVE LET YOURSELF OR YOUR FAMILY DOWN: 0
10. IF YOU CHECKED OFF ANY PROBLEMS, HOW DIFFICULT HAVE THESE PROBLEMS MADE IT FOR YOU TO DO YOUR WORK, TAKE CARE OF THINGS AT HOME, OR GET ALONG WITH OTHER PEOPLE: 0
SUM OF ALL RESPONSES TO PHQ9 QUESTIONS 1 & 2: 0
7. TROUBLE CONCENTRATING ON THINGS, SUCH AS READING THE NEWSPAPER OR WATCHING TELEVISION: 0
5. POOR APPETITE OR OVEREATING: 0

## 2023-01-10 ASSESSMENT — ENCOUNTER SYMPTOMS
SORE THROAT: 0
BACK PAIN: 0
NAUSEA: 0
CONSTIPATION: 0
TROUBLE SWALLOWING: 0
RHINORRHEA: 0
SINUS PRESSURE: 0
WHEEZING: 0
DIARRHEA: 0
VOICE CHANGE: 0
CHEST TIGHTNESS: 0
ABDOMINAL PAIN: 0
VOMITING: 0
COUGH: 0
SHORTNESS OF BREATH: 1

## 2023-01-10 ASSESSMENT — LIFESTYLE VARIABLES
HOW MANY STANDARD DRINKS CONTAINING ALCOHOL DO YOU HAVE ON A TYPICAL DAY: PATIENT DOES NOT DRINK
HOW OFTEN DO YOU HAVE A DRINK CONTAINING ALCOHOL: NEVER

## 2023-01-10 NOTE — PROGRESS NOTES
Medicare Annual Wellness Visit    Max Salamanca is here for Medicare AWV, Hypertension, Gastroesophageal Reflux, and Diabetes    Assessment & Plan   Medicare annual wellness visit, subsequent  Urine frequency  -     POC URINE with Microscopic  Type 2 diabetes mellitus with other specified complication, unspecified whether long term insulin use (HCC)  -     POCT glycosylated hemoglobin (Hb A1C)  -     POCT Glucose  Bacteriuria  -     Culture, Urine  Immunocompromised ([facility]  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 ([facility]  Pulmonary fibrosis ([facility]    Recommendations for Preventive Services Due: see orders and patient instructions/AVS.  Recommended screening schedule for the next 5-10 years is provided to the patient in written form: see Patient Instructions/AVS.     Return in 13 weeks (on 4/11/2023) for DM and HTN, Medicare Annual Wellness Visit in 1 year. Subjective   The following acute and/or chronic problems were also addressed today:  DM,   Back pain/urinary tract infection  Rheumatoid lung with rheumatoid arthritis  Pulmonary fibrosis  Chronic lung failure on oxygen    Patient's complete Health Risk Assessment and screening values have been reviewed and are found in Flowsheets. The following problems were reviewed today and where indicated follow up appointments were made and/or referrals ordered.     Positive Risk Factor Screenings with Interventions:    Fall Risk:  Do you feel unsteady or are you worried about falling? : (!) yes  2 or more falls in past year?: (!) yes  Fall with injury in past year?: no     Interventions:    Patient comments: Patient is doing well with a walker with a seat  Patient declines any further evaluation or treatment            General HRA Questions:  Select all that apply: (!) New or Increased Pain    Pain Interventions:  Patient declined any further interventions or treatment       Weight and Activity:  Physical Activity: Inactive    Days of Exercise per Week: 0 days Minutes of Exercise per Session: 0 min     On average, how many days per week do you engage in moderate to strenuous exercise (like a brisk walk)?: 0 days  Have you lost any weight without trying in the past 3 months?: No  Body mass index: (!) 29.52    Inactivity Interventions:  Patient declined any further interventions or treatment          ADL's:   Patient reports needing help with:  Select all that apply: (!) Housekeeping  Interventions:  Patient declined any further interventions or treatment              Objective   Vitals:    01/10/23 1224   BP: 120/70   Site: Right Upper Arm   Position: Sitting   Cuff Size: Medium Adult   Pulse: 76   Resp: 12   Weight: 161 lb 6.4 oz (73.2 kg)   Height: 5' 2\" (1.575 m)      Body mass index is 29.52 kg/m². Allergies   Allergen Reactions    Sulfa Antibiotics Nausea Only    Amoxicillin Itching and Rash     Prior to Visit Medications    Medication Sig Taking?  Authorizing Provider   nitrofurantoin, macrocrystal-monohydrate, (MACROBID) 100 MG capsule Take 1 capsule by mouth 2 times daily for 7 days Yes Maris Draper MD   pantoprazole (PROTONIX) 40 MG tablet TAKE 1 TABLET DAILY Yes Maris Draper MD   atorvastatin (LIPITOR) 40 MG tablet TAKE 1 TABLET EVERY EVENING Yes Joann Snyder MD   hydroCHLOROthiazide (MICROZIDE) 12.5 MG capsule take 1 capsule by mouth every morning Yes Maris Draper MD   meclizine (ANTIVERT) 25 MG tablet One  at night  for  one  week and one  every  6  hours as  needed  for dizziness Yes Bebeto Whitehead MD   metoprolol succinate (TOPROL XL) 50 MG extended release tablet Take 75 mg by mouth daily Yes Historical Provider, MD   ipratropium-albuterol (DUONEB) 0.5-2.5 (3) MG/3ML SOLN nebulizer solution Inhale 3 mLs into the lungs 3 times daily USE 1 VIAL IN NEBULIZER EVERY 6 HOURS - for shortness of breath/wheezing Yes PRISCILLA Resendez - CNP   etanercept (ENBREL) 50 MG/ML injection Inject 50 mg into the skin once a week  Yes Historical Provider, MD   Probiotic Product (ALIGN PO) Take by mouth daily Yes Historical Provider, MD   Omega-3 Fatty Acids (FISH OIL PO) Take by mouth Yes Historical Provider, MD   OXYGEN 2 lpm at rest and to sleep and 4 lpm to ambulate  Patient taking differently: 3 lpm at rest and to sleep and 4 lpm to ambulate Yes Hector Green MD   therapeutic multivitamin-minerals [facility]) tablet Take 1 tablet by mouth daily. Yes Historical Provider, MD   Calcium Citrate-Vitamin D 200-200 MG-UNIT TABS Take 1 tablet by mouth 2 times daily  Yes Historical Provider, MD   Coenzyme Q10 (COQ10) 100 MG CAPS Take 100 mg by mouth daily. Yes Historical Provider, MD   aspirin 81 MG EC tablet Take 81 mg by mouth daily. Yes Historical Provider, MD   folic acid (FOLVITE) 1 MG tablet Take 1 mg by mouth daily. Yes Historical Provider, MD   losartan (COZAAR) 50 MG tablet take 1 tablet by mouth once daily  Marni Cooper MD       [facility] (Including outside providers/suppliers regularly involved in providing care):   Patient Care Team:  Marni Cooper MD as PCP - General (Emergency Medicine)  Marni Cooper MD as PCP - [facility] Empaneled Provider  Hector Green MD as Consulting Physician (Pulmonary Disease)     Reviewed and updated this visit:  Tobacco  Allergies  Meds  Med Hx  Surg Hx  Soc Hx  Fam Hx                   Date: 1/10/2023    12/22/22 wrapping gift and when she bend over she went down forward, and  1 month ago she is unable to getup from the toilet bowl. She called the EMS and they help her. Left knee ( she had TKR) do not bent well    She uses a walker all the time      Ana Aguilar is a 80 y. o.female who presents today for:  Chief Complaint   Patient presents with    Medicare AWV    Hypertension    Gastroesophageal Reflux    Diabetes         HPI:     Hypertension  Associated symptoms include shortness of breath.  Pertinent negatives include no chest pain, headaches, neck pain or palpitations.   Gastroesophageal Reflux  She reports no abdominal pain, no chest pain, no coughing, no nausea, no sore throat or no wheezing. Pertinent negatives include no fatigue.   Diabetes  Pertinent negatives for hypoglycemia include no dizziness or headaches. Pertinent negatives for diabetes include no chest pain, no fatigue and no weakness.     CurrentHome Medications were reviewed and updated by this Provider  Current Outpatient Medications   Medication Sig Dispense Refill    nitrofurantoin, macrocrystal-monohydrate, (MACROBID) 100 MG capsule Take 1 capsule by mouth 2 times daily for 7 days 10 capsule 0    pantoprazole (PROTONIX) 40 MG tablet TAKE 1 TABLET DAILY 90 tablet 1    atorvastatin (LIPITOR) 40 MG tablet TAKE 1 TABLET EVERY EVENING 90 tablet 3    hydroCHLOROthiazide (MICROZIDE) 12.5 MG capsule take 1 capsule by mouth every morning 90 capsule 1    meclizine (ANTIVERT) 25 MG tablet One  at night  for  one  week and one  every  6  hours as  needed  for dizziness 30 tablet 0    metoprolol succinate (TOPROL XL) 50 MG extended release tablet Take 75 mg by mouth daily      ipratropium-albuterol (DUONEB) 0.5-2.5 (3) MG/3ML SOLN nebulizer solution Inhale 3 mLs into the lungs 3 times daily USE 1 VIAL IN NEBULIZER EVERY 6 HOURS - for shortness of breath/wheezing 120 each 8    etanercept (ENBREL) 50 MG/ML injection Inject 50 mg into the skin once a week       Probiotic Product (ALIGN PO) Take by mouth daily      Omega-3 Fatty Acids (FISH OIL PO) Take by mouth      OXYGEN 2 lpm at rest and to sleep and 4 lpm to ambulate (Patient taking differently: 3 lpm at rest and to sleep and 4 lpm to ambulate) 1 Device 6    therapeutic multivitamin-minerals (THERAGRAN-M) tablet Take 1 tablet by mouth daily.        Calcium Citrate-Vitamin D 200-200 MG-UNIT TABS Take 1 tablet by mouth 2 times daily       Coenzyme Q10 (COQ10) 100 MG CAPS Take 100 mg by mouth daily.        aspirin 81 MG EC tablet Take 81 mg by mouth daily.         folic acid (FOLVITE) 1 MG tablet Take 1 mg by mouth daily. losartan (COZAAR) 50 MG tablet take 1 tablet by mouth once daily 90 tablet 1     No current facility-administered medications for this visit. Subjective:      Review of Systems   Constitutional:  Negative for appetite change, chills, diaphoresis, fatigue and fever. HENT:  Negative for congestion, ear pain, postnasal drip, rhinorrhea, sinus pressure, sneezing, sore throat, trouble swallowing and voice change. Respiratory:  Positive for shortness of breath. Negative for cough, chest tightness and wheezing. Cardiovascular:  Negative for chest pain, palpitations and leg swelling. Gastrointestinal:  Negative for abdominal pain, constipation, diarrhea, nausea and vomiting. Musculoskeletal:  Negative for arthralgias, back pain, joint swelling, myalgias, neck pain and neck stiffness. Neurological:  Negative for dizziness, syncope, weakness, light-headedness, numbness and headaches. Objective:     /70 (Site: Right Upper Arm, Position: Sitting, Cuff Size: Medium Adult)   Pulse 76   Resp 12   Ht 5' 2\" (1.575 m)   Wt 161 lb 6.4 oz (73.2 kg)   BMI 29.52 kg/m²   BP Readings from Last 3 Encounters:   01/10/23 120/70   11/04/22 116/64   10/27/22 138/68     Wt Readings from Last 3 Encounters:   01/10/23 161 lb 6.4 oz (73.2 kg)   11/04/22 168 lb (76.2 kg)   10/27/22 168 lb 8 oz (76.4 kg)       Physical Exam  Vitals reviewed. Constitutional:       Appearance: She is well-developed. HENT:      Head: Normocephalic and atraumatic. Right Ear: External ear normal.      Left Ear: External ear normal.      Nose: Nose normal.   Eyes:      General: No scleral icterus. Conjunctiva/sclera: Conjunctivae normal.      Pupils: Pupils are equal, round, and reactive to light. Neck:      Thyroid: No thyromegaly. Vascular: No JVD. Cardiovascular:      Rate and Rhythm: Normal rate and regular rhythm.       Heart sounds: No murmur heard.    No friction rub. Pulmonary:      Effort: Pulmonary effort is normal.      Breath sounds: Rhonchi and rales present. No wheezing. Chest:      Chest wall: No tenderness. Abdominal:      General: Bowel sounds are normal.      Palpations: Abdomen is soft. There is no mass. Tenderness: There is no abdominal tenderness. Musculoskeletal:      Cervical back: Normal range of motion and neck supple. Lymphadenopathy:      Cervical: No cervical adenopathy. Skin:     Findings: No rash. Neurological:      Mental Status: She is alert and oriented to person, place, and time. Psychiatric:         Behavior: Behavior is cooperative. Assessment:         Diagnosis Orders   1. Medicare annual wellness visit, subsequent        2. Urine frequency  POC URINE with Microscopic      3. Type 2 diabetes mellitus with other specified complication, unspecified whether long term insulin use (HCC)  POCT glycosylated hemoglobin (Hb A1C)    POCT Glucose      4. Bacteriuria  Culture, Urine      5. Immunocompromised ([facility]        6. 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 ([facility]        7. Pulmonary fibrosis (HCC)            :      Medications Prescribed:  Orders Placed This Encounter   Medications    nitrofurantoin, macrocrystal-monohydrate, (MACROBID) 100 MG capsule     Sig: Take 1 capsule by mouth 2 times daily for 7 days     Dispense:  10 capsule     Refill:  0       Orders Placed:  Orders Placed This Encounter   Procedures    Culture, Urine     Order Specific Question:   Specify (ex-cath, midstream, cysto, etc)? Answer:   mid stream    POC URINE with Microscopic    POCT glycosylated hemoglobin (Hb A1C)    POCT Glucose       Lab Results   Component Value Date    LABA1C 5.6 01/10/2023    LABA1C 6.0 09/20/2022    LABA1C 6.0 06/07/2022     Lab Results   Component Value Date    LDLCALC 57 09/14/2022    CREATININE 0.93 09/14/2022       Continue blood sugar check 1 times a day.       Return in 13 weeks (on 4/11/2023) for DM and HTN, Medicare Annual Wellness Visit in 1 year. Discussed use, benefit, and side effects of prescribed medications, home medications were reviewed and updated today with the patient. All patient questions answered. Pt voiced understanding. Instructedto continue current medications, diet and exercise. Patient agreed with treatmentplan. Allergies, Problem List, Medications, Medical History, Family History, Surgical History and Tobacco History reviewed by provider.

## 2023-01-10 NOTE — TELEPHONE ENCOUNTER
Date of last visit:  10/27/2022  Date of next visit:  1/10/2023    Requested Prescriptions     Pending Prescriptions Disp Refills    losartan (COZAAR) 50 MG tablet [Pharmacy Med Name: LOSARTAN POTASSIUM 50 MG TAB] 30 tablet      Sig: take 1 tablet by mouth once daily

## 2023-01-10 NOTE — PATIENT INSTRUCTIONS
Preventing Falls: Care Instructions  Overview     Getting around your home safely can be a challenge if you have injuries or health problems that make it easy for you to fall. Loose rugs and furniture in walkways are among the dangers for many older people who have problems walking or who have poor eyesight. People who have conditions such as arthritis, osteoporosis, or dementia also have to be careful not to fall. You can make your home safer with a few simple measures. Follow-up care is a key part of your treatment and safety. Be sure to make and go to all appointments, and call your doctor if you are having problems. It's also a good idea to know your test results and keep a list of the medicines you take. How can you care for yourself at home? Taking care of yourself  Exercise regularly to improve your strength, muscle tone, and balance. Walk if you can. Swimming may be a good choice if you cannot walk easily. Have your vision and hearing checked each year or any time you notice a change. If you have trouble seeing and hearing, you might not be able to avoid objects and could lose your balance. Know the side effects of the medicines you take. Ask your doctor or pharmacist whether the medicines you take can affect your balance. Sleeping pills or sedatives can affect your balance. Limit the amount of alcohol you drink. Alcohol can impair your balance and other senses. Ask your doctor whether calluses or corns on your feet need to be removed. If you wear loose-fitting shoes because of calluses or corns, you can lose your balance and fall. Talk to your doctor if you have numbness in your feet. You may get dizzy if you do not drink enough water. To prevent dehydration, drink plenty of fluids. Choose water and other clear liquids. If you have kidney, heart, or liver disease and have to limit fluids, talk with your doctor before you increase the amount of fluids you drink.   Preventing falls at home  Remove raised doorway thresholds, throw rugs, and clutter. Repair loose carpet or raised areas in the floor. Move furniture and electrical cords to keep them out of walking paths. Use nonskid floor wax, and wipe up spills right away, especially on ceramic tile floors. If you use a walker or cane, put rubber tips on it. If you use crutches, clean the bottoms of them regularly with an abrasive pad, such as steel wool. Keep your house well lit, especially stairways, porches, and outside walkways. Use night-lights in areas such as hallways and bathrooms. Add extra light switches or use remote switches (such as switches that go on or off when you clap your hands) to make it easier to turn lights on if you have to get up during the night. Install sturdy handrails on stairways. Move items in your cabinets so that the things you use a lot are on the lower shelves (about waist level). Keep a cordless phone and a flashlight with new batteries by your bed. If possible, put a phone in each of the main rooms of your house, or carry a cell phone in case you fall and cannot reach a phone. Or, you can wear a device around your neck or wrist. You push a button that sends a signal for help. Wear low-heeled shoes that fit well and give your feet good support. Use footwear with nonskid soles. Check the heels and soles of your shoes for wear. Repair or replace worn heels or soles. Do not wear socks without shoes on smooth floors, such as wood. Walk on the grass when the sidewalks are slippery. If you live in an area that gets snow and ice in the winter, sprinkle salt on slippery steps and sidewalks. Or ask a family member or friend to do this for you. Preventing falls in the bath  Install grab bars and nonskid mats inside and outside your shower or tub and near the toilet and sinks. Use shower chairs and bath benches. Use a hand-held shower head that will allow you to sit while showering.   Get into a tub or shower by putting the weaker leg in first. Get out of a tub or shower with your strong side first.  Repair loose toilet seats and consider installing a raised toilet seat to make getting on and off the toilet easier. Keep your bathroom door unlocked while you are in the shower. Where can you learn more? Go to http://www.be.com/ and enter G117 to learn more about \"Preventing Falls: Care Instructions. \"  Current as of: May 4, 2022               Content Version: 13.5  © 7553-5503 Healthwise, [email or web address]. Care instructions adapted under license by [facility] ([facility]). If you have questions about a medical condition or this instruction, always ask your healthcare professional. [address] any warranty or liability for your use of this information. Learning About Being Active as an Older Adult  Why is being active important as you get older? Being active is one of the best things you can do for your health. And it's never too late to start. Being active--or getting active, if you aren't already--has definite benefits. It can:  Give you more energy,  Keep your mind sharp. Improve balance to reduce your risk of falls. Help you manage chronic illness with fewer medicines. No matter how old you are, how fit you are, or what health problems you have, there is a form of activity that will work for you. And the more physical activity you can do, the better your overall health will be. What kinds of activity can help you stay healthy? Being more active will make your daily activities easier. Physical activity includes planned exercise and things you do in daily life. There are four types of activity:  Aerobic. Doing aerobic activity makes your heart and lungs strong. Includes walking, dancing, and gardening. Aim for at least 2½ hours spread throughout the week. It improves your energy and can help you sleep better. Muscle-strengthening.   This type of activity can help maintain muscle and strengthen bones. Includes climbing stairs, using resistance bands, and lifting or carrying heavy loads. Aim for at least twice a week. It can help protect the knees and other joints. Stretching. Stretching gives you better range of motion in joints and muscles. Includes upper arm stretches, calf stretches, and gentle yoga. Aim for at least twice a week, preferably after your muscles are warmed up from other activities. It can help you function better in daily life. Balancing. This helps you stay coordinated and have good posture. Includes heel-to-toe walking, cuba chi, and certain types of yoga. Aim for at least 3 days a week. It can reduce your risk of falling. Even if you have a hard time meeting the recommendations, it's better to be more active than less active. All activity done in each category counts toward your weekly total. You'd be surprised how daily things like carrying groceries, keeping up with grandchildren, and taking the stairs can add up. What keeps you from being active? If you've had a hard time being more active, you're not alone. Maybe you remember being able to do more. Or maybe you've never thought of yourself as being active. It's frustrating when you can't do the things you want. Being more active can help. What's holding you back? Getting started. Have a goal, but break it into easy tasks. Small steps build into big accomplishments. Staying motivated. If you feel like skipping your activity, remember your goal. Maybe you want to move better and stay independent. Every activity gets you one step closer. Not feeling your best.  Start with 5 minutes of an activity you enjoy. Prove to yourself you can do it. As you get comfortable, increase your time. You may not be where you want to be. But you're in the process of getting there. Everyone starts somewhere. How can you find safe ways to stay active?   Talk with your doctor about any physical challenges you're facing. Make a plan with your doctor if you have a health problem or aren't sure how to get started with activity. If you're already active, ask your doctor if there is anything you should change to stay safe as your body and health change. If you tend to feel dizzy after you take medicine, avoid activity at that time. Try being active before you take your medicine. This will reduce your risk of falls. If you plan to be active at home, make sure to clear your space before you get started. Remove things like TV cords, coffee tables, and throw rugs. It's safest to have plenty of space to move freely. The key to getting more active is to take it slow and steady. Try to improve only a little bit at a time. Pick just one area to improve on at first. And if an activity hurts, stop and talk to your doctor. Where can you learn more? Go to http://[employer].be.com/ and enter P600 to learn more about \"Learning About Being Active as an Older Adult. \"  Current as of: October 10, 2022               Content Version: 13.5  © 0518-9054 Healthwise, [employer]. Care instructions adapted under license by [facility] ([facility]). If you have questions about a medical condition or this instruction, always ask your healthcare professional. [address] any warranty or liability for your use of this information. Hearing Loss: Care Instructions  Overview     Hearing loss is a sudden or slow decrease in how well you hear. It can range from mild to severe. Permanent hearing loss can occur with aging. It also can happen when you are exposed long-term to loud noise. Examples include listening to loud music, riding motorcycles, or being around other loud machines. Hearing loss can affect your work and home life. It can make you feel lonely or depressed. You may feel that you have lost your independence. But hearing aids and other devices can help you hear better and feel connected to others.   Follow-up care is a key part of your treatment and safety. Be sure to make and go to all appointments, and call your doctor if you are having problems. It's also a good idea to know your test results and keep a list of the medicines you take. How can you care for yourself at home? Avoid loud noises whenever possible. This helps keep your hearing from getting worse. Always wear hearing protection around loud noises. Wear a hearing aid as directed. See a professional who can help you pick a hearing aid that fits you. Have hearing tests as your doctor suggests. They can show whether your hearing has changed. Your hearing aid may need to be adjusted. Use other devices as needed. These may include:  Telephone amplifiers and hearing aids that can connect to a television, stereo, radio, or microphone. Devices that use lights or vibrations. These alert you to the doorbell, a ringing telephone, or a baby monitor. Television closed-captioning. This shows the words at the bottom of the screen. Most new TVs can do this. TTY (text telephone). This lets you type messages back and forth on the telephone instead of talking or listening. These devices are also called TDD. When messages are typed on the keyboard, they are sent over the phone line to a receiving TTY. The message is shown on a monitor. Use text messaging, social media, and email if it is hard for you to communicate by telephone. Try to learn a listening technique called speechreading. It is not lipreading. You pay attention to people's gestures, expressions, posture, and tone of voice. These clues can help you understand what a person is saying. Face the person you are talking to, and have them face you. Make sure the lighting is good. You need to see the other person's face clearly. Think about counseling if you need help to adjust to your hearing loss. When should you call for help?   Watch closely for changes in your health, and be sure to contact your doctor if:    You think your hearing is getting worse.     You have new symptoms, such as dizziness or nausea. Where can you learn more? Go to http://www.be.com/ and enter R798 to learn more about \"Hearing Loss: Care Instructions. \"  Current as of: May 4, 2022               Content Version: 13.5  © 2006-2022 [employer]. Care instructions adapted under license by [facility] [place] ([facility]). If you have questions about a medical condition or this instruction, always ask your healthcare professional. [address] any warranty or liability for your use of this information. Learning About Activities of Daily Living  What are activities of daily living? Activities of daily living (ADLs) are the basic self-care tasks you do every day. As you age, and if you have health problems, you may find that it's harder to do these things for yourself. That's when you may need some help. Your doctor uses ADLs to measure how much help you need. Knowing what you can and can't do for yourself is an important first step to getting help. And when you have the help you need, you can stay as independent as possible. Your doctor will want to know if you are able to do tasks such as: Take a bath or shower without help. Go to the bathroom by yourself. Dress and undress without help. Shave, comb your hair, and brush teeth on your own. Get in and out of bed or a chair without help. Feed yourself without help. If you are having trouble doing basic self-care tasks, talk with your doctor. You may want to bring a caregiver or family member who can help the doctor understand your needs and abilities. How will a doctor assess your ADLs? Asking about ADLs is part of a routine health checkup your doctor will likely do as you age.  Your health check might be done in a doctor's office, in your home, or at a hospital. The goal is to find out if you are having any problems that could make your health problems worse or that make it unsafe for you to be on your own.  To measure your ADLs, your doctor will ask how hard it is for you to do routine tasks. He or she may also want to know if you have changed the way you do a task because of a health problem. He or she may watch how you:  Walk back and forth.  Keep your balance while you stand or walk.  Move from sitting to standing or from a bed to a chair.  Button or unbutton a shirt or sweater.  Remove and put on your shoes.  It's normal to feel a little worried or anxious if you find you can't do all the things you used to be able to do. Talking with your doctor about ADLs isn't a test that you either pass or fail. It's just a way to get more information about your health and safety.  Follow-up care is a key part of your treatment and safety. Be sure to make and go to all appointments, and call your doctor if you are having problems. It's also a good idea to know your test results and keep a list of the medicines you take.  Current as of: October 6, 2021               Content Version: 13.5  © 2006-2022 [employer].   Care instructions adapted under license by [employer]. If you have questions about a medical condition or this instruction, always ask your healthcare professional. [employer] disclaims any warranty or liability for your use of this information.           Advance Directives: Care Instructions  Overview  An advance directive is a legal way to state your wishes at the end of your life. It tells your family and your doctor what to do if you can't say what you want.  There are two main types of advance directives. You can change them any time your wishes change.  Living will.  This form tells your family and your doctor your wishes about life support and other treatment. The form is also called a declaration.  Medical power of .  This form lets you name a person to make treatment decisions for you when you can't  speak for yourself. This person is called a health care agent (health care proxy, health care surrogate). The form is also called a durable power of  for health care. If you do not have an advance directive, decisions about your medical care may be made by a family member, or by a doctor or a  who doesn't know you. It may help to think of an advance directive as a gift to the people who care for you. If you have one, they won't have to make tough decisions by themselves. For more information, including forms for your state, see the [address] website (www.caringinfo.org/planning/advance-directives/). Follow-up care is a key part of your treatment and safety. Be sure to make and go to all appointments, and call your doctor if you are having problems. It's also a good idea to know your test results and keep a list of the medicines you take. What should you include in an advance directive? Many states have a unique advance directive form. (It may ask you to address specific issues.) Or you might use a universal form that's approved by many states. If your form doesn't tell you what to address, it may be hard to know what to include in your advance directive. Use the questions below to help you get started. Who do you want to make decisions about your medical care if you are not able to? What life-support measures do you want if you have a serious illness that gets worse over time or can't be cured? What are you most afraid of that might happen? (Maybe you're afraid of having pain, losing your independence, or being kept alive by machines.)  Where would you prefer to die? (Your home? A hospital? A nursing home?)  Do you want to donate your organs when you die? Do you want certain Christianity practices performed before you die? When should you call for help? Be sure to contact your doctor if you have any questions. Where can you learn more?   Go to http://www.be.com/ and enter R264 to learn more about \"Advance Directives: Care Instructions. \"  Current as of: June 16, 2022               Content Version: 13.5  © 2006-2022 Healthwise, [employer]. Care instructions adapted under license by [facility] ([facility]). If you have questions about a medical condition or this instruction, always ask your healthcare professional. [address] any warranty or liability for your use of this information. Personalized Preventive Plan for Kisha Gusman - 1/10/2023  Medicare offers a range of preventive health benefits. Some of the tests and screenings are paid in full while other may be subject to a deductible, co-insurance, and/or copay. Some of these benefits include a comprehensive review of your medical history including lifestyle, illnesses that may run in your family, and various assessments and screenings as appropriate. After reviewing your medical record and screening and assessments performed today your provider may have ordered immunizations, labs, imaging, and/or referrals for you. A list of these orders (if applicable) as well as your Preventive Care list are included within your After Visit Summary for your review. Other Preventive Recommendations:    A preventive eye exam performed by an eye specialist is recommended every 1-2 years to screen for glaucoma; cataracts, macular degeneration, and other eye disorders. A preventive dental visit is recommended every 6 months. Try to get at least 150 minutes of exercise per week or 10,000 steps per day on a pedometer . Order or download the FREE \"Exercise & Physical Activity: Your Everyday Guide\" from [employer]. Call 3-484.473.2495 or search [employer] online. You need 2461-9627 mg of calcium and 3506-9911 IU of vitamin D per day.  It is possible to meet your calcium requirement with diet alone, but a vitamin D supplement is usually necessary to meet this goal.  When exposed to the sun, use a sunscreen that protects against both UVA and UVB radiation with an SPF of 30 or greater. Reapply every 2 to 3 hours or after sweating, drying off with a towel, or swimming. Always wear a seat belt when traveling in a car. Always wear a helmet when riding a bicycle or motorcycle.

## 2023-01-11 ENCOUNTER — TELEPHONE (OUTPATIENT)
Dept: FAMILY MEDICINE CLINIC | Age: 88
End: 2023-01-11

## 2023-01-11 ENCOUNTER — HOSPITAL ENCOUNTER (EMERGENCY)
Age: 88
Discharge: HOME | End: 2023-01-11
Attending: EMERGENCY MEDICINE
Payer: MEDICARE

## 2023-01-11 VITALS
HEIGHT: 62 IN | BODY MASS INDEX: 29.63 KG/M2 | DIASTOLIC BLOOD PRESSURE: 74 MMHG | RESPIRATION RATE: 17 BRPM | WEIGHT: 161 LBS | OXYGEN SATURATION: 99 % | HEART RATE: 88 BPM | TEMPERATURE: 98.9 F | SYSTOLIC BLOOD PRESSURE: 136 MMHG

## 2023-01-11 DIAGNOSIS — T50.905A MEDICATION REACTION, INITIAL ENCOUNTER: ICD-10-CM

## 2023-01-11 DIAGNOSIS — R42 DIZZINESS: ICD-10-CM

## 2023-01-11 DIAGNOSIS — R11.0 NAUSEA: Primary | ICD-10-CM

## 2023-01-11 LAB
ALBUMIN SERPL-MCNC: 3.7 G/DL (ref 3.5–5.1)
ALP BLD-CCNC: 73 U/L (ref 38–126)
ALT SERPL-CCNC: 13 U/L (ref 11–66)
ANION GAP SERPL CALCULATED.3IONS-SCNC: 9 MEQ/L (ref 8–16)
AST SERPL-CCNC: 23 U/L (ref 5–40)
BACTERIA: ABNORMAL /HPF
BASOPHILS # BLD: 0.7 %
BASOPHILS ABSOLUTE: 0 THOU/MM3 (ref 0–0.1)
BILIRUB SERPL-MCNC: 0.8 MG/DL (ref 0.3–1.2)
BILIRUBIN DIRECT: < 0.2 MG/DL (ref 0–0.3)
BILIRUBIN URINE: NEGATIVE
BLOOD, URINE: NEGATIVE
BUN BLDV-MCNC: 18 MG/DL (ref 7–22)
CALCIUM SERPL-MCNC: 9.1 MG/DL (ref 8.5–10.5)
CASTS 2: ABNORMAL /LPF
CASTS UA: ABNORMAL /LPF
CHARACTER, URINE: CLEAR
CHLORIDE BLD-SCNC: 98 MEQ/L (ref 98–111)
CO2: 30 MEQ/L (ref 23–33)
COLOR: YELLOW
CREAT SERPL-MCNC: 0.8 MG/DL (ref 0.4–1.2)
CRYSTALS, UA: ABNORMAL
EOSINOPHIL # BLD: 1.4 %
EOSINOPHILS ABSOLUTE: 0.1 THOU/MM3 (ref 0–0.4)
EPITHELIAL CELLS, UA: ABNORMAL /HPF
ERYTHROCYTE [DISTWIDTH] IN BLOOD BY AUTOMATED COUNT: 14 % (ref 11.5–14.5)
ERYTHROCYTE [DISTWIDTH] IN BLOOD BY AUTOMATED COUNT: 47.5 FL (ref 35–45)
GFR SERPL CREATININE-BSD FRML MDRD: > 60 ML/MIN/1.73M2
GLUCOSE BLD-MCNC: 158 MG/DL (ref 70–108)
GLUCOSE URINE: NEGATIVE MG/DL
HCT VFR BLD CALC: 35.4 % (ref 37–47)
HEMOGLOBIN: 11.3 GM/DL (ref 12–16)
IMMATURE GRANS (ABS): 0.03 THOU/MM3 (ref 0–0.07)
IMMATURE GRANULOCYTES: 0.5 %
INFLUENZA A: NOT DETECTED
INFLUENZA B: NOT DETECTED
KETONES, URINE: 15
LEUKOCYTE ESTERASE, URINE: NEGATIVE
LYMPHOCYTES # BLD: 2.5 %
LYMPHOCYTES ABSOLUTE: 0.1 THOU/MM3 (ref 1–4.8)
MCH RBC QN AUTO: 29.9 PG (ref 26–33)
MCHC RBC AUTO-ENTMCNC: 31.9 GM/DL (ref 32.2–35.5)
MCV RBC AUTO: 93.7 FL (ref 81–99)
MISCELLANEOUS 2: ABNORMAL
MONOCYTES # BLD: 5.3 %
MONOCYTES ABSOLUTE: 0.3 THOU/MM3 (ref 0.4–1.3)
NITRITE, URINE: NEGATIVE
NUCLEATED RED BLOOD CELLS: 0 /100 WBC
OSMOLALITY CALCULATION: 279 MOSMOL/KG (ref 275–300)
PH UA: 7.5 (ref 5–9)
PLATELET # BLD: 115 THOU/MM3 (ref 130–400)
PMV BLD AUTO: 9.4 FL (ref 9.4–12.4)
POTASSIUM SERPL-SCNC: 3.7 MEQ/L (ref 3.5–5.2)
PROTEIN UA: 30
RBC # BLD: 3.78 MILL/MM3 (ref 4.2–5.4)
RBC URINE: ABNORMAL /HPF
RENAL EPITHELIAL, UA: ABNORMAL
SARS-COV-2 RNA, RT PCR: NOT DETECTED
SEG NEUTROPHILS: 89.6 %
SEGMENTED NEUTROPHILS ABSOLUTE COUNT: 5.1 THOU/MM3 (ref 1.8–7.7)
SODIUM BLD-SCNC: 137 MEQ/L (ref 135–145)
SPECIFIC GRAVITY, URINE: 1.02 (ref 1–1.03)
TOTAL PROTEIN: 7.1 G/DL (ref 6.1–8)
TROPONIN T: < 0.01 NG/ML
UROBILINOGEN, URINE: 1 EU/DL (ref 0–1)
WBC # BLD: 5.7 THOU/MM3 (ref 4.8–10.8)
WBC UA: ABNORMAL /HPF
YEAST: ABNORMAL

## 2023-01-11 PROCEDURE — 80053 COMPREHEN METABOLIC PANEL: CPT

## 2023-01-11 PROCEDURE — 93005 ELECTROCARDIOGRAM TRACING: CPT | Performed by: EMERGENCY MEDICINE

## 2023-01-11 PROCEDURE — 84484 ASSAY OF TROPONIN QUANT: CPT

## 2023-01-11 PROCEDURE — 87636 SARSCOV2 & INF A&B AMP PRB: CPT

## 2023-01-11 PROCEDURE — 99284 EMERGENCY DEPT VISIT MOD MDM: CPT

## 2023-01-11 PROCEDURE — 36415 COLL VENOUS BLD VENIPUNCTURE: CPT

## 2023-01-11 PROCEDURE — 2580000003 HC RX 258: Performed by: EMERGENCY MEDICINE

## 2023-01-11 PROCEDURE — 82248 BILIRUBIN DIRECT: CPT

## 2023-01-11 PROCEDURE — 85025 COMPLETE CBC W/AUTO DIFF WBC: CPT

## 2023-01-11 PROCEDURE — 81001 URINALYSIS AUTO W/SCOPE: CPT

## 2023-01-11 RX ORDER — ONDANSETRON 4 MG/1
4 TABLET, ORALLY DISINTEGRATING ORAL 3 TIMES DAILY PRN
Qty: 15 TABLET | Refills: 0 | Status: SHIPPED | OUTPATIENT
Start: 2023-01-11

## 2023-01-11 RX ORDER — SODIUM CHLORIDE 9 MG/ML
INJECTION, SOLUTION INTRAVENOUS CONTINUOUS
Status: DISCONTINUED | OUTPATIENT
Start: 2023-01-11 | End: 2023-01-11 | Stop reason: HOSPADM

## 2023-01-11 RX ADMIN — SODIUM CHLORIDE: 9 INJECTION, SOLUTION INTRAVENOUS at 19:22

## 2023-01-11 ASSESSMENT — PAIN - FUNCTIONAL ASSESSMENT
PAIN_FUNCTIONAL_ASSESSMENT: NONE - DENIES PAIN

## 2023-01-11 NOTE — ED PROVIDER NOTES
[address] [facility] [address] [address] EMERGENCY DEPT      EMERGENCY MEDICINE     Room # 01/001A    Pt Name: Manish Brock  MRN: [id number]  [place] 1935  Date of evaluation: 1/11/2023  Provider: Gifford Scheuermann, MD    CHIEF COMPLAINT       Chief Complaint   Patient presents with    Medication Reaction    Dizziness     HISTORY OF PRESENT ILLNESS   Manish Brock is a pleasant 80 y.o. female who presents to the emergency department from from home, as a walk in to the ED lobby for evaluation of Acacian reaction. The patient was seen by the primary care physician yesterday and was started on Macrobid for an apparent urinary tract infection. The patient started the yesterday last night and early this morning the patient was feeling nauseous and has not been eating the whole day today she feels lightheaded and dizzy with GI upset. Did not have any vomiting no fever no chills no sore throat no cough no diarrhea or constipation. Denies any flu symptoms.   Patient had a history of pulmonary fibrosis and uses 3 L nasal cannula all the time    PASTMEDICAL HISTORY     Past Medical History:   Diagnosis Date    Acid reflux     Anxiety     Arrhythmia     Arthritis     Breast cancer ([facility] [address] 11/1998    left    Bronchiectasis ([address] 09/30/2015    CAD (coronary artery disease)     NON-OBSTRUCTIVE    Cancer ([address]     breast    Diabetes mellitus ([address]     Heart palpitations     HX OF:    History of therapeutic radiation 1998    left breast cancer    Hyperlipidemia     Hypertension     ILD (interstitial lung disease) ([address] 09/30/2015    w/ Bronchectasis    Osteoarthritis     rhuematoid    Rheumatoid arthritis ([address]     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       Patient Active Problem List   Diagnosis Code    CAD (coronary artery disease) I25.10    Hyperlipidemia E78.5    Diabetes mellitus E11.9    Anxiety F41.9    Hypoxia R09.02    Immunocompromised ([address] D84.9    Rheumatoid lung ([address] M05.10    Essential hypertension I10 Gastroesophageal reflux disease K21.9    Rheumatoid arthritis ([facility] [address] M06.9    Pulmonary fibrosis (HCC) J84.10    Chronic respiratory failure (HCC) J96.10    Carcinoma in situ, breast, ductal D05.10    Menopause present Z78.0    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 ([facility] [address] M05.10    Bronchiectasis ([facility] [address] J47.9    Morbidly obese ([facility] [address] E66.01     SURGICAL HISTORY       Past Surgical History:   Procedure Laterality Date    ABDOMINAL HERNIA REPAIR  9/2013    BREAST LUMPECTOMY  12/10/1998    CARDIOVASCULAR STRESS TEST  06/17/2010    EF 71%    CARDIOVASCULAR STRESS TEST  01/28/2008    EF 60%    CARDIOVASCULAR STRESS TEST  01/22/2005    CATARACT REMOVAL Bilateral     Dr Wilian Alvarez - 6/27/17 right, 7/10/17 left    CATARACT REMOVAL      CHOLECYSTECTOMY  09/20/2008    [facility]     COLONOSCOPY      HAMMER TOE SURGERY Left 09/2018    HERNIA REPAIR  07/01/2010    [facility]    HIATAL HERNIA REPAIR  09/15/2009    [facility]    JOINT REPLACEMENT  5/29/02    left knee    TONSILLECTOMY AND ADENOIDECTOMY  1945    TOTAL KNEE ARTHROPLASTY Left 2002    TRANSTHORACIC ECHOCARDIOGRAM  01/28/2005    EF 70%    UPPER GASTROINTESTINAL ENDOSCOPY  03/21/2017       CURRENT MEDICATIONS       Discharge Medication List as of 1/11/2023  8:38 PM        CONTINUE these medications which have NOT CHANGED    Details   losartan (COZAAR) 50 MG tablet take 1 tablet by mouth once daily, Disp-90 tablet, R-1Normal      nitrofurantoin, macrocrystal-monohydrate, (MACROBID) 100 MG capsule Take 1 capsule by mouth 2 times daily for 7 days, Disp-10 capsule, R-0Normal      pantoprazole (PROTONIX) 40 MG tablet TAKE 1 TABLET DAILY, Disp-90 tablet, R-1Normal      atorvastatin (LIPITOR) 40 MG tablet TAKE 1 TABLET EVERY EVENING, Disp-90 tablet, R-3Normal      hydroCHLOROthiazide (MICROZIDE) 12.5 MG capsule take 1 capsule by mouth every morning, Disp-90 capsule, R-1Normal      meclizine (ANTIVERT) 25 MG tablet One  at night  for  one  week and one  every  6 hours as  needed  for dizziness, Disp-30 tablet, R-0Normal      metoprolol succinate (TOPROL XL) 50 MG extended release tablet Take 75 mg by mouth dailyHistorical Med      ipratropium-albuterol (DUONEB) 0.5-2.5 (3) MG/3ML SOLN nebulizer solution Inhale 3 mLs into the lungs 3 times daily USE 1 VIAL IN NEBULIZER EVERY 6 HOURS - for shortness of breath/wheezing, Disp-120 each, R-8Normal      etanercept (ENBREL) 50 MG/ML injection Inject 50 mg into the skin once a week Historical Med      Probiotic Product (ALIGN PO) Take by mouth dailyHistorical Med      Omega-3 Fatty Acids (FISH OIL PO) Take by mouth      OXYGEN 2 lpm at rest and to sleep and 4 lpm to ambulate, Disp-1 Device, R-6      therapeutic multivitamin-minerals (THERAGRAN-M) tablet Take 1 tablet by mouth daily. Calcium Citrate-Vitamin D 200-200 MG-UNIT TABS Take 1 tablet by mouth 2 times daily Historical Med      Coenzyme Q10 (COQ10) 100 MG CAPS Take 100 mg by mouth daily. aspirin 81 MG EC tablet Take 81 mg by mouth daily. folic acid (FOLVITE) 1 MG tablet Take 1 mg by mouth daily. ALLERGIES     is allergic to sulfa antibiotics and amoxicillin. FAMILY HISTORY     She indicated that her mother is . She indicated that her father is . She indicated that the status of her daughter is unknown. She indicated that the status of her maternal aunt is unknown. She indicated that the status of her paternal aunt is unknown. SOCIAL HISTORY       Social History     Tobacco Use    Smoking status: Never    Smokeless tobacco: Never   Vaping Use    Vaping Use: Never used   Substance Use Topics    Alcohol use:  Yes     Alcohol/week: 0.0 standard drinks     Comment: RARE    Drug use: No       PHYSICAL EXAM       ED Triage Vitals [238]   BP Temp Temp Source Heart Rate Resp SpO2 Height Weight   (!) 124/59 99 °F (37.2 °C) Oral 89 18 95 % 5' 2\" (1.575 m) 161 lb (73 kg)       Additional Vital Signs:  /74   Pulse 88   Temp 98.9 °F (37.2 °C) (Oral)   Resp 17   Ht 5' 2\" (1.575 m)   Wt 161 lb (73 kg)   SpO2 99%   BMI 29.45 kg/m² Estimated body mass index is 29.45 kg/m² as calculated from the following:    Height as of this encounter: 5' 2\" (1.575 m). Weight as of this encounter: 161 lb (73 kg). Physical Exam  Vitals reviewed. Constitutional:       Appearance: She is well-developed. HENT:      Head: Normocephalic and atraumatic. Right Ear: External ear normal.      Left Ear: External ear normal.      Nose: Nose normal.   Eyes:      General: No scleral icterus. Conjunctiva/sclera: Conjunctivae normal.      Pupils: Pupils are equal, round, and reactive to light. Neck:      Thyroid: No thyromegaly. Vascular: No JVD. Cardiovascular:      Rate and Rhythm: Normal rate and regular rhythm. Heart sounds: No murmur heard. No friction rub. Pulmonary:      Effort: Pulmonary effort is normal.      Breath sounds: Normal breath sounds. No wheezing or rales. Comments: There are fine crackles secondary to fibrosis in both lungs  Chest:      Chest wall: No tenderness. Abdominal:      General: Bowel sounds are normal.      Palpations: Abdomen is soft. There is no mass. Tenderness: There is no abdominal tenderness. Musculoskeletal:      Cervical back: Normal range of motion and neck supple. Lymphadenopathy:      Cervical: No cervical adenopathy. Skin:     Findings: No rash. Neurological:      Mental Status: She is alert and oriented to person, place, and time. Psychiatric:         Behavior: Behavior is cooperative. FORMAL DIAGNOSTIC RESULTS     RADIOLOGY: Interpretation per the Radiologist below, if available at the time of this note (none if blank):     No orders to display       LABS: (none if blank)  Labs Reviewed   CBC WITH AUTO DIFFERENTIAL - Abnormal; Notable for the following components:       Result Value    RBC 3.78 (*)     Hemoglobin 11.3 (*)     Hematocrit 35.4 (*) MCHC 31.9 (*)     RDW-SD 47.5 (*)     Platelets 966 (*)     Lymphocytes Absolute 0.1 (*)     Monocytes Absolute 0.3 (*)     All other components within normal limits   BASIC METABOLIC PANEL - Abnormal; Notable for the following components:    Glucose 158 (*)     All other components within normal limits   URINE WITH REFLEXED MICRO - Abnormal; Notable for the following components:    Ketones, Urine 15 (*)     Protein, UA 30 (*)     All other components within normal limits   COVID-19 & INFLUENZA COMBO   HEPATIC FUNCTION PANEL   TROPONIN   ANION GAP   GLOMERULAR FILTRATION RATE, ESTIMATED   OSMOLALITY       (Any cultures that may have been sent were not resulted at the time of this patient visit)    [facility] / ED COURSE:     1) Number and Complexity of Problems            Problem List This Visit:         Chief Complaint   Patient presents with    Medication Reaction    Dizziness            Differential Diagnosis includes (but not limited to):   Acacian adverse reaction, flu syndrome viral syndrome COVID UTI pyelonephritis        Diagnoses Considered but I have low suspicion of:   Unlikely sepsis             Pertinent Comorbid Conditions:    History of rheumatoid lung    2)  Data Reviewed (none if left blank)          My Independent interpretations:     EKG:                  Rhythm: normal sinus           Rate: normal          Axis: normal          Ectopy: none          Conduction: Bifascicular block          ST Segments: no acute change          T Waves: no acute change          Q Waves: none           Clinical Impression: Normal sinus rhythm with right bundle branch block, left anterior fascicular block, bifascicular block, LVH EKG    Imaging: None    Labs:      Laboratories are essentially normal                 Decision Rules/Clinical Scores utilized:  NA            External Documentation Reviewed:         Previous patient encounter documents & history available on EMR was reviewed NA             See Formal Diagnostic Results above for the lab and radiology tests and orders. 3)  Treatment and Disposition         ED Reassessment: Patient when reassessed has been feeling well no pain no shortness of breath comfortable with 3 L nasal cannula         Case discussed with consulting clinician: None         Shared Decision-Making was performed and disposition discussed with the        Patient/Family and questions answered NA         Social determinants of health impacting treatment or disposition: Lives alone         Code Status: Full code      Summary of Patient Presentation:      MDM  Number of Diagnoses or Management Options  Dizziness and lightheadedness: new, needed workup  Medication reaction, initial encounter: new, needed workup  Nausea: new, needed workup     Amount and/or Complexity of Data Reviewed  Clinical lab tests: ordered and reviewed  Tests in the medicine section of CPT®: ordered and reviewed  Discussion of test results with the performing providers: no  Decide to obtain previous medical records or to obtain history from someone other than the patient: no  Obtain history from someone other than the patient: no  Review and summarize past medical records: no  Discuss the patient with other providers: no  Independent visualization of images, tracings, or specimens: no    Risk of Complications, Morbidity, and/or Mortality  Presenting problems: low  Diagnostic procedures: low  Management options: low    Patient Progress  Patient progress: stable  /   Vitals Reviewed:    Vitals:    01/11/23 1818 01/11/23 1923 01/11/23 2041   BP: (!) 124/59 134/78 136/74   Pulse: 89 85 88   Resp: 18 18 17   Temp: 99 °F (37.2 °C) 98.9 °F (37.2 °C)    TempSrc: Oral Oral    SpO2: 95% 99% 99%   Weight: 161 lb (73 kg)     Height: 5' 2\" (1.575 m)         The patient was seen and examined. Appropriate diagnostic testing was performed and results reviewed with the patient.       The results of pertinent diagnostic studies and exam findings were discussed. The patients provisional diagnosis and plan of care were discussed with the patient and present family who expressed understanding. Any medications were reviewed and indications and risks of medications were discussed with the patient /family present. Strict verbal and written return precautions, instructions and appropriate follow-up provided to  the patient . ED Medications administered this visit:  (None if blank)  Medications   0.9 % sodium chloride infusion (0 mL/hr IntraVENous Stopped 1/11/23 2108)       PROCEDURES: (None if blank)  Procedures:     CRITICAL CARE:  None      FINAL IMPRESSION      1. Nausea    2. Dizziness and lightheadedness    3. Medication reaction, initial encounter          DISPOSITION/PLAN   DISPOSITION Decision To Discharge 01/11/2023 08:34:42 PM      PATIENT REFERRED TO:  [address] [facility]  [phone]    Schedule an appointment as soon as possible for a visit on 1/17/2023  As needed  DISCHARGE MEDICATIONS:  Discharge Medication List as of 1/11/2023  8:38 PM        START taking these medications    Details   ondansetron (ZOFRAN-ODT) 4 MG disintegrating tablet Take 1 tablet by mouth 3 times daily as needed for Nausea or Vomiting, Disp-15 tablet, R-0Normal               (Please note that portions of this note were completed with a voice recognition program and electronically transcribed. Efforts were made to edit the dictations but occasionally words are mis-transcribed . The transcription may contain errors not detected in proofreading.   This transcription was electronically signed.)     01/11/23 9:17 PM      Gypsy Katz MD        Emergency room physician             Gypsy Katz MD  01/11/23 1951

## 2023-01-11 NOTE — ED TRIAGE NOTES
Pt presents to the ED from home with complaints of having a possible medication reaction to Macrobid. Per report pt has bacteria in her urine and was prescribed this, pt's first dose was last night. After she took the medication pt has been dizzy and lightheaded ever since. Pt states she just doesn't feel right. Pt is alert and oriented x4 with respirations even and unlabored.

## 2023-01-11 NOTE — TELEPHONE ENCOUNTER
Patient called stating that since taking the Macrobid she has been feeling light headed with nausea. She actually took the [address] with supper around 6:00pm and then woke up around 4:00am this morning with the light headedness and nausea.     She is wondering if it is related to the [address] and wants to know if she should take another Macrobid this am.    Please call her back [phone]

## 2023-01-12 LAB
EKG ATRIAL RATE: 87 BPM
EKG P AXIS: 65 DEGREES
EKG P-R INTERVAL: 152 MS
EKG Q-T INTERVAL: 430 MS
EKG QRS DURATION: 162 MS
EKG QTC CALCULATION (BAZETT): 517 MS
EKG R AXIS: -60 DEGREES
EKG T AXIS: 58 DEGREES
EKG VENTRICULAR RATE: 87 BPM

## 2023-01-12 PROCEDURE — 93010 ELECTROCARDIOGRAM REPORT: CPT | Performed by: NUCLEAR MEDICINE

## 2023-01-12 RX ORDER — METOPROLOL SUCCINATE 50 MG/1
TABLET, EXTENDED RELEASE ORAL
Qty: 135 TABLET | Refills: 3 | Status: SHIPPED | OUTPATIENT
Start: 2023-01-12

## 2023-01-12 NOTE — TELEPHONE ENCOUNTER
Patient called. She is waiting for a new prescription to be sent to the pharmacy.     PLEASE CALL HER BACK ASAP

## 2023-01-12 NOTE — ED NOTES
Patient resting in bed. Respirations easy and unlabored. No distress noted. Call light within reach.        Philly Galeano RN  01/11/23 2042

## 2023-01-12 NOTE — TELEPHONE ENCOUNTER
Talked to his son-in law, explained that urine culture still pending, no result of the culture yet. Mrs Wilber Cuba however is doing well . No dizziness, no nausea no vomiting.  Will call her if there is result and if she needs more antibiotics

## 2023-01-12 NOTE — ED NOTES
Patient resting in bed. Respirations easy and unlabored. No distress noted. Call light within reach. Pt and family updated on POC no new questions.    Given warm blanket  UA collected  Swabs collected  Medicated per lesley Dickson RN  01/11/23 1924

## 2023-01-12 NOTE — TELEPHONE ENCOUNTER
Patient has not had anymore episodes of light headedness since stopping the Macrobid.     Please call her back with recommendation

## 2023-01-13 ENCOUNTER — TELEPHONE (OUTPATIENT)
Dept: FAMILY MEDICINE CLINIC | Age: 88
End: 2023-01-13

## 2023-01-13 LAB — URINE CULTURE, ROUTINE: ABNORMAL

## 2023-01-13 RX ORDER — CEFUROXIME AXETIL 500 MG/1
500 TABLET ORAL 2 TIMES DAILY
Qty: 14 TABLET | Refills: 0 | Status: SHIPPED | OUTPATIENT
Start: 2023-01-13 | End: 2023-01-20

## 2023-01-13 NOTE — TELEPHONE ENCOUNTER
Cole Cast MD  [facility] Clinical Staff 9 minutes ago (3:53 PM)     MP  Please call patient , Urine showed E coli, will Kasi Cease to her pharmacy

## 2023-01-17 ENCOUNTER — OFFICE VISIT (OUTPATIENT)
Dept: FAMILY MEDICINE CLINIC | Age: 88
End: 2023-01-17

## 2023-01-17 VITALS
DIASTOLIC BLOOD PRESSURE: 70 MMHG | HEIGHT: 62 IN | SYSTOLIC BLOOD PRESSURE: 120 MMHG | BODY MASS INDEX: 29.3 KG/M2 | HEART RATE: 72 BPM | RESPIRATION RATE: 16 BRPM | WEIGHT: 159.2 LBS

## 2023-01-17 DIAGNOSIS — N39.0 URINARY TRACT INFECTION WITHOUT HEMATURIA, SITE UNSPECIFIED: Primary | ICD-10-CM

## 2023-01-17 DIAGNOSIS — M46.1 SACROILIITIS (HCC): ICD-10-CM

## 2023-01-17 PROCEDURE — 99213 OFFICE O/P EST LOW 20 MIN: CPT | Performed by: EMERGENCY MEDICINE

## 2023-01-17 PROCEDURE — 1123F ACP DISCUSS/DSCN MKR DOCD: CPT | Performed by: EMERGENCY MEDICINE

## 2023-01-17 ASSESSMENT — ENCOUNTER SYMPTOMS
ABDOMINAL PAIN: 0
BACK PAIN: 1
TROUBLE SWALLOWING: 0
DIARRHEA: 0
NAUSEA: 0
WHEEZING: 0
VOICE CHANGE: 0
COUGH: 0
SINUS PRESSURE: 0
CHEST TIGHTNESS: 0
SORE THROAT: 0
VOMITING: 0
CONSTIPATION: 0
RHINORRHEA: 0
SHORTNESS OF BREATH: 0

## 2023-01-17 NOTE — PROGRESS NOTES
Date: 1/18/2023    : Lenin Stanley is a 80 y. o.female who presents today for:  Chief Complaint   Patient presents with    Follow-up     ER    Allergic Reaction    Lower Back Pain         HPI:       Doing well since she stopped the Macrobid, she is not 100 % yet. But no nausea, no fever no chills    Low back pain constant , worse with movement.  She has unequal leg length    Taking Ceftin x 5 days now , has not finished the course yet      CurrentHome Medications were reviewed and updated by this Provider  Current Outpatient Medications   Medication Sig Dispense Refill    cefUROXime (CEFTIN) 500 MG tablet Take 1 tablet by mouth 2 times daily for 7 days 14 tablet 0    metoprolol succinate (TOPROL XL) 50 MG extended release tablet TAKE ONE AND ONE-HALF TABLETS DAILY 135 tablet 3    ondansetron (ZOFRAN-ODT) 4 MG disintegrating tablet Take 1 tablet by mouth 3 times daily as needed for Nausea or Vomiting 15 tablet 0    losartan (COZAAR) 50 MG tablet take 1 tablet by mouth once daily 90 tablet 1    pantoprazole (PROTONIX) 40 MG tablet TAKE 1 TABLET DAILY 90 tablet 1    atorvastatin (LIPITOR) 40 MG tablet TAKE 1 TABLET EVERY EVENING 90 tablet 3    hydroCHLOROthiazide (MICROZIDE) 12.5 MG capsule take 1 capsule by mouth every morning 90 capsule 1    meclizine (ANTIVERT) 25 MG tablet One  at night  for  one  week and one  every  6  hours as  needed  for dizziness 30 tablet 0    ipratropium-albuterol (DUONEB) 0.5-2.5 (3) MG/3ML SOLN nebulizer solution Inhale 3 mLs into the lungs 3 times daily USE 1 VIAL IN NEBULIZER EVERY 6 HOURS - for shortness of breath/wheezing 120 each 8    etanercept (ENBREL) 50 MG/ML injection Inject 50 mg into the skin once a week       Probiotic Product (ALIGN PO) Take by mouth daily      Omega-3 Fatty Acids (FISH OIL PO) Take by mouth      OXYGEN 2 lpm at rest and to sleep and 4 lpm to ambulate (Patient taking differently: 3 lpm at rest and to sleep and 4 lpm to ambulate) 1 Device 6    therapeutic multivitamin-minerals (THERAGRAN-M) tablet Take 1 tablet by mouth daily. Calcium Citrate-Vitamin D 200-200 MG-UNIT TABS Take 1 tablet by mouth 2 times daily       Coenzyme Q10 (COQ10) 100 MG CAPS Take 100 mg by mouth daily. aspirin 81 MG EC tablet Take 81 mg by mouth daily. folic acid (FOLVITE) 1 MG tablet Take 1 mg by mouth daily. No current facility-administered medications for this visit. Subjective:      Review of Systems   Constitutional:  Negative for appetite change, chills, diaphoresis, fatigue and fever. HENT:  Negative for congestion, ear pain, postnasal drip, rhinorrhea, sinus pressure, sneezing, sore throat, trouble swallowing and voice change. Respiratory:  Negative for cough, chest tightness, shortness of breath and wheezing. Cardiovascular:  Negative for chest pain, palpitations and leg swelling. Gastrointestinal:  Negative for abdominal pain, constipation, diarrhea, nausea and vomiting. Musculoskeletal:  Positive for back pain. Negative for arthralgias, joint swelling, myalgias, neck pain and neck stiffness. Neurological:  Negative for dizziness, syncope, weakness, light-headedness, numbness and headaches. Objective:     /70 (Site: Right Upper Arm, Position: Sitting, Cuff Size: Medium Adult)   Pulse 72   Resp 16   Ht 5' 2\" (1.575 m)   Wt 159 lb 3.2 oz (72.2 kg)   BMI 29.12 kg/m²   BP Readings from Last 3 Encounters:   01/17/23 120/70   01/11/23 136/74   01/10/23 120/70     Wt Readings from Last 3 Encounters:   01/17/23 159 lb 3.2 oz (72.2 kg)   01/11/23 161 lb (73 kg)   01/10/23 161 lb 6.4 oz (73.2 kg)       Physical Exam  Vitals reviewed. Constitutional:       Appearance: She is well-developed. HENT:      Head: Normocephalic and atraumatic. Right Ear: External ear normal.      Left Ear: External ear normal.      Nose: Nose normal.   Eyes:      General: No scleral icterus.      Conjunctiva/sclera: Conjunctivae normal. Pupils: Pupils are equal, round, and reactive to light. Neck:      Thyroid: No thyromegaly. Vascular: No JVD. Cardiovascular:      Rate and Rhythm: Normal rate and regular rhythm. Heart sounds: No murmur heard. No friction rub. Pulmonary:      Effort: Pulmonary effort is normal.      Breath sounds: Normal breath sounds. No wheezing or rales. Chest:      Chest wall: No tenderness. Abdominal:      General: Bowel sounds are normal.      Palpations: Abdomen is soft. There is no mass. Tenderness: There is no abdominal tenderness. Musculoskeletal:         General: Tenderness (.SI and sacrum) present. Cervical back: Normal range of motion and neck supple. Lymphadenopathy:      Cervical: No cervical adenopathy. Skin:     Findings: No rash. Neurological:      Mental Status: She is alert and oriented to person, place, and time. Psychiatric:         Behavior: Behavior is cooperative. Assessment:         Diagnosis Orders   1. Urinary tract infection without hematuria, site unspecified        2. Sacroiliitis ([facility] [address]            :      Medications Prescribed:  No orders of the defined types were placed in this encounter. Orders Placed:  No orders of the defined types were placed in this encounter. Lab Results   Component Value Date    LABA1C 5.6 01/10/2023    LABA1C 6.0 09/20/2022    LABA1C 6.0 06/07/2022     Lab Results   Component Value Date    LDLCALC 57 09/14/2022    CREATININE 0.8 01/11/2023       Continue blood sugar check 1 times a day.    + Ecoli on the urine. Continue Ceftin and submit a urine for 48 to 72 hours after finishing the antibiotic    Return in about 12 weeks (around 4/11/2023) for HTN Back pain. Discussed use, benefit, and side effects of prescribed medications, home medications were reviewed and updated today with the patient. All patient questions answered. Pt voiced understanding. Instructedto continue current medications, diet and exercise. Patient agreed with treatmentplan. Allergies, Problem List, Medications, Medical History, Family History, Surgical History and Tobacco History reviewed by provider.

## 2023-01-23 ENCOUNTER — NURSE ONLY (OUTPATIENT)
Dept: FAMILY MEDICINE CLINIC | Age: 88
End: 2023-01-23

## 2023-01-23 DIAGNOSIS — R35.0 URINE FREQUENCY: Primary | ICD-10-CM

## 2023-01-25 LAB — URINE CULTURE, ROUTINE: NORMAL

## 2023-01-26 ENCOUNTER — TELEPHONE (OUTPATIENT)
Dept: FAMILY MEDICINE CLINIC | Age: 88
End: 2023-01-26

## 2023-01-26 NOTE — TELEPHONE ENCOUNTER
Pt called asking what the results of the urine that was dropped off here on Monday, January 23rd.     Please call her back at [phone]

## 2023-01-28 ENCOUNTER — HOSPITAL ENCOUNTER (EMERGENCY)
Age: 88
Discharge: HOME | End: 2023-01-29
Attending: EMERGENCY MEDICINE
Payer: MEDICARE

## 2023-01-28 ENCOUNTER — APPOINTMENT (OUTPATIENT)
Dept: GENERAL RADIOLOGY | Age: 88
End: 2023-01-28
Payer: MEDICARE

## 2023-01-28 DIAGNOSIS — N30.00 ACUTE CYSTITIS WITHOUT HEMATURIA: Primary | ICD-10-CM

## 2023-01-28 DIAGNOSIS — E86.0 DEHYDRATION: ICD-10-CM

## 2023-01-28 LAB
ALBUMIN SERPL BCG-MCNC: 3.3 G/DL (ref 3.5–5.1)
ALP SERPL-CCNC: 59 U/L (ref 38–126)
ALT SERPL W/O P-5'-P-CCNC: 14 U/L (ref 11–66)
ANION GAP SERPL CALC-SCNC: 8 MEQ/L (ref 8–16)
AST SERPL-CCNC: 22 U/L (ref 5–40)
BACTERIA URNS QL MICRO: ABNORMAL /HPF
BASOPHILS ABSOLUTE: 0 THOU/MM3 (ref 0–0.1)
BASOPHILS NFR BLD AUTO: 0.4 %
BILIRUB CONJ SERPL-MCNC: < 0.2 MG/DL (ref 0–0.3)
BILIRUB SERPL-MCNC: 0.6 MG/DL (ref 0.3–1.2)
BILIRUB UR QL STRIP.AUTO: NEGATIVE
BUN SERPL-MCNC: 24 MG/DL (ref 7–22)
CALCIUM SERPL-MCNC: 8.8 MG/DL (ref 8.5–10.5)
CASTS #/AREA URNS LPF: ABNORMAL /LPF
CASTS 2: ABNORMAL /LPF
CHARACTER UR: ABNORMAL
CHLORIDE SERPL-SCNC: 103 MEQ/L (ref 98–111)
CO2 SERPL-SCNC: 30 MEQ/L (ref 23–33)
COLOR: YELLOW
CREAT SERPL-MCNC: 0.8 MG/DL (ref 0.4–1.2)
CRYSTALS URNS MICRO: ABNORMAL
DEPRECATED RDW RBC AUTO: 48 FL (ref 35–45)
EOSINOPHIL NFR BLD AUTO: 5.8 %
EOSINOPHILS ABSOLUTE: 0.3 THOU/MM3 (ref 0–0.4)
EPITHELIAL CELLS, UA: ABNORMAL /HPF
ERYTHROCYTE [DISTWIDTH] IN BLOOD BY AUTOMATED COUNT: 14 % (ref 11.5–14.5)
FLUAV RNA RESP QL NAA+PROBE: NOT DETECTED
FLUBV RNA RESP QL NAA+PROBE: NOT DETECTED
GFR SERPL CREATININE-BSD FRML MDRD: > 60 ML/MIN/1.73M2
GLUCOSE SERPL-MCNC: 112 MG/DL (ref 70–108)
GLUCOSE UR QL STRIP.AUTO: NEGATIVE MG/DL
HCT VFR BLD AUTO: 32.6 % (ref 37–47)
HGB BLD-MCNC: 10.6 GM/DL (ref 12–16)
HGB UR QL STRIP.AUTO: NEGATIVE
IMM GRANULOCYTES # BLD AUTO: 0.02 THOU/MM3 (ref 0–0.07)
IMM GRANULOCYTES NFR BLD AUTO: 0.4 %
KETONES UR QL STRIP.AUTO: ABNORMAL
LYMPHOCYTES ABSOLUTE: 0.9 THOU/MM3 (ref 1–4.8)
LYMPHOCYTES NFR BLD AUTO: 15 %
MCH RBC QN AUTO: 30.6 PG (ref 26–33)
MCHC RBC AUTO-ENTMCNC: 32.5 GM/DL (ref 32.2–35.5)
MCV RBC AUTO: 94.2 FL (ref 81–99)
MISCELLANEOUS 2: ABNORMAL
MONOCYTES ABSOLUTE: 0.4 THOU/MM3 (ref 0.4–1.3)
MONOCYTES NFR BLD AUTO: 7 %
NEUTROPHILS NFR BLD AUTO: 71.4 %
NITRITE UR QL STRIP: NEGATIVE
NRBC BLD AUTO-RTO: 0 /100 WBC
OSMOLALITY SERPL CALC.SUM OF ELEC: 286.1 MOSMOL/KG (ref 275–300)
PH UR STRIP.AUTO: 6.5 [PH] (ref 5–9)
PLATELET # BLD AUTO: 111 THOU/MM3 (ref 130–400)
PMV BLD AUTO: 9.6 FL (ref 9.4–12.4)
POTASSIUM SERPL-SCNC: 4 MEQ/L (ref 3.5–5.2)
PROT SERPL-MCNC: 5.6 G/DL (ref 6.1–8)
PROT UR STRIP.AUTO-MCNC: NEGATIVE MG/DL
RBC # BLD AUTO: 3.46 MILL/MM3 (ref 4.2–5.4)
RBC URINE: ABNORMAL /HPF
RENAL EPI CELLS #/AREA URNS HPF: ABNORMAL /[HPF]
SARS-COV-2 RNA RESP QL NAA+PROBE: NOT DETECTED
SEGMENTED NEUTROPHILS ABSOLUTE COUNT: 4.1 THOU/MM3 (ref 1.8–7.7)
SODIUM SERPL-SCNC: 141 MEQ/L (ref 135–145)
SP GR UR REFRACT.AUTO: 1.02 (ref 1–1.03)
TROPONIN T: < 0.01 NG/ML
UROBILINOGEN, URINE: 0.2 EU/DL (ref 0–1)
WBC # BLD AUTO: 5.7 THOU/MM3 (ref 4.8–10.8)
WBC #/AREA URNS HPF: ABNORMAL /HPF
WBC #/AREA URNS HPF: ABNORMAL /[HPF]
YEAST LIKE FUNGI URNS QL MICRO: ABNORMAL

## 2023-01-28 PROCEDURE — 71045 X-RAY EXAM CHEST 1 VIEW: CPT

## 2023-01-28 PROCEDURE — 85025 COMPLETE CBC W/AUTO DIFF WBC: CPT

## 2023-01-28 PROCEDURE — 2580000003 HC RX 258: Performed by: EMERGENCY MEDICINE

## 2023-01-28 PROCEDURE — 81003 URINALYSIS AUTO W/O SCOPE: CPT

## 2023-01-28 PROCEDURE — 87636 SARSCOV2 & INF A&B AMP PRB: CPT

## 2023-01-28 PROCEDURE — 93005 ELECTROCARDIOGRAM TRACING: CPT | Performed by: EMERGENCY MEDICINE

## 2023-01-28 PROCEDURE — 96360 HYDRATION IV INFUSION INIT: CPT

## 2023-01-28 PROCEDURE — 80053 COMPREHEN METABOLIC PANEL: CPT

## 2023-01-28 PROCEDURE — 82248 BILIRUBIN DIRECT: CPT

## 2023-01-28 PROCEDURE — 36415 COLL VENOUS BLD VENIPUNCTURE: CPT

## 2023-01-28 PROCEDURE — 84484 ASSAY OF TROPONIN QUANT: CPT

## 2023-01-28 PROCEDURE — 99285 EMERGENCY DEPT VISIT HI MDM: CPT

## 2023-01-28 PROCEDURE — 81001 URINALYSIS AUTO W/SCOPE: CPT

## 2023-01-28 PROCEDURE — 6370000000 HC RX 637 (ALT 250 FOR IP): Performed by: EMERGENCY MEDICINE

## 2023-01-28 PROCEDURE — 96361 HYDRATE IV INFUSION ADD-ON: CPT

## 2023-01-28 RX ORDER — CEPHALEXIN 250 MG/1
500 CAPSULE ORAL ONCE
Status: COMPLETED | OUTPATIENT
Start: 2023-01-29 | End: 2023-01-28

## 2023-01-28 RX ORDER — 0.9 % SODIUM CHLORIDE 0.9 %
1000 INTRAVENOUS SOLUTION INTRAVENOUS ONCE
Status: COMPLETED | OUTPATIENT
Start: 2023-01-28 | End: 2023-01-29

## 2023-01-28 RX ADMIN — CEPHALEXIN 500 MG: 250 CAPSULE ORAL at 23:55

## 2023-01-28 RX ADMIN — SODIUM CHLORIDE 1000 ML: 9 INJECTION, SOLUTION INTRAVENOUS at 22:01

## 2023-01-28 ASSESSMENT — ENCOUNTER SYMPTOMS
DIARRHEA: 0
RHINORRHEA: 1
VOMITING: 0
NAUSEA: 0

## 2023-01-28 ASSESSMENT — PAIN - FUNCTIONAL ASSESSMENT: PAIN_FUNCTIONAL_ASSESSMENT: NONE - DENIES PAIN

## 2023-01-29 VITALS
OXYGEN SATURATION: 97 % | SYSTOLIC BLOOD PRESSURE: 139 MMHG | BODY MASS INDEX: 29.44 KG/M2 | HEIGHT: 62 IN | WEIGHT: 160 LBS | RESPIRATION RATE: 18 BRPM | HEART RATE: 75 BPM | TEMPERATURE: 98.2 F | DIASTOLIC BLOOD PRESSURE: 81 MMHG

## 2023-01-29 LAB
EKG ATRIAL RATE: 75 BPM
EKG P AXIS: 40 DEGREES
EKG P-R INTERVAL: 182 MS
EKG Q-T INTERVAL: 430 MS
EKG QRS DURATION: 152 MS
EKG QTC CALCULATION (BAZETT): 480 MS
EKG R AXIS: -48 DEGREES
EKG T AXIS: 35 DEGREES
EKG VENTRICULAR RATE: 75 BPM

## 2023-01-29 PROCEDURE — 93010 ELECTROCARDIOGRAM REPORT: CPT | Performed by: INTERNAL MEDICINE

## 2023-01-29 RX ORDER — CEPHALEXIN 500 MG/1
500 CAPSULE ORAL 2 TIMES DAILY
Qty: 14 CAPSULE | Refills: 0 | Status: SHIPPED | OUTPATIENT
Start: 2023-01-29 | End: 2023-02-05

## 2023-01-29 NOTE — ED NOTES
Iv fluids started at this time. Patient denies needs. Family at bedside.       Yani Brito RN  01/28/23 1556

## 2023-01-29 NOTE — ED NOTES
Pt ambulated to end of de dios and back to bed with walker and 3L O2 via NC per her normal. When pt got back to bed, pt states she \"felt fine but eyes were a little fuzzy or blurry\". Pt had slight labored breathing after ambulating. Pt's O2 sat once back in bed was 72%. O2 rebounded quickly into upper 90s after sitting down for a moment. Provider notified.      Ludmila Davila RN  01/29/23 3492

## 2023-01-29 NOTE — ED NOTES
Pt in bed watching tv with family at bedside. Denies needs. Updated on plan of care. Call light in reach.      Ewa Crane RN  01/28/23 9725

## 2023-01-29 NOTE — ED PROVIDER NOTES
[address] [address]          Pt Name: Manish Brock  MRN: [id number]  [place] 1935  Date of evaluation: 1/28/2023  Emergency Physician: Dianne Gonzalez, [address] [facility]       Chief Complaint   Patient presents with    Urinary Tract Infection     History obtained from the patient. HISTORY OF PRESENT ILLNESS    HPI  Manish Brock is a 80 y.o. female who presents to the emergency department for evaluation of generalized weakness. Patient recently recovering from prior UTI. She had been on antibiotics finishing 5 days ago. She reports she had been doing pretty well prior to today. She reports today she had eaten a bowl of cereal had an orange and 2 crackers all day. Fluid oral intake. Got up from a nap to eat then felt \"WOOZIE\" walking to the kitchen. She states she then sat down and called her daughter to come assist her. No fever no chills no chest pain no shortness of breath no difficulty in breathing. Reports runny nose for the last 3 weeks. Unchanged. No abdominal pain no diarrhea. The patient has no other acute complaints at this time. REVIEW OF SYSTEMS   Review of Systems   Constitutional:  Positive for appetite change and fatigue. Negative for chills and fever. HENT:  Positive for congestion and rhinorrhea. Gastrointestinal:  Negative for diarrhea, nausea and vomiting. Skin:  Negative for rash. Neurological:  Positive for weakness and light-headedness. Negative for dizziness and headaches.        PAST MEDICAL AND SURGICAL HISTORY     Past Medical History:   Diagnosis Date    Acid reflux     Anxiety     Arrhythmia     Arthritis     Breast cancer ([address] 11/1998    left    Bronchiectasis ([address] 09/30/2015    CAD (coronary artery disease)     NON-OBSTRUCTIVE    Cancer ([address]     breast    Diabetes mellitus ([address]     Heart palpitations     HX OF:    History of therapeutic radiation 1998    left breast cancer Hyperlipidemia     Hypertension     ILD (interstitial lung disease) ([facility] 09/30/2015    w/ Bronchectasis    Osteoarthritis     rhuematoid    Rheumatoid arthritis ([facility]     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 [facility] 09/30/2015     Past Surgical History:   Procedure Laterality Date    ABDOMINAL HERNIA REPAIR  9/2013    BREAST LUMPECTOMY  12/10/1998    CARDIOVASCULAR STRESS TEST  06/17/2010    EF 71%    CARDIOVASCULAR STRESS TEST  01/28/2008    EF 60%    CARDIOVASCULAR STRESS TEST  01/22/2005    CATARACT REMOVAL Bilateral     Dr Shellie Watters - 6/27/17 right, 7/10/17 left    CATARACT REMOVAL      CHOLECYSTECTOMY  09/20/2008    [facility]     COLONOSCOPY      HAMMER TOE SURGERY Left 09/2018    HERNIA REPAIR  07/01/2010    [facility]    HIATAL HERNIA REPAIR  09/15/2009    [facility]    JOINT REPLACEMENT  5/29/02    left knee    TONSILLECTOMY AND ADENOIDECTOMY  1945    TOTAL KNEE ARTHROPLASTY Left 2002    TRANSTHORACIC ECHOCARDIOGRAM  01/28/2005    EF 70%    UPPER GASTROINTESTINAL ENDOSCOPY  03/21/2017         MEDICATIONS     Current Facility-Administered Medications:     0.9 % sodium chloride bolus, 1,000 mL, IntraVENous, Once, Brooks Laguerre, DO    Current Outpatient Medications:     metoprolol succinate (TOPROL XL) 50 MG extended release tablet, TAKE ONE AND ONE-HALF TABLETS DAILY, Disp: 135 tablet, Rfl: 3    ondansetron (ZOFRAN-ODT) 4 MG disintegrating tablet, Take 1 tablet by mouth 3 times daily as needed for Nausea or Vomiting, Disp: 15 tablet, Rfl: 0    losartan (COZAAR) 50 MG tablet, take 1 tablet by mouth once daily, Disp: 90 tablet, Rfl: 1    pantoprazole (PROTONIX) 40 MG tablet, TAKE 1 TABLET DAILY, Disp: 90 tablet, Rfl: 1    atorvastatin (LIPITOR) 40 MG tablet, TAKE 1 TABLET EVERY EVENING, Disp: 90 tablet, Rfl: 3    hydroCHLOROthiazide (MICROZIDE) 12.5 MG capsule, take 1 capsule by mouth every morning, Disp: 90 capsule, Rfl: 1    meclizine (ANTIVERT) 25 MG tablet, One  at night  for  one  week and one every  6  hours as  needed  for dizziness, Disp: 30 tablet, Rfl: 0    ipratropium-albuterol (DUONEB) 0.5-2.5 (3) MG/3ML SOLN nebulizer solution, Inhale 3 mLs into the lungs 3 times daily USE 1 VIAL IN NEBULIZER EVERY 6 HOURS - for shortness of breath/wheezing, Disp: 120 each, Rfl: 8    etanercept (ENBREL) 50 MG/ML injection, Inject 50 mg into the skin once a week , Disp: , Rfl:     Probiotic Product (ALIGN PO), Take by mouth daily, Disp: , Rfl:     Omega-3 Fatty Acids (FISH OIL PO), Take by mouth, Disp: , Rfl:     OXYGEN, 2 lpm at rest and to sleep and 4 lpm to ambulate (Patient taking differently: 3 lpm at rest and to sleep and 4 lpm to ambulate), Disp: 1 Device, Rfl: 6    therapeutic multivitamin-minerals (THERAGRAN-M) tablet, Take 1 tablet by mouth daily. , Disp: , Rfl:     Calcium Citrate-Vitamin D 200-200 MG-UNIT TABS, Take 1 tablet by mouth 2 times daily , Disp: , Rfl:     Coenzyme Q10 (COQ10) 100 MG CAPS, Take 100 mg by mouth daily. , Disp: , Rfl:     aspirin 81 MG EC tablet, Take 81 mg by mouth daily. , Disp: , Rfl:     folic acid (FOLVITE) 1 MG tablet, Take 1 mg by mouth daily. , Disp: , Rfl:       SOCIAL HISTORY     Social History     Social History Narrative    Not on file     Social History     Tobacco Use    Smoking status: Never    Smokeless tobacco: Never   Vaping Use    Vaping Use: Never used   Substance Use Topics    Alcohol use:  Yes     Alcohol/week: 0.0 standard drinks     Comment: RARE    Drug use: No         ALLERGIES     Allergies   Allergen Reactions    Macrobid [Nitrofurantoin Macrocrystal] Nausea Only and Dizziness or Vertigo    Sulfa Antibiotics Nausea Only    Amoxicillin Itching and Rash         FAMILY HISTORY     Family History   Problem Relation Age of Onset    Heart Disease Mother     High Cholesterol Mother     Stroke Mother     Diabetes Mother     Heart Disease Father     High Blood Pressure Father     Pacemaker Father     Ovarian Cancer Daughter 40    Breast Cancer Maternal Aunt 39    Breast Cancer Paternal Aunt [address] [facility] [place]     ED Triage Vitals   BP Temp Temp Source Heart Rate Resp SpO2 Height Weight   01/28/23 2101 01/28/23 2104 01/28/23 2104 01/28/23 2101 01/28/23 2101 01/28/23 2101 01/28/23 2101 01/28/23 2101   114/64 98.2 °F (36.8 °C) Oral 86 18 93 % 5' 2\" (1.575 m) 160 lb (72.6 kg)         Additional Vital Signs:  Vitals:    01/28/23 2104   BP:    Pulse:    Resp:    Temp: 98.2 °F (36.8 °C)   SpO2:        Physical Exam  Vitals and nursing note reviewed. HENT:      Head: Normocephalic and atraumatic. Right Ear: Tympanic membrane normal.      Nose: No congestion or rhinorrhea. Mouth/Throat:      Mouth: Mucous membranes are dry. Eyes:      Pupils: Pupils are equal, round, and reactive to light. Cardiovascular:      Rate and Rhythm: Normal rate and regular rhythm. Pulses: Normal pulses. Pulmonary:      Effort: Pulmonary effort is normal. No respiratory distress. Breath sounds: Normal breath sounds. Abdominal:      General: Abdomen is flat. Tenderness: There is no abdominal tenderness. There is no right CVA tenderness, left CVA tenderness or guarding. Musculoskeletal:         General: No tenderness. Cervical back: Normal range of motion. Thoracic back: No tenderness or bony tenderness. Scoliosis present. Lumbar back: No tenderness or bony tenderness. Scoliosis present. Right lower leg: No edema. Left lower leg: No edema. Skin:     General: Skin is warm. Neurological:      General: No focal deficit present. Mental Status: She is alert and oriented to person, place, and time. Motor: No weakness.          MEDICAL DECISION MAKING   Initial Assessment: Given the patient's above chief complaint and findings on history and physical examination, I thought it was appropriate to consider the following emergency medical conditions: UTI, dehydration, acute kidney injury, electrolyte abnormality, COVID, influenza, ACS, malnourishment, failure to thrive, although some of these diagnoses are unlikely they were considered in my medical decision making. Plan: CBC, BMP, ECG, troponin, LFTs COVID, flu swabs, urinalysis symptomatic treatment with IV hydration and reassess         ED RESULTS   Laboratory results:  Labs Reviewed   COVID-19 & INFLUENZA COMBO   URINALYSIS WITH REFLEX TO CULTURE   CBC WITH AUTO DIFFERENTIAL   BASIC METABOLIC PANEL   HEPATIC FUNCTION PANEL   TROPONIN       Radiologic studies results:  No orders to display       ED Medications administered this visit:   Medications   0.9 % sodium chloride bolus (has no administration in time range)         ED COURSE     ED Course as of 01/29/23 0359   Sat Jan 28, 2023   2349 Patient Ambulated with O2. She had a steady gait. O2 was noted to be low on returning to the ED cot. Patient states she was supposed to increase her O2 up to 4-5 L while ambulating per her pulmonologist.  [DD]   Sun Jan 29, 2023   0358 Leukocyte Esterase, Urine(!): TRACE [DD]   0358 WBC, UA: 2-4  Possible mild UTI.  [DD]   7715 SARS-CoV-2 RNA, RT PCR: NOT DETECTED [DD]   9621 INFLUENZA A: NOT DETECTED [DD]   0358 Hemoglobin Quant(!): 10.6  Similar to baseline   [DD]   0359 Troponin T: < 0.010  Negative [DD]   0359 SpO2: 97 %  Resting on home O2.  [DD]      ED Course User Index  [DD] Sylvester Hutchins,        Plan:   Patient with like; dehydration causing her symptoms. Possible UTI. She was noted to have a desat in the ED after ambulating without increased O2. Family states patient is ambulating very well in the ED and was able to walk further than she typically does on a daily basis. Patient with known chronic lung dz and on home 3O at rest. This is unchanged. No CP or shortness of breath. CXR- chronic lung dz. No acute infiltrates. Troponin negative. Flu and covid are negative. Gait steady with ambulation. Started on keflex for possible UTI  IVF given for dehydration.     MDM  Number of Diagnoses or Management Options  Acute cystitis without hematuria: new, needed workup  Dehydration: new, needed workup     Amount and/or Complexity of Data Reviewed  Clinical lab tests: ordered and reviewed  Tests in the radiology section of CPT®: ordered and reviewed  Tests in the medicine section of CPT®: ordered and reviewed  Obtain history from someone other than the patient: yes  Discuss the patient with other providers: (Discussed with Dr. Tavia Braun. )         Decision Rules/Clinical Scores utilized:   none  and Not Applicable. Code Status:  Not addressed during this ED visit    Social determinants of health impacting treatment or disposition:   Geriatric     The diagnosis, extensive differential diagnosis, plan of care, laboratory, and imaging findings were discussed at the bedside. All questions and concerns were addressed at the time of the encounter. MEDICATION CHANGES     DISCHARGE MEDICATIONS:  Discharge Medication List as of 1/29/2023 12:22 AM        START taking these medications    Details   cephALEXin (KEFLEX) 500 MG capsule Take 1 capsule by mouth 2 times daily for 7 days, Disp-14 capsule, R-0Normal                  FINAL DISPOSITION     Final diagnoses:   Acute cystitis without hematuria   Dehydration     Condition: condition: good  Dispo: Discharge to home    PATIENT REFERRED TO:  Arun Banksmojordy  [address] [place] [address]  [phone]    Schedule an appointment as soon as possible for a visit in 3 days      Critical Care Time   None    CRITICAL CARE:  None    PROCEDURES: (None if blank)  Procedures: This transcription was electronically signed. Parts of this transcriptions may have been dictated by use of voice recognition software and electronically transcribed, and parts may have been transcribed with the assistance of an ED scribe. The transcription may contain errors not detected in proofreading.     Electronically Signed: Abram De La Garza DO, 01/28/23, 9:30 PM       Abram De La Garza DO  01/29/23 0407

## 2023-01-29 NOTE — ED TRIAGE NOTES
Patient to ED from home with complaints of uti and not feeling right. Patient reports UTI Diagnosis and treatment that ended on the 20th. Patient had a new urine sample done on the 20th and was not sure of the culture result but felt shaky. Patient denies pain at this time.

## 2023-01-29 NOTE — ED NOTES
Per Dr. Doc Velazquez, pt states she was told by pulmonologist to \"increase her oxgen while walking and she forgot\". Pt states it is normal for her to desat with ambulating.      Anne Marie Contreras RN  01/29/23 0021

## 2023-01-29 NOTE — DISCHARGE INSTRUCTIONS
Return to the ED if you have any new or changing symptoms such as difficulty ambulating, fever, flank pain, vomiting, or you have any or changing symptoms.

## 2023-01-31 ENCOUNTER — OFFICE VISIT (OUTPATIENT)
Dept: FAMILY MEDICINE CLINIC | Age: 88
End: 2023-01-31

## 2023-01-31 ENCOUNTER — TELEPHONE (OUTPATIENT)
Dept: FAMILY MEDICINE CLINIC | Age: 88
End: 2023-01-31

## 2023-01-31 VITALS
WEIGHT: 157 LBS | HEART RATE: 88 BPM | BODY MASS INDEX: 28.89 KG/M2 | SYSTOLIC BLOOD PRESSURE: 106 MMHG | DIASTOLIC BLOOD PRESSURE: 64 MMHG | HEIGHT: 62 IN | RESPIRATION RATE: 14 BRPM

## 2023-01-31 DIAGNOSIS — M46.1 SACROILIITIS (HCC): ICD-10-CM

## 2023-01-31 DIAGNOSIS — E11.69 TYPE 2 DIABETES MELLITUS WITH OTHER SPECIFIED COMPLICATION, UNSPECIFIED WHETHER LONG TERM INSULIN USE (HCC): ICD-10-CM

## 2023-01-31 DIAGNOSIS — R53.1 WEAKNESS: ICD-10-CM

## 2023-01-31 DIAGNOSIS — M47.818 SI JOINT ARTHRITIS: ICD-10-CM

## 2023-01-31 DIAGNOSIS — J96.10 CHRONIC RESPIRATORY FAILURE, UNSPECIFIED WHETHER WITH HYPOXIA OR HYPERCAPNIA (HCC): ICD-10-CM

## 2023-01-31 DIAGNOSIS — R53.83 FATIGUE, UNSPECIFIED TYPE: Primary | ICD-10-CM

## 2023-01-31 LAB
BASOPHILS ABSOLUTE: 0.1 /ΜL
BASOPHILS RELATIVE PERCENT: 1 %
EOSINOPHILS ABSOLUTE: 0.5 /ΜL
EOSINOPHILS RELATIVE PERCENT: 7 %
HCT VFR BLD CALC: 35.7 % (ref 36–46)
HEMOGLOBIN: 12.1 G/DL (ref 12–16)
LYMPHOCYTES ABSOLUTE: 1.7 /ΜL
LYMPHOCYTES RELATIVE PERCENT: 22 %
MAGNESIUM: 2.1 MG/DL
MCH RBC QN AUTO: 30.9 PG
MCHC RBC AUTO-ENTMCNC: 33.9 G/DL
MCV RBC AUTO: 91 FL
MONOCYTES ABSOLUTE: 0.7 /ΜL
MONOCYTES RELATIVE PERCENT: 9 %
NEUTROPHILS ABSOLUTE: 4.9 /ΜL
NEUTROPHILS RELATIVE PERCENT: 61 %
PDW BLD-RTO: 13.6 %
PLATELET # BLD: 145 K/ΜL
PMV BLD AUTO: ABNORMAL FL
RBC # BLD: 3.92 10^6/ΜL
T4 FREE: 1.34
TSH SERPL DL<=0.05 MIU/L-ACNC: 1.5 UIU/ML
VITAMIN B-12: 782
WBC # BLD: 7.8 10^3/ML

## 2023-01-31 PROCEDURE — 1123F ACP DISCUSS/DSCN MKR DOCD: CPT | Performed by: EMERGENCY MEDICINE

## 2023-01-31 PROCEDURE — 99214 OFFICE O/P EST MOD 30 MIN: CPT | Performed by: EMERGENCY MEDICINE

## 2023-01-31 RX ORDER — TRIAMCINOLONE ACETONIDE 40 MG/ML
40 INJECTION, SUSPENSION INTRA-ARTICULAR; INTRAMUSCULAR ONCE
Status: COMPLETED | OUTPATIENT
Start: 2023-01-31 | End: 2023-01-31

## 2023-01-31 RX ADMIN — TRIAMCINOLONE ACETONIDE 40 MG: 40 INJECTION, SUSPENSION INTRA-ARTICULAR; INTRAMUSCULAR at 14:33

## 2023-01-31 ASSESSMENT — ENCOUNTER SYMPTOMS
SHORTNESS OF BREATH: 0
CHEST TIGHTNESS: 0
VOICE CHANGE: 0
ABDOMINAL PAIN: 0
BACK PAIN: 1
RHINORRHEA: 0
CONSTIPATION: 0
WHEEZING: 0
TROUBLE SWALLOWING: 0
SORE THROAT: 0
DIARRHEA: 0
SINUS PRESSURE: 0
VOMITING: 0
COUGH: 0
NAUSEA: 0

## 2023-01-31 NOTE — PROGRESS NOTES
Administrations This Visit       triamcinolone acetonide (KENALOG-40) injection 40 mg       Admin Date  01/31/2023  14:33 Action  Given Dose  40 mg Route  IntraMUSCular Site  Dorsogluteal Right Administered By  Shai Banuelos    Ordering Provider: Jenae Owusu MD    NDC: 4009-1776-44    Lot#: [id number]    : [employer]    Patient Supplied?: No

## 2023-01-31 NOTE — PROGRESS NOTES
Date: 1/31/2023    : Tonia Olivier is a 80 y. o.female who presents today for:  Chief Complaint   Patient presents with    Back Pain         HPI:     Back Pain  Pertinent negatives include no abdominal pain, chest pain, fever, headaches, numbness or weakness. Feels lousy feel like she want to sleep all the time , can keep self going and having fatigue.  Patient crank up her Oxygen w/o help    No Melena no bloody stool, no diarrhea    Still with pain on the right SI joint area      CurrentHome Medications were reviewed and updated today by this Provider  Current Outpatient Medications   Medication Sig Dispense Refill    cephALEXin (KEFLEX) 500 MG capsule Take 1 capsule by mouth 2 times daily for 7 days 14 capsule 0    metoprolol succinate (TOPROL XL) 50 MG extended release tablet TAKE ONE AND ONE-HALF TABLETS DAILY 135 tablet 3    ondansetron (ZOFRAN-ODT) 4 MG disintegrating tablet Take 1 tablet by mouth 3 times daily as needed for Nausea or Vomiting 15 tablet 0    losartan (COZAAR) 50 MG tablet take 1 tablet by mouth once daily 90 tablet 1    pantoprazole (PROTONIX) 40 MG tablet TAKE 1 TABLET DAILY 90 tablet 1    atorvastatin (LIPITOR) 40 MG tablet TAKE 1 TABLET EVERY EVENING 90 tablet 3    hydroCHLOROthiazide (MICROZIDE) 12.5 MG capsule take 1 capsule by mouth every morning 90 capsule 1    meclizine (ANTIVERT) 25 MG tablet One  at night  for  one  week and one  every  6  hours as  needed  for dizziness 30 tablet 0    ipratropium-albuterol (DUONEB) 0.5-2.5 (3) MG/3ML SOLN nebulizer solution Inhale 3 mLs into the lungs 3 times daily USE 1 VIAL IN NEBULIZER EVERY 6 HOURS - for shortness of breath/wheezing 120 each 8    etanercept (ENBREL) 50 MG/ML injection Inject 50 mg into the skin once a week       Probiotic Product (ALIGN PO) Take by mouth daily      Omega-3 Fatty Acids (FISH OIL PO) Take by mouth      OXYGEN 2 lpm at rest and to sleep and 4 lpm to ambulate (Patient taking differently: 3 lpm at rest and to sleep and 4 lpm to ambulate) 1 Device 6    therapeutic multivitamin-minerals (THERAGRAN-M) tablet Take 1 tablet by mouth daily. Calcium Citrate-Vitamin D 200-200 MG-UNIT TABS Take 1 tablet by mouth 2 times daily       Coenzyme Q10 (COQ10) 100 MG CAPS Take 100 mg by mouth daily. aspirin 81 MG EC tablet Take 81 mg by mouth daily. folic acid (FOLVITE) 1 MG tablet Take 1 mg by mouth daily. No current facility-administered medications for this visit. Subjective:      Review of Systems   Constitutional:  Positive for fatigue. Negative for appetite change, chills, diaphoresis and fever. HENT:  Negative for congestion, ear pain, postnasal drip, rhinorrhea, sinus pressure, sneezing, sore throat, trouble swallowing and voice change. Respiratory:  Negative for cough, chest tightness, shortness of breath and wheezing. Cardiovascular:  Negative for chest pain, palpitations and leg swelling. Gastrointestinal:  Negative for abdominal pain, constipation, diarrhea, nausea and vomiting. Musculoskeletal:  Positive for back pain. Negative for arthralgias, joint swelling, myalgias, neck pain and neck stiffness. Neurological:  Negative for dizziness, syncope, weakness, light-headedness, numbness and headaches. Objective:     /64 (Site: Left Upper Arm, Position: Sitting, Cuff Size: Medium Adult)   Pulse 88   Resp 14   Ht 5' 2\" (1.575 m)   Wt 157 lb (71.2 kg)   BMI 28.72 kg/m²   BP Readings from Last 3 Encounters:   01/31/23 106/64   01/29/23 139/81   01/17/23 120/70     Wt Readings from Last 3 Encounters:   01/31/23 157 lb (71.2 kg)   01/28/23 160 lb (72.6 kg)   01/17/23 159 lb 3.2 oz (72.2 kg)       Physical Exam  Vitals reviewed. Constitutional:       Appearance: She is well-developed. HENT:      Head: Normocephalic and atraumatic. Right Ear: External ear normal.      Left Ear: External ear normal.      Nose: Nose normal.   Eyes:      General: No scleral icterus. Conjunctiva/sclera: Conjunctivae normal.      Pupils: Pupils are equal, round, and reactive to light. Neck:      Thyroid: No thyromegaly. Vascular: No JVD. Cardiovascular:      Rate and Rhythm: Normal rate and regular rhythm. Heart sounds: No murmur heard. No friction rub. Pulmonary:      Effort: Pulmonary effort is normal.      Breath sounds: Rales present. No wheezing. Chest:      Chest wall: No tenderness. Abdominal:      General: Bowel sounds are normal.      Palpations: Abdomen is soft. There is no mass. Tenderness: There is no abdominal tenderness. Musculoskeletal:         General: Tenderness (. right SI joint) present. Cervical back: Normal range of motion and neck supple. Lymphadenopathy:      Cervical: No cervical adenopathy. Skin:     Findings: No rash. Neurological:      Mental Status: She is alert and oriented to person, place, and time. Psychiatric:         Behavior: Behavior is cooperative. Assessment:         Diagnosis Orders   1. Fatigue, unspecified type  Folate    CBC with Auto Differential    Vitamin B12    TSH with Reflex    T4, Free    Magnesium      2. SI joint arthritis        3. Sacroiliitis ([facility]        4. Type 2 diabetes mellitus with other specified complication, unspecified whether long term insulin use ([address]        5. Weakness        6.  Chronic respiratory failure, stable on Oxygen            Medical Decision Making:      Medications Prescribed:  Orders Placed This Encounter   Medications    triamcinolone acetonide (KENALOG-40) injection 40 mg     Orders Placed:  Orders Placed This Encounter   Procedures    Folate     Standing Status:   Future     Standing Expiration Date:   1/31/2024    CBC with Auto Differential     Standing Status:   Future     Standing Expiration Date:   1/31/2024    Vitamin B12     Standing Status:   Future     Standing Expiration Date:   1/31/2024    TSH with Reflex     Standing Status:   Future     Standing Expiration Date:   1/31/2024    T4, Free     Standing Status:   Future     Standing Expiration Date:   1/31/2024    Magnesium     Standing Status:   Future     Standing Expiration Date:   1/31/2024     Given Sample of Ensures    Return in about 4 weeks (around 2/28/2023). Discussed use, benefit, and side effects of prescribedmedications. Current home medications reviewed and updated with the patient. All patient questions answered. Pt voiced understanding. Instructedto continue current medications, diet and exercise. Patient agreed with treatmentplan. Previous Notes, Consultations Notes, previous Laboratories available were reviewed with the patient during this visit:    Allergies, Problem List, Medications, Medical History, Family History, Surgical History and Tobacco History reviewed by provider.

## 2023-01-31 NOTE — TELEPHONE ENCOUNTER
I called the [facility] on Aging and was told that Edgardo Givens was approved for Home Delivered Meals but that there is a waiting list. When I told them she has been to the ER twice and was weak and dehydrated they put me through and I left a message with the Nutrition Department to call me back regarding her status.

## 2023-02-01 DIAGNOSIS — R53.83 FATIGUE, UNSPECIFIED TYPE: ICD-10-CM

## 2023-02-02 ENCOUNTER — TELEPHONE (OUTPATIENT)
Dept: FAMILY MEDICINE CLINIC | Age: 88
End: 2023-02-02

## 2023-02-02 NOTE — TELEPHONE ENCOUNTER
I spoke with [facility] on Aging and was informed she is 127 on a waiting list of over 400 and it changes daily. I updated the answers to her questions and that put her at 127. They did call and update her and gave her the number to [employer] so she can set it up self pay and she is going to do that.

## 2023-02-02 NOTE — TELEPHONE ENCOUNTER
----- Message from Bri Aj MD sent at 2/2/2023  9:54 AM EST -----  Please call patient  all her laboratories are within normal limit.   Please check how is she doing

## 2023-02-24 RX ORDER — HYDROCHLOROTHIAZIDE 12.5 MG/1
12.5 CAPSULE, GELATIN COATED ORAL EVERY MORNING
Qty: 90 CAPSULE | Refills: 1 | Status: SHIPPED | OUTPATIENT
Start: 2023-02-24

## 2023-02-24 NOTE — TELEPHONE ENCOUNTER
Date of last visit:  1/31/2023  Date of next visit:  2/28/2023    Requested Prescriptions     Pending Prescriptions Disp Refills    hydroCHLOROthiazide (MICROZIDE) 12.5 MG capsule [Pharmacy Med Name: HYDROCHLOROTHIAZIDE 12.5 MG CP] 90 capsule 1     Sig: take 1 capsule by mouth every morning

## 2023-02-28 ENCOUNTER — OFFICE VISIT (OUTPATIENT)
Dept: FAMILY MEDICINE CLINIC | Age: 88
End: 2023-02-28

## 2023-02-28 VITALS
RESPIRATION RATE: 20 BRPM | HEIGHT: 62 IN | WEIGHT: 155.4 LBS | BODY MASS INDEX: 28.6 KG/M2 | HEART RATE: 76 BPM | SYSTOLIC BLOOD PRESSURE: 122 MMHG | DIASTOLIC BLOOD PRESSURE: 74 MMHG

## 2023-02-28 DIAGNOSIS — M47.818 SI JOINT ARTHRITIS: ICD-10-CM

## 2023-02-28 DIAGNOSIS — Z78.0 POST-MENOPAUSAL: ICD-10-CM

## 2023-02-28 DIAGNOSIS — M54.50 CHRONIC MIDLINE LOW BACK PAIN WITHOUT SCIATICA: Primary | ICD-10-CM

## 2023-02-28 DIAGNOSIS — G89.29 CHRONIC MIDLINE LOW BACK PAIN WITHOUT SCIATICA: Primary | ICD-10-CM

## 2023-02-28 PROCEDURE — 99214 OFFICE O/P EST MOD 30 MIN: CPT | Performed by: EMERGENCY MEDICINE

## 2023-02-28 PROCEDURE — 1123F ACP DISCUSS/DSCN MKR DOCD: CPT | Performed by: EMERGENCY MEDICINE

## 2023-02-28 RX ORDER — TRIAMCINOLONE ACETONIDE 40 MG/ML
40 INJECTION, SUSPENSION INTRA-ARTICULAR; INTRAMUSCULAR ONCE
Status: COMPLETED | OUTPATIENT
Start: 2023-02-28 | End: 2023-02-28

## 2023-02-28 RX ORDER — TRAMADOL HYDROCHLORIDE 50 MG/1
50 TABLET ORAL 2 TIMES DAILY PRN
Qty: 30 TABLET | Refills: 0 | Status: SHIPPED | OUTPATIENT
Start: 2023-02-28 | End: 2023-03-15

## 2023-02-28 RX ADMIN — TRIAMCINOLONE ACETONIDE 40 MG: 40 INJECTION, SUSPENSION INTRA-ARTICULAR; INTRAMUSCULAR at 14:24

## 2023-02-28 SDOH — ECONOMIC STABILITY: FOOD INSECURITY: WITHIN THE PAST 12 MONTHS, THE FOOD YOU BOUGHT JUST DIDN'T LAST AND YOU DIDN'T HAVE MONEY TO GET MORE.: NEVER TRUE

## 2023-02-28 SDOH — ECONOMIC STABILITY: INCOME INSECURITY: HOW HARD IS IT FOR YOU TO PAY FOR THE VERY BASICS LIKE FOOD, HOUSING, MEDICAL CARE, AND HEATING?: NOT VERY HARD

## 2023-02-28 SDOH — ECONOMIC STABILITY: FOOD INSECURITY: WITHIN THE PAST 12 MONTHS, YOU WORRIED THAT YOUR FOOD WOULD RUN OUT BEFORE YOU GOT MONEY TO BUY MORE.: NEVER TRUE

## 2023-02-28 SDOH — ECONOMIC STABILITY: HOUSING INSECURITY
IN THE LAST 12 MONTHS, WAS THERE A TIME WHEN YOU DID NOT HAVE A STEADY PLACE TO SLEEP OR SLEPT IN A SHELTER (INCLUDING NOW)?: NO

## 2023-02-28 ASSESSMENT — ENCOUNTER SYMPTOMS
BACK PAIN: 1
SHORTNESS OF BREATH: 0
COUGH: 0
VOMITING: 0
VOICE CHANGE: 0
RHINORRHEA: 0
CHEST TIGHTNESS: 0
DIARRHEA: 0
WHEEZING: 0
TROUBLE SWALLOWING: 0
NAUSEA: 0
CONSTIPATION: 0
SORE THROAT: 0
SINUS PRESSURE: 0
ABDOMINAL PAIN: 0

## 2023-02-28 NOTE — PROGRESS NOTES
Date: 2/28/2023    : Robb Velasquez is a 80 y. o.female who presents today for:  Chief Complaint   Patient presents with    Diabetes    Gastroesophageal Reflux    Hypertension    Hyperlipidemia         HPI:     Patient is here for follow-up, or dizziness tiredness appetite is much better however been complaining all recovering low back pain on the SI joint areas. She was given triamcinolone IM 4 weeks ago and states that the pain much better for 3 weeks however again recurred one week ago    Diabetes  Pertinent negatives for hypoglycemia include no dizziness or headaches. Pertinent negatives for diabetes include no chest pain, no fatigue and no weakness. Gastroesophageal Reflux  She reports no abdominal pain, no chest pain, no coughing, no nausea, no sore throat or no wheezing. Pertinent negatives include no fatigue. Hypertension  Pertinent negatives include no chest pain, headaches, neck pain, palpitations or shortness of breath. Hyperlipidemia  Pertinent negatives include no chest pain, myalgias or shortness of breath. CurrentHome Medications were reviewed and updated by this Provider  Current Outpatient Medications   Medication Sig Dispense Refill    traMADol (ULTRAM) 50 MG tablet Take 1 tablet by mouth 2 times daily as needed for Pain for up to 15 days.  Max Daily Amount: 100 mg 30 tablet 0    hydroCHLOROthiazide (MICROZIDE) 12.5 MG capsule take 1 capsule by mouth every morning 90 capsule 1    metoprolol succinate (TOPROL XL) 50 MG extended release tablet TAKE ONE AND ONE-HALF TABLETS DAILY 135 tablet 3    ondansetron (ZOFRAN-ODT) 4 MG disintegrating tablet Take 1 tablet by mouth 3 times daily as needed for Nausea or Vomiting 15 tablet 0    losartan (COZAAR) 50 MG tablet take 1 tablet by mouth once daily 90 tablet 1    pantoprazole (PROTONIX) 40 MG tablet TAKE 1 TABLET DAILY 90 tablet 1    atorvastatin (LIPITOR) 40 MG tablet TAKE 1 TABLET EVERY EVENING 90 tablet 3    meclizine (ANTIVERT) 25 MG tablet One  at night  for  one  week and one  every  6  hours as  needed  for dizziness 30 tablet 0    ipratropium-albuterol (DUONEB) 0.5-2.5 (3) MG/3ML SOLN nebulizer solution Inhale 3 mLs into the lungs 3 times daily USE 1 VIAL IN NEBULIZER EVERY 6 HOURS - for shortness of breath/wheezing 120 each 8    etanercept (ENBREL) 50 MG/ML injection Inject 50 mg into the skin once a week       Probiotic Product (ALIGN PO) Take by mouth daily      Omega-3 Fatty Acids (FISH OIL PO) Take by mouth      OXYGEN 2 lpm at rest and to sleep and 4 lpm to ambulate (Patient taking differently: 3 lpm at rest and to sleep and 4 lpm to ambulate) 1 Device 6    therapeutic multivitamin-minerals (THERAGRAN-M) tablet Take 1 tablet by mouth daily. Calcium Citrate-Vitamin D 200-200 MG-UNIT TABS Take 1 tablet by mouth 2 times daily       Coenzyme Q10 (COQ10) 100 MG CAPS Take 100 mg by mouth daily. aspirin 81 MG EC tablet Take 81 mg by mouth daily. folic acid (FOLVITE) 1 MG tablet Take 1 mg by mouth daily. Current Facility-Administered Medications   Medication Dose Route Frequency Provider Last Rate Last Admin    triamcinolone acetonide (KENALOG-40) injection 40 mg  40 mg IntraMUSCular Once Bri MD aMdai           Subjective:      Review of Systems   Constitutional:  Negative for appetite change, chills, diaphoresis, fatigue and fever. HENT:  Negative for congestion, ear pain, postnasal drip, rhinorrhea, sinus pressure, sneezing, sore throat, trouble swallowing and voice change. Respiratory:  Negative for cough, chest tightness, shortness of breath and wheezing. Cardiovascular:  Negative for chest pain, palpitations and leg swelling. Gastrointestinal:  Negative for abdominal pain, constipation, diarrhea, nausea and vomiting. Musculoskeletal:  Positive for arthralgias and back pain. Negative for joint swelling, myalgias, neck pain and neck stiffness.    Neurological:  Negative for dizziness, syncope, weakness, light-headedness, numbness and headaches. Objective:     /74 (Site: Right Upper Arm, Position: Sitting, Cuff Size: Medium Adult)   Pulse 76   Resp 20   Ht 5' 2\" (1.575 m)   Wt 155 lb 6.4 oz (70.5 kg)   BMI 28.42 kg/m²   BP Readings from Last 3 Encounters:   02/28/23 122/74   01/31/23 106/64   01/29/23 139/81     Wt Readings from Last 3 Encounters:   02/28/23 155 lb 6.4 oz (70.5 kg)   01/31/23 157 lb (71.2 kg)   01/28/23 160 lb (72.6 kg)       Physical Exam  Vitals reviewed. Constitutional:       Appearance: She is well-developed. HENT:      Head: Normocephalic and atraumatic. Right Ear: External ear normal.      Left Ear: External ear normal.      Nose: Nose normal.   Eyes:      General: No scleral icterus. Conjunctiva/sclera: Conjunctivae normal.      Pupils: Pupils are equal, round, and reactive to light. Neck:      Thyroid: No thyromegaly. Vascular: No JVD. Cardiovascular:      Rate and Rhythm: Normal rate and regular rhythm. Heart sounds: No murmur heard. No friction rub. Pulmonary:      Effort: Pulmonary effort is normal.      Breath sounds: Normal breath sounds. No wheezing or rales. Chest:      Chest wall: No tenderness. Abdominal:      General: Bowel sounds are normal.      Palpations: Abdomen is soft. There is no mass. Tenderness: There is no abdominal tenderness. Musculoskeletal:         General: Tenderness (lower lumbar spine and left SI joint) present. Cervical back: Normal range of motion and neck supple. Lymphadenopathy:      Cervical: No cervical adenopathy. Skin:     Findings: No rash. Neurological:      Mental Status: She is alert and oriented to person, place, and time. Psychiatric:         Behavior: Behavior is cooperative. Assessment:         Diagnosis Orders   1. Chronic midline low back pain without sciatica  XR LUMBAR SPINE (2-3 VIEWS)    traMADol (ULTRAM) 50 MG tablet      2.  Post-menopausal  DEXA BONE DENSITY 2 SITES      3. SI joint arthritis  XR LUMBAR SPINE (2-3 VIEWS)    DEXA BONE DENSITY 2 SITES    traMADol (ULTRAM) 50 MG tablet          :      Medications Prescribed:  Orders Placed This Encounter   Medications    triamcinolone acetonide (KENALOG-40) injection 40 mg    traMADol (ULTRAM) 50 MG tablet     Sig: Take 1 tablet by mouth 2 times daily as needed for Pain for up to 15 days. Max Daily Amount: 100 mg     Dispense:  30 tablet     Refill:  0     Reduce doses taken as pain becomes manageable       Orders Placed:  Orders Placed This Encounter   Procedures    XR LUMBAR SPINE (2-3 VIEWS)     Standing Status:   Future     Standing Expiration Date:   2/28/2024    DEXA BONE DENSITY 2 SITES     Standing Status:   Future     Standing Expiration Date:   2/28/2024     Order Specific Question:   Reason for exam:     Answer:   low back pain       Lab Results   Component Value Date    LABA1C 5.6 01/10/2023    LABA1C 6.0 09/20/2022    LABA1C 6.0 06/07/2022     Lab Results   Component Value Date    LDLCALC 57 09/14/2022    CREATININE 0.8 01/28/2023     I tried to call the patient rheumatologist Dr. Ita Decker however cannot get hold of her    Return in about 6 weeks (around 4/11/2023) for back pain. Discussed use, benefit, and side effects of prescribed medications, home medications were reviewed and updated today with the patient. All patient questions answered. Pt voiced understanding. Instructedto continue current medications, diet and exercise. Patient agreed with treatmentplan. Previous Notes, Consultations Notes, previous Laboratories available were reviewed with the patient during this visit:    Allergies, Problem List, Medications, Medical History, Family History, Surgical History and Tobacco History reviewed by provider.

## 2023-02-28 NOTE — PROGRESS NOTES
After obtaining consent, and per orders of Dr. Eduardo Agrawal, injection of Kenalog 40 mg given in Right upper quad. gluteus by Eladio Adame MA. Patient instructed to remain in clinic for 20 minutes afterwards, and to report any adverse reaction to me immediately.

## 2023-03-01 ENCOUNTER — HOSPITAL ENCOUNTER (OUTPATIENT)
Age: 88
Discharge: HOME | End: 2023-03-01
Payer: MEDICARE

## 2023-03-01 ENCOUNTER — HOSPITAL ENCOUNTER (OUTPATIENT)
Dept: GENERAL RADIOLOGY | Age: 88
Discharge: HOME | End: 2023-03-01
Payer: MEDICARE

## 2023-03-01 DIAGNOSIS — M47.818 SI JOINT ARTHRITIS: ICD-10-CM

## 2023-03-01 DIAGNOSIS — M54.50 CHRONIC MIDLINE LOW BACK PAIN WITHOUT SCIATICA: ICD-10-CM

## 2023-03-01 DIAGNOSIS — G89.29 CHRONIC MIDLINE LOW BACK PAIN WITHOUT SCIATICA: ICD-10-CM

## 2023-03-01 PROCEDURE — 72100 X-RAY EXAM L-S SPINE 2/3 VWS: CPT

## 2023-03-06 ENCOUNTER — TELEPHONE (OUTPATIENT)
Dept: FAMILY MEDICINE CLINIC | Age: 88
End: 2023-03-06

## 2023-03-06 NOTE — TELEPHONE ENCOUNTER
Pt is scheduled at [facility] on Friday 3- arrive at 10:10 in the [facility]. She is to have no calcium or vitamins 24 hr prior to testing. Pt informed via VM.

## 2023-03-06 NOTE — TELEPHONE ENCOUNTER
Patient called stating we were suppose to schedule her Dexa scan at [address] [place] [address] and call her back and she has not heard anything yet. Please schedule and call her back.

## 2023-03-10 ENCOUNTER — HOSPITAL ENCOUNTER (OUTPATIENT)
Dept: WOMENS IMAGING | Age: 88
Discharge: HOME | End: 2023-03-10
Payer: MEDICARE

## 2023-03-10 DIAGNOSIS — M47.818 SI JOINT ARTHRITIS: ICD-10-CM

## 2023-03-10 DIAGNOSIS — Z78.0 POST-MENOPAUSAL: ICD-10-CM

## 2023-03-10 PROCEDURE — 77080 DXA BONE DENSITY AXIAL: CPT

## 2023-03-13 ENCOUNTER — TELEPHONE (OUTPATIENT)
Dept: FAMILY MEDICINE CLINIC | Age: 88
End: 2023-03-13

## 2023-03-13 DIAGNOSIS — M81.0 OSTEOPOROSIS, SENILE: Primary | ICD-10-CM

## 2023-03-13 DIAGNOSIS — M05.10 RHEUMATOID LUNG DISEASE WITH RHEUMATOID ARTHRITIS (HCC): ICD-10-CM

## 2023-03-13 NOTE — TELEPHONE ENCOUNTER
----- Message from Sheila Russell MD sent at 3/13/2023  3:24 PM EDT -----  Please call patient patient and osteoporosis on her last DEXA scan. Patient needs to take over-the-counter Calcium with vitamin D 2 tablets per day. patient will Evenity or Prolia for the bone

## 2023-03-13 NOTE — TELEPHONE ENCOUNTER
Pt informed. Pt said that she would like for Dr Juventino Ruffin to pick which one would be best for her.

## 2023-03-15 NOTE — TELEPHONE ENCOUNTER
Pt called back asking which infusion doctor prefers? Please call pt once addressed. Pt would like a Mon or Friday afternoon appt.       [id number] [facility]

## 2023-03-20 NOTE — TELEPHONE ENCOUNTER
Dr Jakub Deras could you please address which you would like for the pt. Evenity or Prolia. I spoke with Marc Isbell and told her that once Dr Jakub Deras signs the form for whichever he chooses then it will go to Outpatient [facility]. Once they get the form they will start the approval process and then once approved they will call to schedule.

## 2023-03-21 RX ORDER — DENOSUMAB 60 MG/ML
60 INJECTION SUBCUTANEOUS
Qty: 1 ML | Refills: 1 | Status: SHIPPED | OUTPATIENT
Start: 2023-03-21

## 2023-03-21 NOTE — TELEPHONE ENCOUNTER
Pt informed and orders for Prolia were faxed to [facility]. They will work on the PA and call pt when approved and schedule.  Also faxed lab orders to [facility] per pt request.

## 2023-03-22 RX ORDER — SODIUM CHLORIDE 9 MG/ML
INJECTION, SOLUTION INTRAVENOUS CONTINUOUS
OUTPATIENT
Start: 2023-03-27

## 2023-03-22 RX ORDER — EPINEPHRINE 1 MG/ML
0.3 INJECTION, SOLUTION, CONCENTRATE INTRAVENOUS PRN
OUTPATIENT
Start: 2023-03-27

## 2023-03-22 RX ORDER — DIPHENHYDRAMINE HYDROCHLORIDE 50 MG/ML
50 INJECTION INTRAMUSCULAR; INTRAVENOUS
OUTPATIENT
Start: 2023-03-27

## 2023-03-28 LAB
ANION GAP SERPL CALCULATED.3IONS-SCNC: 6 MEQ/L (ref 7–16)
BUN BLDV-MCNC: 28 MG/DL (ref 8–23)
CALCIUM SERPL-MCNC: 10.1 MG/DL (ref 8.5–10.5)
CHLORIDE BLD-SCNC: 97 MEQ/L (ref 95–107)
CO2: 36 MEQ/L (ref 19–31)
CREAT SERPL-MCNC: 1 MG/DL (ref 0.6–1.3)
EGFR IF NONAFRICAN AMERICAN: 55 ML/MIN/1.73
GLUCOSE: 140 MG/DL (ref 70–99)
POTASSIUM SERPL-SCNC: 3.8 MEQ/L (ref 3.5–5.4)
SODIUM BLD-SCNC: 139 MEQ/L (ref 133–146)

## 2023-03-29 LAB — VITAMIN D 25-HYDROXY: 55 NG/ML

## 2023-03-30 ENCOUNTER — TELEPHONE (OUTPATIENT)
Dept: FAMILY MEDICINE CLINIC | Age: 88
End: 2023-03-30

## 2023-03-30 NOTE — TELEPHONE ENCOUNTER
----- Message from Cari Knight MD sent at 3/30/2023 12:21 PM EDT -----  Please call patient all her laboratories including vitamin D are all [facility]

## 2023-04-07 ENCOUNTER — HOSPITAL ENCOUNTER (OUTPATIENT)
Dept: NURSING | Age: 88
Discharge: HOME | End: 2023-04-07
Payer: MEDICARE

## 2023-04-07 VITALS
SYSTOLIC BLOOD PRESSURE: 98 MMHG | RESPIRATION RATE: 18 BRPM | TEMPERATURE: 97.6 F | OXYGEN SATURATION: 90 % | DIASTOLIC BLOOD PRESSURE: 57 MMHG | HEART RATE: 81 BPM

## 2023-04-07 DIAGNOSIS — M81.0 OSTEOPOROSIS, SENILE: Primary | ICD-10-CM

## 2023-04-07 PROCEDURE — 6360000002 HC RX W HCPCS: Performed by: EMERGENCY MEDICINE

## 2023-04-07 PROCEDURE — 96372 THER/PROPH/DIAG INJ SC/IM: CPT

## 2023-04-07 RX ORDER — DIPHENHYDRAMINE HYDROCHLORIDE 50 MG/ML
50 INJECTION INTRAMUSCULAR; INTRAVENOUS
OUTPATIENT
Start: 2023-10-06

## 2023-04-07 RX ORDER — SODIUM CHLORIDE 9 MG/ML
INJECTION, SOLUTION INTRAVENOUS CONTINUOUS
OUTPATIENT
Start: 2023-10-06

## 2023-04-07 RX ORDER — EPINEPHRINE 1 MG/ML
0.3 INJECTION, SOLUTION INTRAMUSCULAR; SUBCUTANEOUS PRN
OUTPATIENT
Start: 2023-10-06

## 2023-04-07 RX ADMIN — DENOSUMAB 60 MG: 60 INJECTION SUBCUTANEOUS at 13:45

## 2023-04-07 ASSESSMENT — PAIN SCALES - GENERAL: PAINLEVEL_OUTOF10: 0

## 2023-04-07 NOTE — PROGRESS NOTES
1335 Patient here for Prolia injection. Patient verbalizes understanding of injection. PT RIGHTS AND RESPONSIBILITIES OFFERED TO PT.     1345 Prolia injection given to patient. Patient tolerated well. 1400 AVS reviewed with patient. Verbalizes understanding. Patient left in wheelchair to discharge lobby.        _M___ Safety:       (Environmental)  [place] to environment  Ensure ID band is correct and in place/ allergy band as needed  Assess for fall risk  Initiate fall precautions as applicable (fall band, side rails, etc.)  Call light within reach  Bed in low position/ wheels locked    _M___ Pain:       Assess pain level and characteristics  Administer analgesics as ordered  Assess effectiveness of pain management and report to MD as needed    _M___ Knowledge Deficit:  Assess baseline knowledge  Provide teaching at level of understanding  Provide teaching via preferred learning method  Evaluate teaching effectiveness    _M___ Hemodynamic/Respiratory Status:       (Pre and Post Procedure Monitoring)  Assess/Monitor vital signs and LOC  Assess Baseline SpO2 prior to any sedation  Obtain weight/height  Assess vital signs/ LOC until patient meets discharge criteria  Monitor procedure site and notify MD of any issues

## 2023-04-07 NOTE — DISCHARGE INSTRUCTIONS
ACTIVITY:  Continue usual care with your doctor. Call your doctor immediately if any severe problems or go to the nearest emergency room. I have been treated and hereby acknowledge receiving this instruction sheet. 10/10/23 @3PM                Prolia injection discharge instructions:    Side effects: headache, joint pain, rash, swelling    Next Prolia injection on: This medication is given every 6 months. We will need a new order before we schedule your next one due around:     Please call [phone] with a any questions or concerns.

## 2023-04-18 ENCOUNTER — OFFICE VISIT (OUTPATIENT)
Dept: FAMILY MEDICINE CLINIC | Age: 88
End: 2023-04-18

## 2023-04-18 VITALS
BODY MASS INDEX: 28.74 KG/M2 | DIASTOLIC BLOOD PRESSURE: 80 MMHG | SYSTOLIC BLOOD PRESSURE: 130 MMHG | WEIGHT: 156.2 LBS | HEIGHT: 62 IN | HEART RATE: 80 BPM | RESPIRATION RATE: 16 BRPM

## 2023-04-18 DIAGNOSIS — M05.10 RHEUMATOID LUNG DISEASE WITH RHEUMATOID ARTHRITIS (HCC): ICD-10-CM

## 2023-04-18 DIAGNOSIS — E11.69 TYPE 2 DIABETES MELLITUS WITH OTHER SPECIFIED COMPLICATION, UNSPECIFIED WHETHER LONG TERM INSULIN USE (HCC): Primary | ICD-10-CM

## 2023-04-18 DIAGNOSIS — G89.29 CHRONIC MIDLINE LOW BACK PAIN WITHOUT SCIATICA: ICD-10-CM

## 2023-04-18 DIAGNOSIS — J96.10 CHRONIC RESPIRATORY FAILURE, UNSPECIFIED WHETHER WITH HYPOXIA OR HYPERCAPNIA (HCC): ICD-10-CM

## 2023-04-18 DIAGNOSIS — M54.50 CHRONIC MIDLINE LOW BACK PAIN WITHOUT SCIATICA: ICD-10-CM

## 2023-04-18 LAB
CHP ED QC CHECK: ABNORMAL
GLUCOSE BLD-MCNC: 118 MG/DL
HBA1C MFR BLD: 6 %

## 2023-04-18 PROCEDURE — 1123F ACP DISCUSS/DSCN MKR DOCD: CPT | Performed by: EMERGENCY MEDICINE

## 2023-04-18 PROCEDURE — 82962 GLUCOSE BLOOD TEST: CPT | Performed by: EMERGENCY MEDICINE

## 2023-04-18 PROCEDURE — 99213 OFFICE O/P EST LOW 20 MIN: CPT | Performed by: EMERGENCY MEDICINE

## 2023-04-18 PROCEDURE — 83036 HEMOGLOBIN GLYCOSYLATED A1C: CPT | Performed by: EMERGENCY MEDICINE

## 2023-04-18 ASSESSMENT — ENCOUNTER SYMPTOMS
SHORTNESS OF BREATH: 0
SINUS PRESSURE: 0
BACK PAIN: 1
NAUSEA: 0
ABDOMINAL PAIN: 0
WHEEZING: 0
VOMITING: 0
COUGH: 0
VOICE CHANGE: 0
TROUBLE SWALLOWING: 0
CONSTIPATION: 0
DIARRHEA: 0
CHEST TIGHTNESS: 0
RHINORRHEA: 0
SORE THROAT: 0

## 2023-04-18 NOTE — PROGRESS NOTES
Date: 4/18/2023    : Bakari Madsen is a 80 y. o.female who presents today for:  Chief Complaint   Patient presents with    Hyperlipidemia    Diabetes    Hypertension         HPI:     Gotten Prolia and tolerating it well        Hyperlipidemia  Pertinent negatives include no chest pain, myalgias or shortness of breath. Diabetes  Pertinent negatives for hypoglycemia include no dizziness or headaches. Pertinent negatives for diabetes include no chest pain, no fatigue and no weakness. Hypertension  Pertinent negatives include no chest pain, headaches, neck pain, palpitations or shortness of breath. Gastroesophageal Reflux  She reports no abdominal pain, no chest pain, no coughing, no nausea, no sore throat or no wheezing. Pertinent negatives include no fatigue.      CurrentHome Medications were reviewed and updated by this Provider  Current Outpatient Medications   Medication Sig Dispense Refill    denosumab (PROLIA) 60 MG/ML SOSY SC injection Inject 1 mL into the skin every 6 months 1 mL 1    hydroCHLOROthiazide (MICROZIDE) 12.5 MG capsule take 1 capsule by mouth every morning 90 capsule 1    metoprolol succinate (TOPROL XL) 50 MG extended release tablet TAKE ONE AND ONE-HALF TABLETS DAILY 135 tablet 3    ondansetron (ZOFRAN-ODT) 4 MG disintegrating tablet Take 1 tablet by mouth 3 times daily as needed for Nausea or Vomiting 15 tablet 0    pantoprazole (PROTONIX) 40 MG tablet TAKE 1 TABLET DAILY 90 tablet 1    atorvastatin (LIPITOR) 40 MG tablet TAKE 1 TABLET EVERY EVENING 90 tablet 3    meclizine (ANTIVERT) 25 MG tablet One  at night  for  one  week and one  every  6  hours as  needed  for dizziness 30 tablet 0    ipratropium-albuterol (DUONEB) 0.5-2.5 (3) MG/3ML SOLN nebulizer solution Inhale 3 mLs into the lungs 3 times daily USE 1 VIAL IN NEBULIZER EVERY 6 HOURS - for shortness of breath/wheezing 120 each 8    etanercept (ENBREL) 50 MG/ML injection Inject 1 mL into the skin once a week      Probiotic Product

## 2023-05-15 ENCOUNTER — OFFICE VISIT (OUTPATIENT)
Dept: PULMONOLOGY | Age: 88
End: 2023-05-15
Payer: MEDICARE

## 2023-05-15 VITALS
SYSTOLIC BLOOD PRESSURE: 102 MMHG | HEIGHT: 62 IN | HEART RATE: 82 BPM | OXYGEN SATURATION: 92 % | DIASTOLIC BLOOD PRESSURE: 48 MMHG | BODY MASS INDEX: 28.19 KG/M2 | TEMPERATURE: 97.7 F | WEIGHT: 153.2 LBS

## 2023-05-15 DIAGNOSIS — J47.9 BRONCHIECTASIS WITHOUT COMPLICATION (HCC): ICD-10-CM

## 2023-05-15 DIAGNOSIS — J96.11 CHRONIC RESPIRATORY FAILURE WITH HYPOXIA (HCC): ICD-10-CM

## 2023-05-15 DIAGNOSIS — M05.10 RHEUMATOID LUNG (HCC): Primary | ICD-10-CM

## 2023-05-15 PROCEDURE — 1123F ACP DISCUSS/DSCN MKR DOCD: CPT | Performed by: NURSE PRACTITIONER

## 2023-05-15 PROCEDURE — 99214 OFFICE O/P EST MOD 30 MIN: CPT | Performed by: NURSE PRACTITIONER

## 2023-05-15 RX ORDER — IPRATROPIUM BROMIDE AND ALBUTEROL SULFATE 2.5; .5 MG/3ML; MG/3ML
1 SOLUTION RESPIRATORY (INHALATION) 3 TIMES DAILY
Qty: 270 ML | Refills: 11 | Status: SHIPPED | OUTPATIENT
Start: 2023-05-15

## 2023-05-15 ASSESSMENT — ENCOUNTER SYMPTOMS
VOMITING: 0
COUGH: 0
EYES NEGATIVE: 1
WHEEZING: 0
DIARRHEA: 0
ABDOMINAL PAIN: 0
NAUSEA: 0
SHORTNESS OF BREATH: 1

## 2023-05-15 NOTE — PROGRESS NOTES
[facility]     Patient: Barbara Butterfield, 80 y.o.   : 1935  5/15/2023    Pt of Dr. Scott Skelton   Patient presents with    Follow-up     1yr Rheumatoid Lung f/u w/o testing. Doing well. 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 4LPM with activity   Uses POC when away from home, on concentrator w/ continuous flow at home   Last 6 min walk:  Duoneb  3x daily   Now off methotrexate, on Enbrel per rheumatology   Joint pain \" not bad\"   On Prolia for Osteoporosis - has seen improvement in her joints since     Any recent exacerbations that have required oral atb or steroids No    Current Symptoms : Dyspnea on exertion. Symptoms are stable,     SOCIAL HISTORY:  Social History     Tobacco Use    Smoking status: Never    Smokeless tobacco: Never    Tobacco comments:     Never smoker   Vaping Use    Vaping Use: Never used   Substance Use Topics    Alcohol use:  Yes     Alcohol/week: 0.0 standard drinks     Comment: RARE    Drug use: No       CURRENT MEDICATIONS:  Current Outpatient Medications   Medication Sig Dispense Refill    ipratropium-albuterol (DUONEB) 0.5-2.5 (3) MG/3ML SOLN nebulizer solution Inhale 3 mLs into the lungs 3 times daily USE 1 VIAL IN NEBULIZER EVERY 6 HOURS - for shortness of breath/wheezing 270 mL 11    denosumab (PROLIA) 60 MG/ML SOSY SC injection Inject 1 mL into the skin every 6 months 1 mL 1    hydroCHLOROthiazide (MICROZIDE) 12.5 MG capsule take 1 capsule by mouth every morning 90 capsule 1    metoprolol succinate (TOPROL XL) 50 MG extended release tablet TAKE ONE AND ONE-HALF TABLETS DAILY 135 tablet 3    losartan (COZAAR) 50 MG tablet take 1 tablet by mouth once daily 90 tablet 1    pantoprazole

## 2023-05-25 ENCOUNTER — HOSPITAL ENCOUNTER (EMERGENCY)
Age: 88
Discharge: HOME | End: 2023-05-25
Payer: MEDICARE

## 2023-05-25 VITALS
RESPIRATION RATE: 20 BRPM | OXYGEN SATURATION: 90 % | WEIGHT: 162 LBS | TEMPERATURE: 97.2 F | HEART RATE: 85 BPM | SYSTOLIC BLOOD PRESSURE: 105 MMHG | BODY MASS INDEX: 30.58 KG/M2 | DIASTOLIC BLOOD PRESSURE: 57 MMHG | HEIGHT: 61 IN

## 2023-05-25 DIAGNOSIS — N30.00 ACUTE CYSTITIS WITHOUT HEMATURIA: Primary | ICD-10-CM

## 2023-05-25 LAB
BILIRUB UR STRIP.AUTO-MCNC: ABNORMAL MG/DL
CHARACTER UR: CLEAR
COLOR: YELLOW
GLUCOSE UR QL STRIP.AUTO: NEGATIVE MG/DL
KETONES UR QL STRIP.AUTO: 15
NITRITE UR QL STRIP.AUTO: NEGATIVE
PH UR STRIP.AUTO: 6.5 [PH] (ref 5–9)
PROT UR STRIP.AUTO-MCNC: 30 MG/DL
RBC #/AREA URNS HPF: NEGATIVE /[HPF]
SP GR UR STRIP.AUTO: 1.01 (ref 1–1.03)
UROBILINOGEN, URINE: 1 EU/DL (ref 0.2–1)
WBC #/AREA URNS HPF: ABNORMAL /[HPF]

## 2023-05-25 PROCEDURE — 99213 OFFICE O/P EST LOW 20 MIN: CPT

## 2023-05-25 PROCEDURE — 81003 URINALYSIS AUTO W/O SCOPE: CPT

## 2023-05-25 PROCEDURE — 87186 SC STD MICRODIL/AGAR DIL: CPT

## 2023-05-25 PROCEDURE — 87077 CULTURE AEROBIC IDENTIFY: CPT

## 2023-05-25 PROCEDURE — 87086 URINE CULTURE/COLONY COUNT: CPT

## 2023-05-25 RX ORDER — CEPHALEXIN 500 MG/1
500 CAPSULE ORAL 2 TIMES DAILY
Qty: 14 CAPSULE | Refills: 0 | Status: SHIPPED | OUTPATIENT
Start: 2023-05-25 | End: 2023-06-01

## 2023-05-25 ASSESSMENT — ENCOUNTER SYMPTOMS
NAUSEA: 0
ABDOMINAL PAIN: 0
VOMITING: 0

## 2023-05-25 NOTE — DISCHARGE INSTRUCTIONS
Drink lots of fluids  NO baths  Wipe front to back  Follow up with Dr Jaswant Faust   Antibiotic as prescribed

## 2023-05-25 NOTE — ED NOTES
Pt complains of lower abdominal discomfort and decrease urination for a couple of days. States she normally wears a pad, but noticed yesterday it was dry.       Med Ruth RN  05/25/23 0915

## 2023-05-25 NOTE — ED PROVIDER NOTES
[facility]  Urgent Care Encounter       CHIEF COMPLAINT       Chief Complaint   Patient presents with    Urinary Frequency       Nurses Notes reviewed and I agree except as noted in the HPI. HISTORY OF PRESENT ILLNESS   Angi Botello is a 80 y.o. female who presents with complaints of urinary discomfort and decreased urination for the last few days. Patient reports that she first noticed it last night when she did not change her pad which she normally changes several times a day due to leakage. Patient reports mild lower abdominal discomfort. The history is provided by the patient. REVIEW OF SYSTEMS     Review of Systems   Constitutional:  Negative for fatigue and fever. Gastrointestinal:  Negative for abdominal pain, nausea and vomiting. Genitourinary:  Positive for decreased urine volume and pelvic pain. Negative for dysuria, frequency, hematuria, vaginal bleeding and vaginal discharge. All other systems reviewed and are negative.     PAST MEDICAL HISTORY         Diagnosis Date    Acid reflux     Anxiety     Arrhythmia     Arthritis     Breast cancer ([address] 11/1998    left    Bronchiectasis ([address] 09/30/2015    CAD (coronary artery disease)     NON-OBSTRUCTIVE    Cancer ([address]     breast    Diabetes mellitus ([address]     Heart palpitations     HX OF:    History of therapeutic radiation 1998    left breast cancer    Hyperlipidemia     Hypertension     ILD (interstitial lung disease) ([address] 09/30/2015    w/ Bronchectasis    Osteoarthritis     rhuematoid    Osteoporosis, senile 03/09/2023    Rheumatoid arthritis ([address]     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

## 2023-05-28 LAB
BACTERIA UR CULT: ABNORMAL
ORGANISM: ABNORMAL

## 2023-06-05 RX ORDER — PANTOPRAZOLE SODIUM 40 MG/1
TABLET, DELAYED RELEASE ORAL
Qty: 90 TABLET | Refills: 1 | Status: SHIPPED | OUTPATIENT
Start: 2023-06-05

## 2023-06-05 NOTE — TELEPHONE ENCOUNTER
Date of last visit:  4/18/2023  Date of next visit:  6/6/2023    Requested Prescriptions     Pending Prescriptions Disp Refills    pantoprazole (PROTONIX) 40 MG tablet [Pharmacy Med Name: PANTOPRAZOLE SODIUM DR TABS 40MG] 90 tablet 3     Sig: TAKE 1 TABLET DAILY

## 2023-06-06 ENCOUNTER — OFFICE VISIT (OUTPATIENT)
Dept: FAMILY MEDICINE CLINIC | Age: 88
End: 2023-06-06

## 2023-06-06 VITALS
BODY MASS INDEX: 29.3 KG/M2 | RESPIRATION RATE: 16 BRPM | WEIGHT: 155.2 LBS | HEIGHT: 61 IN | SYSTOLIC BLOOD PRESSURE: 124 MMHG | HEART RATE: 80 BPM | DIASTOLIC BLOOD PRESSURE: 78 MMHG

## 2023-06-06 DIAGNOSIS — M05.10 RHEUMATOID LUNG (HCC): ICD-10-CM

## 2023-06-06 DIAGNOSIS — J84.10 PULMONARY FIBROSIS (HCC): ICD-10-CM

## 2023-06-06 DIAGNOSIS — N39.3 STRESS INCONTINENCE OF URINE: ICD-10-CM

## 2023-06-06 DIAGNOSIS — J96.10 CHRONIC RESPIRATORY FAILURE, UNSPECIFIED WHETHER WITH HYPOXIA OR HYPERCAPNIA (HCC): ICD-10-CM

## 2023-06-06 DIAGNOSIS — N39.0 RECURRENT URINARY TRACT INFECTION: Primary | ICD-10-CM

## 2023-06-06 NOTE — PROGRESS NOTES
Date: 6/7/2023    : Sergio Laguerre is a 80 y. o.female who presents today for:  Chief Complaint   Patient presents with    Follow-up         HPI:     Patient was seen in the urgent care for urinary tract infection and was given antibiotic and responding well. Patient finished antibiotic yesterday. No flank pain no shortness of breath or chest pain no dysuria frequency. Patient admits that she had urine incontinence for a while    Hyperlipidemia  Pertinent negatives include no chest pain, myalgias or shortness of breath. Diabetes  Pertinent negatives for hypoglycemia include no dizziness or headaches. Pertinent negatives for diabetes include no chest pain, no fatigue and no weakness. Hypertension  Pertinent negatives include no chest pain, headaches, neck pain, palpitations or shortness of breath. Gastroesophageal Reflux  She reports no abdominal pain, no chest pain, no coughing, no nausea, no sore throat or no wheezing. Pertinent negatives include no fatigue.      CurrentHome Medications were reviewed and updated by this Provider  Current Outpatient Medications   Medication Sig Dispense Refill    pantoprazole (PROTONIX) 40 MG tablet TAKE 1 TABLET DAILY 90 tablet 1    ipratropium-albuterol (DUONEB) 0.5-2.5 (3) MG/3ML SOLN nebulizer solution Inhale 3 mLs into the lungs 3 times daily USE 1 VIAL IN NEBULIZER EVERY 6 HOURS - for shortness of breath/wheezing 270 mL 11    denosumab (PROLIA) 60 MG/ML SOSY SC injection Inject 1 mL into the skin every 6 months 1 mL 1    hydroCHLOROthiazide (MICROZIDE) 12.5 MG capsule take 1 capsule by mouth every morning 90 capsule 1    atorvastatin (LIPITOR) 40 MG tablet TAKE 1 TABLET EVERY EVENING 90 tablet 3    etanercept (ENBREL) 50 MG/ML injection Inject 1 mL into the skin once a week      Probiotic Product (ALIGN PO) Take by mouth daily      Omega-3 Fatty Acids (FISH OIL PO) Take by mouth      OXYGEN 2 lpm at rest and to sleep and 4 lpm to ambulate (Patient taking differently:

## 2023-06-07 ENCOUNTER — HOSPITAL ENCOUNTER (OUTPATIENT)
Dept: WOMENS IMAGING | Age: 88
Discharge: HOME | End: 2023-06-07
Payer: MEDICARE

## 2023-06-07 ENCOUNTER — NURSE ONLY (OUTPATIENT)
Dept: FAMILY MEDICINE CLINIC | Age: 88
End: 2023-06-07

## 2023-06-07 DIAGNOSIS — M05.10 RHEUMATOID LUNG (HCC): ICD-10-CM

## 2023-06-07 DIAGNOSIS — Z12.31 VISIT FOR SCREENING MAMMOGRAM: ICD-10-CM

## 2023-06-07 DIAGNOSIS — R35.0 URINE FREQUENCY: Primary | ICD-10-CM

## 2023-06-07 PROCEDURE — 77063 BREAST TOMOSYNTHESIS BI: CPT

## 2023-06-07 ASSESSMENT — ENCOUNTER SYMPTOMS
WHEEZING: 0
CONSTIPATION: 0
BACK PAIN: 0
NAUSEA: 0
VOICE CHANGE: 0
TROUBLE SWALLOWING: 0
COUGH: 0
SHORTNESS OF BREATH: 0
ABDOMINAL PAIN: 0
CHEST TIGHTNESS: 0
SORE THROAT: 0
DIARRHEA: 0
SINUS PRESSURE: 0
RHINORRHEA: 0
VOMITING: 0

## 2023-06-08 LAB
APPEARANCE: CLEAR
BILIRUBIN: NEGATIVE
COLOR: YELLOW
GLUCOSE BLD-MCNC: NEGATIVE MG/DL
KETONES, URINE: NEGATIVE
LEUKOCYTE ESTERASE, URINE: NEGATIVE
NITRITE, URINE: NEGATIVE
OCCULT BLOOD,URINE: NEGATIVE
PH: 7.5 (ref 5–9)
PROTEIN, URINE: NEGATIVE
SP GRAVITY MISCELLANEOUS: 1.02 (ref 1–1.03)
UROBILINOGEN, URINE: NORMAL

## 2023-06-08 RX ORDER — IPRATROPIUM BROMIDE AND ALBUTEROL SULFATE 2.5; .5 MG/3ML; MG/3ML
SOLUTION RESPIRATORY (INHALATION)
Qty: 120 EACH | Refills: 11 | OUTPATIENT
Start: 2023-06-08

## 2023-06-15 ENCOUNTER — HOSPITAL ENCOUNTER (INPATIENT)
Age: 88
LOS: 2 days | Discharge: HOME | DRG: 196 | End: 2023-06-17
Attending: STUDENT IN AN ORGANIZED HEALTH CARE EDUCATION/TRAINING PROGRAM | Admitting: OPHTHALMOLOGY
Payer: MEDICARE

## 2023-06-15 ENCOUNTER — APPOINTMENT (OUTPATIENT)
Dept: CT IMAGING | Age: 88
DRG: 196 | End: 2023-06-15
Payer: MEDICARE

## 2023-06-15 DIAGNOSIS — R79.89 ELEVATED BRAIN NATRIURETIC PEPTIDE (BNP) LEVEL: ICD-10-CM

## 2023-06-15 DIAGNOSIS — J81.0 ACUTE PULMONARY EDEMA (HCC): Primary | ICD-10-CM

## 2023-06-15 PROBLEM — J96.21 ACUTE ON CHRONIC RESPIRATORY FAILURE WITH HYPOXIA (HCC): Status: ACTIVE | Noted: 2023-06-15

## 2023-06-15 LAB
ANION GAP SERPL CALC-SCNC: 14 MEQ/L (ref 8–16)
BACTERIA URNS QL MICRO: ABNORMAL /HPF
BASOPHILS ABSOLUTE: 0.1 THOU/MM3 (ref 0–0.1)
BASOPHILS NFR BLD AUTO: 1.1 %
BILIRUB UR QL STRIP.AUTO: NEGATIVE
BUN SERPL-MCNC: 22 MG/DL (ref 7–22)
CALCIUM SERPL-MCNC: 9.4 MG/DL (ref 8.5–10.5)
CASTS #/AREA URNS LPF: ABNORMAL /LPF
CASTS 2: ABNORMAL /LPF
CHARACTER UR: CLEAR
CHLORIDE SERPL-SCNC: 97 MEQ/L (ref 98–111)
CO2 SERPL-SCNC: 29 MEQ/L (ref 23–33)
COLOR: YELLOW
CREAT SERPL-MCNC: 0.8 MG/DL (ref 0.4–1.2)
CRYSTALS URNS MICRO: ABNORMAL
DEPRECATED RDW RBC AUTO: 48.7 FL (ref 35–45)
EOSINOPHIL NFR BLD AUTO: 4 %
EOSINOPHILS ABSOLUTE: 0.2 THOU/MM3 (ref 0–0.4)
EPITHELIAL CELLS, UA: ABNORMAL /HPF
ERYTHROCYTE [DISTWIDTH] IN BLOOD BY AUTOMATED COUNT: 13.2 % (ref 11.5–14.5)
FLUAV RNA RESP QL NAA+PROBE: NOT DETECTED
FLUBV RNA RESP QL NAA+PROBE: NOT DETECTED
GFR SERPL CREATININE-BSD FRML MDRD: > 60 ML/MIN/1.73M2
GLUCOSE BLD STRIP.AUTO-MCNC: 127 MG/DL (ref 70–108)
GLUCOSE SERPL-MCNC: 123 MG/DL (ref 70–108)
GLUCOSE UR QL STRIP.AUTO: NEGATIVE MG/DL
HCT VFR BLD AUTO: 41.7 % (ref 37–47)
HGB BLD-MCNC: 13 GM/DL (ref 12–16)
HGB UR QL STRIP.AUTO: NEGATIVE
IMM GRANULOCYTES # BLD AUTO: 0.02 THOU/MM3 (ref 0–0.07)
IMM GRANULOCYTES NFR BLD AUTO: 0.3 %
KETONES UR QL STRIP.AUTO: NEGATIVE
LACTIC ACID, SEPSIS: 0.8 MMOL/L (ref 0.5–1.9)
LYMPHOCYTES ABSOLUTE: 1.6 THOU/MM3 (ref 1–4.8)
LYMPHOCYTES NFR BLD AUTO: 25.8 %
MAGNESIUM SERPL-MCNC: 2.9 MG/DL (ref 1.6–2.4)
MCH RBC QN AUTO: 31 PG (ref 26–33)
MCHC RBC AUTO-ENTMCNC: 31.2 GM/DL (ref 32.2–35.5)
MCV RBC AUTO: 99.5 FL (ref 81–99)
MISCELLANEOUS 2: ABNORMAL
MONOCYTES ABSOLUTE: 0.6 THOU/MM3 (ref 0.4–1.3)
MONOCYTES NFR BLD AUTO: 9.6 %
NEUTROPHILS NFR BLD AUTO: 59.2 %
NITRITE UR QL STRIP: NEGATIVE
NRBC BLD AUTO-RTO: 0 /100 WBC
NT-PROBNP SERPL IA-MCNC: 906.9 PG/ML (ref 0–449)
OSMOLALITY SERPL CALC.SUM OF ELEC: 284.1 MOSMOL/KG (ref 275–300)
PH UR STRIP.AUTO: 7 [PH] (ref 5–9)
PLATELET # BLD AUTO: 159 THOU/MM3 (ref 130–400)
PMV BLD AUTO: 9.3 FL (ref 9.4–12.4)
POTASSIUM SERPL-SCNC: 4.1 MEQ/L (ref 3.5–5.2)
PROCALCITONIN SERPL IA-MCNC: 0.05 NG/ML (ref 0.01–0.09)
PROT UR STRIP.AUTO-MCNC: NEGATIVE MG/DL
RBC # BLD AUTO: 4.19 MILL/MM3 (ref 4.2–5.4)
RBC URINE: ABNORMAL /HPF
RENAL EPI CELLS #/AREA URNS HPF: ABNORMAL /[HPF]
SARS-COV-2 RNA RESP QL NAA+PROBE: NOT DETECTED
SEGMENTED NEUTROPHILS ABSOLUTE COUNT: 3.7 THOU/MM3 (ref 1.8–7.7)
SODIUM SERPL-SCNC: 140 MEQ/L (ref 135–145)
SP GR UR REFRACT.AUTO: 1.02 (ref 1–1.03)
TROPONIN T: < 0.01 NG/ML
UROBILINOGEN, URINE: 1 EU/DL (ref 0–1)
WBC # BLD AUTO: 6.2 THOU/MM3 (ref 4.8–10.8)
WBC #/AREA URNS HPF: ABNORMAL /HPF
WBC #/AREA URNS HPF: ABNORMAL /[HPF]
YEAST LIKE FUNGI URNS QL MICRO: ABNORMAL

## 2023-06-15 PROCEDURE — 2700000000 HC OXYGEN THERAPY PER DAY

## 2023-06-15 PROCEDURE — 6370000000 HC RX 637 (ALT 250 FOR IP)

## 2023-06-15 PROCEDURE — 87040 BLOOD CULTURE FOR BACTERIA: CPT

## 2023-06-15 PROCEDURE — 99285 EMERGENCY DEPT VISIT HI MDM: CPT

## 2023-06-15 PROCEDURE — 80048 BASIC METABOLIC PNL TOTAL CA: CPT

## 2023-06-15 PROCEDURE — 83605 ASSAY OF LACTIC ACID: CPT

## 2023-06-15 PROCEDURE — 87636 SARSCOV2 & INF A&B AMP PRB: CPT

## 2023-06-15 PROCEDURE — 93005 ELECTROCARDIOGRAM TRACING: CPT | Performed by: STUDENT IN AN ORGANIZED HEALTH CARE EDUCATION/TRAINING PROGRAM

## 2023-06-15 PROCEDURE — 99223 1ST HOSP IP/OBS HIGH 75: CPT

## 2023-06-15 PROCEDURE — 93010 ELECTROCARDIOGRAM REPORT: CPT | Performed by: NUCLEAR MEDICINE

## 2023-06-15 PROCEDURE — 36415 COLL VENOUS BLD VENIPUNCTURE: CPT

## 2023-06-15 PROCEDURE — 71275 CT ANGIOGRAPHY CHEST: CPT

## 2023-06-15 PROCEDURE — 1200000003 HC TELEMETRY R&B

## 2023-06-15 PROCEDURE — 84484 ASSAY OF TROPONIN QUANT: CPT

## 2023-06-15 PROCEDURE — 6360000004 HC RX CONTRAST MEDICATION: Performed by: STUDENT IN AN ORGANIZED HEALTH CARE EDUCATION/TRAINING PROGRAM

## 2023-06-15 PROCEDURE — 2500000003 HC RX 250 WO HCPCS: Performed by: STUDENT IN AN ORGANIZED HEALTH CARE EDUCATION/TRAINING PROGRAM

## 2023-06-15 PROCEDURE — 83735 ASSAY OF MAGNESIUM: CPT

## 2023-06-15 PROCEDURE — 94640 AIRWAY INHALATION TREATMENT: CPT

## 2023-06-15 PROCEDURE — 99215 OFFICE O/P EST HI 40 MIN: CPT

## 2023-06-15 PROCEDURE — 82948 REAGENT STRIP/BLOOD GLUCOSE: CPT

## 2023-06-15 PROCEDURE — 99213 OFFICE O/P EST LOW 20 MIN: CPT | Performed by: NURSE PRACTITIONER

## 2023-06-15 PROCEDURE — 83880 ASSAY OF NATRIURETIC PEPTIDE: CPT

## 2023-06-15 PROCEDURE — 84145 PROCALCITONIN (PCT): CPT

## 2023-06-15 PROCEDURE — 81001 URINALYSIS AUTO W/SCOPE: CPT

## 2023-06-15 PROCEDURE — 85025 COMPLETE CBC W/AUTO DIFF WBC: CPT

## 2023-06-15 RX ORDER — SODIUM CHLORIDE 0.9 % (FLUSH) 0.9 %
5-40 SYRINGE (ML) INJECTION PRN
Status: DISCONTINUED | OUTPATIENT
Start: 2023-06-15 | End: 2023-06-17 | Stop reason: HOSPADM

## 2023-06-15 RX ORDER — POTASSIUM CHLORIDE 20 MEQ/1
40 TABLET, EXTENDED RELEASE ORAL PRN
Status: DISCONTINUED | OUTPATIENT
Start: 2023-06-15 | End: 2023-06-17 | Stop reason: HOSPADM

## 2023-06-15 RX ORDER — FOLIC ACID 1 MG/1
1 TABLET ORAL DAILY
Status: DISCONTINUED | OUTPATIENT
Start: 2023-06-16 | End: 2023-06-17 | Stop reason: HOSPADM

## 2023-06-15 RX ORDER — POLYETHYLENE GLYCOL 3350 17 G/17G
17 POWDER, FOR SOLUTION ORAL DAILY PRN
Status: DISCONTINUED | OUTPATIENT
Start: 2023-06-15 | End: 2023-06-17 | Stop reason: HOSPADM

## 2023-06-15 RX ORDER — ATORVASTATIN CALCIUM 40 MG/1
40 TABLET, FILM COATED ORAL EVERY EVENING
Status: DISCONTINUED | OUTPATIENT
Start: 2023-06-15 | End: 2023-06-17 | Stop reason: HOSPADM

## 2023-06-15 RX ORDER — ASPIRIN 81 MG/1
81 TABLET ORAL DAILY
Status: DISCONTINUED | OUTPATIENT
Start: 2023-06-16 | End: 2023-06-17 | Stop reason: HOSPADM

## 2023-06-15 RX ORDER — ACETAMINOPHEN 325 MG/1
650 TABLET ORAL EVERY 6 HOURS PRN
Status: DISCONTINUED | OUTPATIENT
Start: 2023-06-15 | End: 2023-06-17 | Stop reason: HOSPADM

## 2023-06-15 RX ORDER — ONDANSETRON 2 MG/ML
4 INJECTION INTRAMUSCULAR; INTRAVENOUS EVERY 6 HOURS PRN
Status: DISCONTINUED | OUTPATIENT
Start: 2023-06-15 | End: 2023-06-17 | Stop reason: HOSPADM

## 2023-06-15 RX ORDER — PANTOPRAZOLE SODIUM 40 MG/1
40 TABLET, DELAYED RELEASE ORAL
Status: DISCONTINUED | OUTPATIENT
Start: 2023-06-16 | End: 2023-06-17 | Stop reason: HOSPADM

## 2023-06-15 RX ORDER — VITAMIN B COMPLEX
100 TABLET ORAL DAILY
Status: DISCONTINUED | OUTPATIENT
Start: 2023-06-16 | End: 2023-06-15 | Stop reason: RX

## 2023-06-15 RX ORDER — SODIUM CHLORIDE 0.9 % (FLUSH) 0.9 %
5-40 SYRINGE (ML) INJECTION EVERY 12 HOURS SCHEDULED
Status: DISCONTINUED | OUTPATIENT
Start: 2023-06-15 | End: 2023-06-17 | Stop reason: HOSPADM

## 2023-06-15 RX ORDER — BUMETANIDE 0.25 MG/ML
0.5 INJECTION INTRAMUSCULAR; INTRAVENOUS ONCE
Status: COMPLETED | OUTPATIENT
Start: 2023-06-15 | End: 2023-06-15

## 2023-06-15 RX ORDER — IPRATROPIUM BROMIDE AND ALBUTEROL SULFATE 2.5; .5 MG/3ML; MG/3ML
1 SOLUTION RESPIRATORY (INHALATION) 4 TIMES DAILY
Status: DISCONTINUED | OUTPATIENT
Start: 2023-06-15 | End: 2023-06-17

## 2023-06-15 RX ORDER — HYDROCHLOROTHIAZIDE 12.5 MG/1
12.5 CAPSULE, GELATIN COATED ORAL EVERY MORNING
Status: DISCONTINUED | OUTPATIENT
Start: 2023-06-16 | End: 2023-06-17 | Stop reason: HOSPADM

## 2023-06-15 RX ORDER — INSULIN LISPRO 100 [IU]/ML
0-4 INJECTION, SOLUTION INTRAVENOUS; SUBCUTANEOUS NIGHTLY
Status: DISCONTINUED | OUTPATIENT
Start: 2023-06-15 | End: 2023-06-17 | Stop reason: HOSPADM

## 2023-06-15 RX ORDER — POTASSIUM CHLORIDE 7.45 MG/ML
10 INJECTION INTRAVENOUS PRN
Status: DISCONTINUED | OUTPATIENT
Start: 2023-06-15 | End: 2023-06-17 | Stop reason: HOSPADM

## 2023-06-15 RX ORDER — INSULIN LISPRO 100 [IU]/ML
0-4 INJECTION, SOLUTION INTRAVENOUS; SUBCUTANEOUS
Status: DISCONTINUED | OUTPATIENT
Start: 2023-06-16 | End: 2023-06-17 | Stop reason: HOSPADM

## 2023-06-15 RX ORDER — MAGNESIUM SULFATE IN WATER 40 MG/ML
2000 INJECTION, SOLUTION INTRAVENOUS PRN
Status: DISCONTINUED | OUTPATIENT
Start: 2023-06-15 | End: 2023-06-17 | Stop reason: HOSPADM

## 2023-06-15 RX ORDER — ENOXAPARIN SODIUM 100 MG/ML
40 INJECTION SUBCUTANEOUS DAILY
Status: DISCONTINUED | OUTPATIENT
Start: 2023-06-16 | End: 2023-06-17 | Stop reason: HOSPADM

## 2023-06-15 RX ORDER — DEXTROSE MONOHYDRATE 100 MG/ML
INJECTION, SOLUTION INTRAVENOUS CONTINUOUS PRN
Status: DISCONTINUED | OUTPATIENT
Start: 2023-06-15 | End: 2023-06-17 | Stop reason: HOSPADM

## 2023-06-15 RX ORDER — ACETAMINOPHEN 650 MG/1
650 SUPPOSITORY RECTAL EVERY 6 HOURS PRN
Status: DISCONTINUED | OUTPATIENT
Start: 2023-06-15 | End: 2023-06-17 | Stop reason: HOSPADM

## 2023-06-15 RX ORDER — LOSARTAN POTASSIUM 50 MG/1
50 TABLET ORAL AS NEEDED
Status: ON HOLD | COMMUNITY
End: 2023-06-17 | Stop reason: HOSPADM

## 2023-06-15 RX ORDER — ONDANSETRON 4 MG/1
4 TABLET, ORALLY DISINTEGRATING ORAL EVERY 8 HOURS PRN
Status: DISCONTINUED | OUTPATIENT
Start: 2023-06-15 | End: 2023-06-17 | Stop reason: HOSPADM

## 2023-06-15 RX ORDER — SODIUM CHLORIDE 9 MG/ML
INJECTION, SOLUTION INTRAVENOUS PRN
Status: DISCONTINUED | OUTPATIENT
Start: 2023-06-15 | End: 2023-06-17 | Stop reason: HOSPADM

## 2023-06-15 RX ADMIN — BUMETANIDE 0.5 MG: 0.25 INJECTION INTRAMUSCULAR; INTRAVENOUS at 20:19

## 2023-06-15 RX ADMIN — ATORVASTATIN CALCIUM 40 MG: 40 TABLET, FILM COATED ORAL at 23:12

## 2023-06-15 RX ADMIN — IPRATROPIUM BROMIDE AND ALBUTEROL SULFATE 1 DOSE: .5; 3 SOLUTION RESPIRATORY (INHALATION) at 22:21

## 2023-06-15 RX ADMIN — Medication 0.5 TABLET: at 23:11

## 2023-06-15 RX ADMIN — IOPAMIDOL 80 ML: 755 INJECTION, SOLUTION INTRAVENOUS at 18:44

## 2023-06-15 ASSESSMENT — ENCOUNTER SYMPTOMS
NAUSEA: 0
SHORTNESS OF BREATH: 0
SORE THROAT: 0
CHEST TIGHTNESS: 0
COUGH: 1

## 2023-06-15 ASSESSMENT — PAIN - FUNCTIONAL ASSESSMENT
PAIN_FUNCTIONAL_ASSESSMENT: NONE - DENIES PAIN
PAIN_FUNCTIONAL_ASSESSMENT: NONE - DENIES PAIN

## 2023-06-15 NOTE — ED NOTES
Pt arrived from [address] [place] [address] urgent care with c/o of head congestion. Report from urgent care noted frequent PVCs on EKG. Pt sent to ER for further evaluation  EKG performed at this time. Pt states \" I am just not feeling right\" Pt denies SOB and chest pain. Pt normally wears 4 liters at home. Respirations easy and unlabored. Pt resting comfortably on cot.      [facility], RN  06/15/23 [facility], RN  06/15/23 0663 No

## 2023-06-15 NOTE — ED NOTES
ED to inpatient nurses report    Chief Complaint   Patient presents with    Head Congestion    Dizziness      Present to ED from home  LOC: alert and orientated to name, place, date  Vital signs   Vitals:    06/15/23 1707 06/15/23 1711 06/15/23 1737 06/15/23 1910   BP:  (!) 133/46  (!) 155/71   Pulse: 73 64  72   Resp:    18   Temp: 97.8 °F (36.6 °C)      TempSrc: Oral      SpO2:   98% 99%   Weight:          Oxygen Baseline RA    Current needs required 3L NC   LDAs:   Peripheral IV 06/15/23 Left Antecubital (Active)   Site Assessment Clean, dry & intact 06/15/23 1710   Line Status Flushed 06/15/23 [address] Connections checked and tightened 06/15/23 1710   Phlebitis Assessment No symptoms 06/15/23 1710   Infiltration Assessment 0 06/15/23 1710   Alcohol Cap Used Yes 06/15/23 1710     Mobility: Requires assistance * 1  Pending ED orders: NA  Present condition: resting on cot with family at bedside.       C-SSRS Risk of Suicide: No Risk  Swallow Screening    Preferred Language: English     Electronically signed by Devang Smith RN on 6/15/2023 at 7:22 PM       Devang Smith RN  06/15/23 1923

## 2023-06-15 NOTE — ED NOTES
Sepsis Times and Checklist  Vital Signs: BP: (!) 133/46  Pulse: 64  Respirations: 22  Temp: 97.8 °F (36.6 °C) SpO2: 98 %  WBC   Date Value Ref Range Status   01/31/2023 7.8 10^3/mL      Lactic Acid   Date Value Ref Range Status   06/06/2016 1.1 0.5 - 2.2 mmol/l Final     Comment:     Performed at [address] [place] [address], [address]       SIRS Criteria Respiratory Rate >20 or PaCO2 <32      Sepsis BPA Score = 6.57    Sepsis Screening Completed Yes    Provider Notified: yes      Await Treating Provider's Assessment. Time Sepsis Screening Positive 1738    Blood Cultures Yes        'Lactate, Sepsis' ordered: Yes      30 ml/kg fluid Bolus indication Not Present at this time      Fluid Bolus started at - not ordered.            Flora Perdue RN  06/15/23 [id number]

## 2023-06-15 NOTE — H&P
there, she received an EKG, and due to the rhythm they sent her to the ER for further evaluation. She reports feeling well and not having any issues. Denies any fever/chills, chest pain, shortness of breath, lightheadedness/dizziness, abdominal pain, nausea/vomiting, urinary symptoms, and leg swelling. Review of Systems: Pertinent positives as noted in the HPI. All other systems reviewed and negative. Past Medical History:        Diagnosis Date    Acid reflux     Anxiety     Arrhythmia     Arthritis     Breast cancer ([facility] [address] 11/1998    left DCIS    Bronchiectasis ([facility] [address] 09/30/2015    CAD (coronary artery disease)     NON-OBSTRUCTIVE    Diabetes mellitus ([address]     Heart palpitations     HX OF:    History of therapeutic radiation 1998    left breast cancer    Hyperlipidemia     Hypertension     ILD (interstitial lung disease) ([facility] [address] 09/30/2015    w/ Bronchectasis    Osteoarthritis     rhuematoid    Osteoporosis, senile 03/09/2023    Rheumatoid arthritis ([facility] [address]     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 ([facility] [address] 09/30/2015       Past Surgical History:        Procedure Laterality Date    ABDOMINAL HERNIA REPAIR  9/2013    BREAST LUMPECTOMY  12/10/1998    CARDIOVASCULAR STRESS TEST  06/17/2010    EF 71%    CARDIOVASCULAR STRESS TEST  01/28/2008    EF 60%    CARDIOVASCULAR STRESS TEST  01/22/2005    CATARACT REMOVAL Bilateral     Dr Alyssa Leslie - 6/27/17 right, 7/10/17 left    CATARACT REMOVAL      CHOLECYSTECTOMY  09/20/2008    [facility]     COLONOSCOPY      HAMMER TOE SURGERY Left 09/2018    HERNIA REPAIR  07/01/2010    [facility]    HIATAL HERNIA REPAIR  09/15/2009    [facility]    JOINT REPLACEMENT  5/29/02    left knee    TONSILLECTOMY AND ADENOIDECTOMY  1945    TOTAL KNEE ARTHROPLASTY Left 2002    TRANSTHORACIC ECHOCARDIOGRAM  01/28/2005    EF 70%    UPPER GASTROINTESTINAL ENDOSCOPY  03/21/2017       Home Medications:   No current facility-administered medications on file prior to encounter.      Current

## 2023-06-15 NOTE — ED TRIAGE NOTES
Patient ambulated to room with use of walker. C/o head congestion and c/o bilateral eye drainage. States, \"I just don't feel right. Something is wrong. \" Denies shortness of breath or chest pain. Patient presented at 84% SpO2 with use of concentrator, brought from home. Oxygen applied via nasal cannula. 95% at this time. EKG completed. Provider notified.

## 2023-06-15 NOTE — ED PROVIDER NOTES
[facility]  Urgent Care Encounter       CHIEF COMPLAINT       Chief Complaint   Patient presents with    Head Congestion       Nurses Notes reviewed and I agree except as noted in the HPI. HISTORY OF PRESENT ILLNESS   Susy Fierro is a 80 y.o. female who presents with complaints of congestion and cough for the past 2 days. Patient reports \"something is wrong\" and she \" does not feel right\". She is on home oxygen and initially presented with her transport tank with an oxygen saturation of 84%. She denies any shortness of breath. She was switched to 3 L nasal cannula via tanks at urgent care and quickly improved to 94%. She denies any chest pain. Believes she has a sinus infection. The history is provided by the patient. REVIEW OF SYSTEMS     Review of Systems   Constitutional:  Positive for fatigue. Negative for chills and fever. General malaise   HENT:  Positive for congestion. Negative for sore throat. Respiratory:  Positive for cough. Negative for chest tightness and shortness of breath. Cardiovascular:  Negative for chest pain and palpitations. Gastrointestinal:  Negative for nausea. Musculoskeletal:  Negative for myalgias. Neurological:  Negative for headaches. Psychiatric/Behavioral:  Negative for confusion.       PAST MEDICAL HISTORY         Diagnosis Date    Acid reflux     Anxiety     Arrhythmia     Arthritis     Breast cancer ([address] 11/1998    left DCIS    Bronchiectasis ([address] 09/30/2015    CAD (coronary artery disease)     NON-OBSTRUCTIVE    Diabetes mellitus ([address]     Heart palpitations     HX OF:    History of therapeutic radiation 1998    left breast cancer    Hyperlipidemia     Hypertension     ILD (interstitial lung disease) ([address] 09/30/2015    w/ Bronchectasis    Osteoarthritis     rhuematoid    Osteoporosis, senile 03/09/2023    Rheumatoid arthritis ([address]     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
Thu Azael 15, 2023   1838 EKG no STEMI, similar to prior EKG from January 28, 2023 but now has PVC [CR]   1907 CTA:IMPRESSION:  1. No pulmonary emboli are seen. Extensive interstitial fibrosis with superimposed interstitial pneumonia/edema bilaterally. Cannot exclude heart failure. 2. Small bilateral effusions. Prior gastric surgery. Prior cholecystectomy. [CR]   1908 Bumex ordered [CR]   1916 Calvin Arellano [place] for admission [CR]   1916 Patient no longer in bigeminy; no PVCs seen on telemetry; patient appears to be in sinus rhythm.  [CR]      ED Course User Index  [CR] Nancy Dent MD       ED COURSE:  ED Course as of 06/15/23 2201   Thu Azael 15, 2023   1838 EKG no STEMI, similar to prior EKG from January 28, 2023 but now has PVC [CR]   1907 CTA:IMPRESSION:  1. No pulmonary emboli are seen. Extensive interstitial fibrosis with superimposed interstitial pneumonia/edema bilaterally. Cannot exclude heart failure. 2. Small bilateral effusions. Prior gastric surgery. Prior cholecystectomy.  [CR]   1908 Bumex ordered [CR]   1916 Julia Judgema for admission [CR]   1916 Patient no longer in bigeminy; no PVCs seen on telemetry; patient appears to be in sinus rhythm.  [CR]      ED Course User Index  [CR] Nancy Dent MD                 ED Medications administered this visit:  (None if blank)  Medications   aspirin EC tablet 81 mg (has no administration in time range)   atorvastatin (LIPITOR) tablet 40 mg (has no administration in time range)   oyster shell calcium w/D 500-5 MG-MCG tablet 0.5 tablet (has no administration in time range)   folic acid (FOLVITE) tablet 1 mg (has no administration in time range)   hydroCHLOROthiazide (MICROZIDE) capsule 12.5 mg (has no administration in time range)   ipratropium 0.5 mg-albuterol 2.5 mg (DUONEB) nebulizer solution 1 Dose (has no administration in time range)   pantoprazole (PROTONIX) tablet 40 mg (has no administration in time range)   sodium

## 2023-06-16 LAB
ANION GAP SERPL CALC-SCNC: 13 MEQ/L (ref 8–16)
BASOPHILS ABSOLUTE: 0.1 THOU/MM3 (ref 0–0.1)
BASOPHILS NFR BLD AUTO: 1 %
BUN SERPL-MCNC: 20 MG/DL (ref 7–22)
CALCIUM SERPL-MCNC: 9.3 MG/DL (ref 8.5–10.5)
CHLORIDE SERPL-SCNC: 97 MEQ/L (ref 98–111)
CO2 SERPL-SCNC: 30 MEQ/L (ref 23–33)
CREAT SERPL-MCNC: 0.8 MG/DL (ref 0.4–1.2)
DEPRECATED RDW RBC AUTO: 47.2 FL (ref 35–45)
EOSINOPHIL NFR BLD AUTO: 5 %
EOSINOPHILS ABSOLUTE: 0.3 THOU/MM3 (ref 0–0.4)
ERYTHROCYTE [DISTWIDTH] IN BLOOD BY AUTOMATED COUNT: 13.2 % (ref 11.5–14.5)
GFR SERPL CREATININE-BSD FRML MDRD: > 60 ML/MIN/1.73M2
GLUCOSE BLD STRIP.AUTO-MCNC: 139 MG/DL (ref 70–108)
GLUCOSE BLD STRIP.AUTO-MCNC: 170 MG/DL (ref 70–108)
GLUCOSE BLD STRIP.AUTO-MCNC: 183 MG/DL (ref 70–108)
GLUCOSE SERPL-MCNC: 96 MG/DL (ref 70–108)
HCT VFR BLD AUTO: 39 % (ref 37–47)
HGB BLD-MCNC: 12.5 GM/DL (ref 12–16)
IMM GRANULOCYTES # BLD AUTO: 0.02 THOU/MM3 (ref 0–0.07)
IMM GRANULOCYTES NFR BLD AUTO: 0.3 %
LV EF: 60 %
LVEF MODALITY: NORMAL
LYMPHOCYTES ABSOLUTE: 1.5 THOU/MM3 (ref 1–4.8)
LYMPHOCYTES NFR BLD AUTO: 25.6 %
MCH RBC QN AUTO: 31.5 PG (ref 26–33)
MCHC RBC AUTO-ENTMCNC: 32.1 GM/DL (ref 32.2–35.5)
MCV RBC AUTO: 98.2 FL (ref 81–99)
MONOCYTES ABSOLUTE: 0.7 THOU/MM3 (ref 0.4–1.3)
MONOCYTES NFR BLD AUTO: 11.5 %
NEUTROPHILS NFR BLD AUTO: 56.6 %
NRBC BLD AUTO-RTO: 0 /100 WBC
PLATELET # BLD AUTO: 141 THOU/MM3 (ref 130–400)
PMV BLD AUTO: 9.5 FL (ref 9.4–12.4)
POTASSIUM SERPL-SCNC: 3.9 MEQ/L (ref 3.5–5.2)
RBC # BLD AUTO: 3.97 MILL/MM3 (ref 4.2–5.4)
SEGMENTED NEUTROPHILS ABSOLUTE COUNT: 3.3 THOU/MM3 (ref 1.8–7.7)
SODIUM SERPL-SCNC: 140 MEQ/L (ref 135–145)
WBC # BLD AUTO: 5.9 THOU/MM3 (ref 4.8–10.8)

## 2023-06-16 PROCEDURE — 2700000000 HC OXYGEN THERAPY PER DAY

## 2023-06-16 PROCEDURE — 6370000000 HC RX 637 (ALT 250 FOR IP)

## 2023-06-16 PROCEDURE — 99231 SBSQ HOSP IP/OBS SF/LOW 25: CPT | Performed by: STUDENT IN AN ORGANIZED HEALTH CARE EDUCATION/TRAINING PROGRAM

## 2023-06-16 PROCEDURE — 93010 ELECTROCARDIOGRAM REPORT: CPT | Performed by: NUCLEAR MEDICINE

## 2023-06-16 PROCEDURE — 2580000003 HC RX 258

## 2023-06-16 PROCEDURE — 80048 BASIC METABOLIC PNL TOTAL CA: CPT

## 2023-06-16 PROCEDURE — 85025 COMPLETE CBC W/AUTO DIFF WBC: CPT

## 2023-06-16 PROCEDURE — 94669 MECHANICAL CHEST WALL OSCILL: CPT

## 2023-06-16 PROCEDURE — 93005 ELECTROCARDIOGRAM TRACING: CPT

## 2023-06-16 PROCEDURE — 94640 AIRWAY INHALATION TREATMENT: CPT

## 2023-06-16 PROCEDURE — 36415 COLL VENOUS BLD VENIPUNCTURE: CPT

## 2023-06-16 PROCEDURE — 93306 TTE W/DOPPLER COMPLETE: CPT

## 2023-06-16 PROCEDURE — 93005 ELECTROCARDIOGRAM TRACING: CPT | Performed by: STUDENT IN AN ORGANIZED HEALTH CARE EDUCATION/TRAINING PROGRAM

## 2023-06-16 PROCEDURE — 97162 PT EVAL MOD COMPLEX 30 MIN: CPT

## 2023-06-16 PROCEDURE — 97110 THERAPEUTIC EXERCISES: CPT

## 2023-06-16 PROCEDURE — 1200000003 HC TELEMETRY R&B

## 2023-06-16 PROCEDURE — 82948 REAGENT STRIP/BLOOD GLUCOSE: CPT

## 2023-06-16 RX ADMIN — SODIUM CHLORIDE, PRESERVATIVE FREE 10 ML: 5 INJECTION INTRAVENOUS at 08:45

## 2023-06-16 RX ADMIN — FOLIC ACID 1 MG: 1 TABLET ORAL at 08:44

## 2023-06-16 RX ADMIN — IPRATROPIUM BROMIDE AND ALBUTEROL SULFATE 1 DOSE: .5; 3 SOLUTION RESPIRATORY (INHALATION) at 20:40

## 2023-06-16 RX ADMIN — IPRATROPIUM BROMIDE AND ALBUTEROL SULFATE 1 DOSE: .5; 3 SOLUTION RESPIRATORY (INHALATION) at 16:13

## 2023-06-16 RX ADMIN — IPRATROPIUM BROMIDE AND ALBUTEROL SULFATE 1 DOSE: .5; 3 SOLUTION RESPIRATORY (INHALATION) at 09:51

## 2023-06-16 RX ADMIN — ATORVASTATIN CALCIUM 40 MG: 40 TABLET, FILM COATED ORAL at 17:12

## 2023-06-16 RX ADMIN — Medication 0.5 TABLET: at 20:57

## 2023-06-16 RX ADMIN — Medication 0.5 TABLET: at 08:44

## 2023-06-16 RX ADMIN — SODIUM CHLORIDE, PRESERVATIVE FREE 10 ML: 5 INJECTION INTRAVENOUS at 20:59

## 2023-06-16 RX ADMIN — ASPIRIN 81 MG: 81 TABLET, COATED ORAL at 08:48

## 2023-06-16 RX ADMIN — SODIUM CHLORIDE, PRESERVATIVE FREE 10 ML: 5 INJECTION INTRAVENOUS at 00:58

## 2023-06-16 RX ADMIN — PANTOPRAZOLE SODIUM 40 MG: 40 TABLET, DELAYED RELEASE ORAL at 05:42

## 2023-06-16 NOTE — ED NOTES
Pt to bathroom via wheelchair and tolerated well. Pt updated on bed status and denies a need for anything else at this time.      Brandon Bustos RN  06/15/23 2025

## 2023-06-16 NOTE — PLAN OF CARE
Problem: Metabolic/Fluid and Electrolytes - Adult  Goal: Hemodynamic stability and optimal renal function maintained  Flowsheets (Taken 6/16/2023 0050)  Hemodynamic stability and optimal renal function maintained:   Monitor labs and assess for signs and symptoms of volume excess or deficit   Monitor intake, output and patient weight   Monitor urine specific gravity, serum osmolarity and serum sodium as indicated or ordered   Monitor response to interventions for patient's volume status, including labs, urine output, blood pressure (other measures as available)   Encourage oral intake as appropriate   Instruct patient on fluid and nutrition restrictions as appropriate     Problem: Metabolic/Fluid and Electrolytes - Adult  Goal: Electrolytes maintained within normal limits  Flowsheets (Taken 6/16/2023 0050)  Electrolytes maintained within normal limits:   Monitor labs and assess patient for signs and symptoms of electrolyte imbalances   Administer electrolyte replacement as ordered   Monitor response to electrolyte replacements, including repeat lab results as appropriate   Fluid restriction as ordered   Instruct patient on fluid and nutrition restrictions as appropriate     Problem: Respiratory - Adult  Goal: Achieves optimal ventilation and oxygenation  Flowsheets (Taken 6/16/2023 0050)  Achieves optimal ventilation and oxygenation:   Assess for changes in respiratory status   Assess for changes in mentation and behavior   Position to facilitate oxygenation and minimize respiratory effort   Oxygen supplementation based on oxygen saturation or arterial blood gases   Encourage broncho-pulmonary hygiene including cough, deep breathe, incentive spirometry     Problem: Safety - Adult  Goal: Free from fall injury  Recent Flowsheet Documentation  Taken 6/15/2023 2256 by Drew Luna RN  Free From Fall Injury: Instruct family/caregiver on patient safety     Problem: Discharge Planning  Goal: Discharge to home or other

## 2023-06-16 NOTE — RT PROTOCOL NOTE
RT Inhaler-Nebulizer Bronchodilator Protocol Note    There is a bronchodilator order in the chart from a provider indicating to follow the RT Bronchodilator Protocol and there is an Initiate RT Inhaler-Nebulizer Bronchodilator Protocol order as well (see protocol at bottom of note). CXR Findings:  No results found. The findings from the last RT Protocol Assessment were as follows:   History Pulmonary Disease: None or smoker <15 pack years  Respiratory Pattern: Regular pattern and RR 12-20 bpm  Breath Sounds: Slightly diminished and/or crackles  Cough: Strong, productive  Indication for Bronchodilator Therapy: On home bronchodilators (3x a day)  Bronchodilator Assessment Score: 3    Aerosolized bronchodilator medication orders have been revised according to the RT Inhaler-Nebulizer Bronchodilator Protocol below. Respiratory Therapist to perform RT Therapy Protocol Assessment initially then follow the protocol. Repeat RT Therapy Protocol Assessment PRN for score 0-3 or on second treatment, BID, and PRN for scores above 3. No Indications - adjust the frequency to every 6 hours PRN wheezing or bronchospasm, if no treatments needed after 48 hours then discontinue using Per Protocol order mode. If indication present, adjust the RT bronchodilator orders based on the Bronchodilator Assessment Score as indicated below. Use Inhaler orders unless patient has one or more of the following: on home nebulizer, not able to hold breath for 10 seconds, is not alert and oriented, cannot activate and use MDI correctly, or respiratory rate 25 breaths per minute or more, then use the equivalent nebulizer order(s) with same Frequency and PRN reasons based on the score. If a patient is on this medication at home then do not decrease Frequency below that used at home.     0-3 - enter or revise RT bronchodilator order(s) to equivalent RT Bronchodilator order with Frequency of every 4 hours PRN for wheezing or increased

## 2023-06-16 NOTE — CARE COORDINATION
Case Management Assessment  Initial Evaluation    Date/Time of Evaluation: 6/16/2023 10:44 AM  Assessment Completed by: Justino Romero RN    If patient is discharged prior to next notation, then this note serves as note for discharge by case management. Patient Name: Jakob Holman                   YOB: 1935  Diagnosis: Acute pulmonary edema ([facility] [address] [J81.0]  Elevated brain natriuretic peptide (BNP) level [R79.89]  Acute on chronic respiratory failure with hypoxia ([facility] [address] [J96.21]                   Date / Time: 6/15/2023  4:15 PM  Location: [address] [place] [address]     Patient Admission Status: Inpatient   Readmission Risk Low 0-14, Mod 15-19), High > 20: Readmission Risk Score: 10.1    Current PCP: Abida Carlisle MD  PCP verified by ? Yes    Chart Reviewed: Yes      History Provided by: Patient  Patient Orientation: Alert and Oriented    Patient Cognition: Alert    Hospitalization in the last 30 days (Readmission):  No    If yes, Readmission Assessment in  Navigator will be completed. Advance Directives:      Code Status: Full Code   Patient's Primary Decision Maker is: Legal Next of Kin      Discharge Planning:    Patient lives with: Alone Type of Home: Other (Comment) (Condo)  Primary Care Giver: Self  Patient Support Systems include: Children, Family Members   Current Financial resources: Medicare  Current community resources: None  Current services prior to admission: Oxygen Therapy, Durable Medical Equipment (O2 per [facility] 3-4 L.)            Current DME: Oxygen Therapy (Comment), Shower Chair (Rollator)            Type of Home Care services:  None    ADLS  Prior functional level: Assistance with the following:, Housework, Shopping  Current functional level: Assistance with the following:, Housework, Shopping    Family can provide assistance at DC: No  Would you like Case Management to discuss the discharge plan with any other family members/significant others, and if so, who?  No  Plans to

## 2023-06-17 VITALS
HEIGHT: 61 IN | BODY MASS INDEX: 28.83 KG/M2 | WEIGHT: 152.7 LBS | HEART RATE: 98 BPM | OXYGEN SATURATION: 98 % | RESPIRATION RATE: 18 BRPM | TEMPERATURE: 97.8 F | SYSTOLIC BLOOD PRESSURE: 102 MMHG | DIASTOLIC BLOOD PRESSURE: 60 MMHG

## 2023-06-17 LAB
GLUCOSE BLD STRIP.AUTO-MCNC: 116 MG/DL (ref 70–108)
GLUCOSE BLD STRIP.AUTO-MCNC: 153 MG/DL (ref 70–108)

## 2023-06-17 PROCEDURE — 94669 MECHANICAL CHEST WALL OSCILL: CPT

## 2023-06-17 PROCEDURE — 2700000000 HC OXYGEN THERAPY PER DAY

## 2023-06-17 PROCEDURE — 99239 HOSP IP/OBS DSCHRG MGMT >30: CPT | Performed by: OPHTHALMOLOGY

## 2023-06-17 PROCEDURE — 6370000000 HC RX 637 (ALT 250 FOR IP)

## 2023-06-17 PROCEDURE — 94640 AIRWAY INHALATION TREATMENT: CPT

## 2023-06-17 PROCEDURE — 82948 REAGENT STRIP/BLOOD GLUCOSE: CPT

## 2023-06-17 PROCEDURE — 2580000003 HC RX 258

## 2023-06-17 RX ORDER — IPRATROPIUM BROMIDE AND ALBUTEROL SULFATE 2.5; .5 MG/3ML; MG/3ML
1 SOLUTION RESPIRATORY (INHALATION) 3 TIMES DAILY
Status: DISCONTINUED | OUTPATIENT
Start: 2023-06-17 | End: 2023-06-17

## 2023-06-17 RX ORDER — IPRATROPIUM BROMIDE AND ALBUTEROL SULFATE 2.5; .5 MG/3ML; MG/3ML
1 SOLUTION RESPIRATORY (INHALATION) EVERY 4 HOURS PRN
Status: DISCONTINUED | OUTPATIENT
Start: 2023-06-17 | End: 2023-06-17 | Stop reason: HOSPADM

## 2023-06-17 RX ADMIN — Medication 0.5 TABLET: at 08:30

## 2023-06-17 RX ADMIN — HYDROCHLOROTHIAZIDE 12.5 MG: 12.5 CAPSULE ORAL at 08:30

## 2023-06-17 RX ADMIN — SODIUM CHLORIDE, PRESERVATIVE FREE 10 ML: 5 INJECTION INTRAVENOUS at 08:30

## 2023-06-17 RX ADMIN — ASPIRIN 81 MG: 81 TABLET, COATED ORAL at 08:29

## 2023-06-17 RX ADMIN — FOLIC ACID 1 MG: 1 TABLET ORAL at 08:30

## 2023-06-17 RX ADMIN — PANTOPRAZOLE SODIUM 40 MG: 40 TABLET, DELAYED RELEASE ORAL at 05:06

## 2023-06-17 RX ADMIN — IPRATROPIUM BROMIDE AND ALBUTEROL SULFATE 1 DOSE: .5; 3 SOLUTION RESPIRATORY (INHALATION) at 09:21

## 2023-06-17 NOTE — DISCHARGE SUMMARY
Hospitalist Discharge Summary    Patient:  Jose Carlos Randolph  YOB: 1935    MRN: [id number]   Acct: [de-identified]    Primary Care Physician: Gabriela Damon MD    Admit date:  6/15/2023    Discharge date:  6/17/2023    Discharge Diagnoses:  Principal Problem:    Acute on chronic respiratory failure with hypoxia [facility])  Resolved Problems:    * No resolved hospital problems. *      Discharge Medications:         Medication List        CHANGE how you take these medications      OXYGEN  2 lpm at rest and to sleep and 4 lpm to ambulate  What changed: additional instructions            CONTINUE taking these medications      ALIGN PO     aspirin 81 MG EC tablet     atorvastatin 40 MG tablet  Commonly known as: LIPITOR  TAKE 1 TABLET EVERY EVENING     Calcium Citrate-Vitamin D 200-200 MG-UNIT Tabs     CoQ10 100 MG Caps     etanercept 50 MG/ML injection  Commonly known as: ENBREL     folic acid 1 MG tablet  Commonly known as: FOLVITE     ipratropium 0.5 mg-albuterol 2.5 mg 0.5-2.5 (3) MG/3ML Soln nebulizer solution  Commonly known as: DUONEB  Inhale 3 mLs into the lungs 3 times daily USE 1 VIAL IN NEBULIZER EVERY 6 HOURS - for shortness of breath/wheezing     pantoprazole 40 MG tablet  Commonly known as: PROTONIX  TAKE 1 TABLET DAILY     Prolia 60 MG/ML Sosy SC injection  Generic drug: denosumab  Inject 1 mL into the skin every 6 months     therapeutic multivitamin-minerals tablet            STOP taking these medications      hydroCHLOROthiazide 12.5 MG capsule  Commonly known as: MICROZIDE     losartan 50 MG tablet  Commonly known as: COZAAR     metoprolol succinate 50 MG extended release tablet  Commonly known as: TOPROL XL            ASK your doctor about these medications      FISH OIL PO              Diet:  ADULT DIET;  Regular; Low Fat/Low Chol/High Fiber/CAYETANO; Low Sodium (2 gm); 2000 ml    Activity:  Activity as tolerated (Patient may move about with assist as indicated or with supervision.)    Follow-up:  in

## 2023-06-17 NOTE — RT PROTOCOL NOTE
RT Inhaler-Nebulizer Bronchodilator Protocol Note    There is a bronchodilator order in the chart from a provider indicating to follow the RT Bronchodilator Protocol and there is an Initiate RT Inhaler-Nebulizer Bronchodilator Protocol order as well (see protocol at bottom of note). CXR Findings:  No results found. The findings from the last RT Protocol Assessment were as follows:   History Pulmonary Disease: None or smoker <15 pack years  Respiratory Pattern: Regular pattern and RR 12-20 bpm  Breath Sounds: Slightly diminished and/or crackles  Cough: Strong, spontaneous, non-productive  Indication for Bronchodilator Therapy: On home bronchodilators (3x a day)  Bronchodilator Assessment Score: 2    Aerosolized bronchodilator medication orders have been revised according to the RT Inhaler-Nebulizer Bronchodilator Protocol below. Respiratory Therapist to perform RT Therapy Protocol Assessment initially then follow the protocol. Repeat RT Therapy Protocol Assessment PRN for score 0-3 or on second treatment, BID, and PRN for scores above 3. No Indications - adjust the frequency to every 6 hours PRN wheezing or bronchospasm, if no treatments needed after 48 hours then discontinue using Per Protocol order mode. If indication present, adjust the RT bronchodilator orders based on the Bronchodilator Assessment Score as indicated below. Use Inhaler orders unless patient has one or more of the following: on home nebulizer, not able to hold breath for 10 seconds, is not alert and oriented, cannot activate and use MDI correctly, or respiratory rate 25 breaths per minute or more, then use the equivalent nebulizer order(s) with same Frequency and PRN reasons based on the score. If a patient is on this medication at home then do not decrease Frequency below that used at home.     0-3 - enter or revise RT bronchodilator order(s) to equivalent RT Bronchodilator order with Frequency of every 4 hours PRN for

## 2023-06-17 NOTE — NURSE NAVIGATOR
Discharge education complete. Patient/daughter educated on new medications, follow up care and reasons to seek medical attention. Patient/daughter denies questions or concerns, no sxs of distress noted , wheel chair escort to vehicle provided.

## 2023-06-17 NOTE — PLAN OF CARE
Problem: Respiratory - Adult  Goal: Achieves optimal ventilation and oxygenation  6/17/2023 0926 by Caroline Donahue RCP  Outcome: Progressing  6/16/2023 2204 by Lynn Perdomo RN  Outcome: Progressing

## 2023-06-17 NOTE — PLAN OF CARE
Problem: Discharge Planning  Goal: Discharge to home or other facility with appropriate resources  Outcome: Completed  Flowsheets (Taken 6/17/2023 0815)  Discharge to home or other facility with appropriate resources: Identify barriers to discharge with patient and caregiver     Problem: Safety - Adult  Goal: Free from fall injury  Outcome: Completed     Problem: ABCDS Injury Assessment  Goal: Absence of physical injury  Outcome: Completed     Problem: Respiratory - Adult  Goal: Achieves optimal ventilation and oxygenation  6/17/2023 1229 by Ila Siegel RN  Outcome: Completed  6/17/2023 0926 by Pamela Vasquez RCP  Outcome: Progressing     Problem: Metabolic/Fluid and Electrolytes - Adult  Goal: Hemodynamic stability and optimal renal function maintained  Outcome: Completed  Goal: Glucose maintained within prescribed range  Outcome: Completed     Problem: Musculoskeletal - Adult  Goal: Return mobility to safest level of function  Outcome: Completed  Flowsheets (Taken 6/17/2023 0815)  Return Mobility to Safest Level of Function: Assess patient stability and activity tolerance for standing, transferring and ambulating with or without assistive devices  Goal: Maintain proper alignment of affected body part  Outcome: Completed  Goal: Return ADL status to a safe level of function  Outcome: Completed     Problem: Chronic Conditions and Co-morbidities  Goal: Patient's chronic conditions and co-morbidity symptoms are monitored and maintained or improved  Outcome: Completed

## 2023-06-17 NOTE — PROGRESS NOTES
Echo completed at bedside.
Hospitalist History & Physical    Patient:  Isabel Montaño    Unit/Bed:8B-24/024-A  YOB: 1935  MRN: [id number]   Acct: [de-identified]   PCP: Radha Guardado MD  Code Status: Full Code    Date of Service: Pt seen/examined on 06/16/23 and admitted to Inpatient with expected LOS greater than two midnights due to medical therapy. Chief Complaint: shortness of breath    Assessment/Plan:    Sinusitis: Symptoms improving. Patient went to urgent care. Found to have abnormal EKG. Was transferred to [facility] for cardiology evaluation. In ED CTA chest shows no PE, extensive interstitial fibrosis with superimposed interstitial pneumonia/edema bilaterally. Small bilateral effusions also seen. COVID flu negative. No WBC elevation. No infectious symptoms. Patient is s/p 1 dose of 0.5 mg of IV Bumex. No concern for pulmonary edema. Nonspecific T wave changes. No chest pain. If echo comes back normal then will discharge home. If abnormal will consider stress test.    Chronic hypoxic respiratory failure: At baseline oxygen of 3 L nasal cannula with use of 4 L nasal cannula with activity. 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 Continue home medications and encourage pulmonary hygiene. NIDDMII: Sliding scale insulin. Hypoglycemia protocol. Accu checks. Carb controlled diet. Essential hypertension: Continue home medications and monitor    HLD: Statin    GERD: PPI      History of Present Illness:  Isabel Montaño is a 80 y.o. female with PMHx of pulmonary fibrosis, rheumatoid arthritis, DM, HTN, HLD, GERD and chronic hypoxic respiratory failure who presented to [facility] with chief complaint of nasal congestion. Patient reports that she initially went to urgent care for concerns for sinus infection. States that she has had congestion and sinus pressure for several days along with the change in color of her mucus prompted her to be evaluated.   States while there, she
Hospitalist History & Physical    Patient:  Jay Reddy    Unit/Bed:8B-24/024-A  YOB: 1935  MRN: [id number]   Acct: [de-identified]   PCP: Travon Vega MD  Code Status: Full Code    Date of Service: Pt seen/examined on 06/17/23 and admitted to Inpatient with expected LOS greater than two midnights due to medical therapy. Chief Complaint: shortness of breath    Assessment/Plan:    Sinusitis: Symptoms improving. Patient went to urgent care. Found to have abnormal EKG. Was transferred to [facility] for cardiology evaluation. In ED CTA chest shows no PE, extensive interstitial fibrosis with superimposed interstitial pneumonia/edema bilaterally. Small bilateral effusions also seen. COVID flu negative. No WBC elevation. No infectious symptoms. Patient is s/p 1 dose of 0.5 mg of IV Bumex. No concern for pulmonary edema. Nonspecific T wave changes. No chest pain. No sig issues with echo  Chronic hypoxic respiratory failure: At baseline oxygen of 3 L nasal cannula with use of 4 L nasal cannula with activity. 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 Continue home medications and encourage pulmonary hygiene. NIDDMII: Sliding scale insulin. Hypoglycemia protocol. Accu checks. Carb controlled diet. Essential hypertension: only take if BP is above 140: will see Dr. Phuc Andres post discharge    HLD: Statin    GERD: PPI      History of Present Illness:  Jay Reddy is a 80 y.o. female with PMHx of pulmonary fibrosis, rheumatoid arthritis, DM, HTN, HLD, GERD and chronic hypoxic respiratory failure who presented to [facility] with chief complaint of nasal congestion. Patient reports that she initially went to urgent care for concerns for sinus infection. States that she has had congestion and sinus pressure for several days along with the change in color of her mucus prompted her to be evaluated.   States while there, she received an EKG, and due to the rhythm they
Patient released in stable condition. Instructed to go directly to [facility] emergency department for further evaluation and treatment. Patient verbalized understanding.
extremities. Seated marches, Seated heel/toe raises, and Long arc quads. Exercises were completed for increased independence with functional mobility. Functional Outcome Measures: Completed  AM-PAC Inpatient Mobility without Stair Climbing Raw Score : 15  AM-PAC Inpatient without Stair Climbing T-Scale Score : 43.03    ASSESSMENT:  Activity Tolerance:  Patient tolerance of  treatment: good. Treatment Initiated: Treatment and education initiated within context of evaluation. Evaluation time included review of current medical information, gathering information related to past medical, social and functional history, completion of standardized testing, formal and informal observation of tasks, assessment of data and development of plan of care and goals. Treatment time included skilled education and facilitation of tasks to increase safety and independence with functional mobility for improved independence and quality of life. Assessment: Body Structures, Functions, Activity Limitations Requiring Skilled Therapeutic Intervention: Decreased functional mobility , Decreased strength, Decreased endurance, Decreased balance, Decreased posture  Assessment: Capo Pop is a 80 y.o. female that presents with Acute pulmonary edema. she is ind prior to admission now requiring assist for basic mobility. Pt demonstrates a decrease in baseline by way of bed mobility, transfers and ambulation secondary to decreased activity tolerance, strength, fatigue, and balance deficits. Pt will benefit from skilled PT services throughout admission and beyond hospital discharge for improvements in functional mobility and in order to decrease fall risk and return pt to OF. Therapy Prognosis: Good    Requires PT Follow-Up: Yes    Discharge Recommendations:  Discharge Recommendations: Continue to assess pending progress, Home with assist PRN    Patient Education:      .     Patient Education  Education Given To:

## 2023-06-19 ENCOUNTER — CARE COORDINATION (OUTPATIENT)
Dept: CASE MANAGEMENT | Age: 88
End: 2023-06-19

## 2023-06-19 DIAGNOSIS — J81.0 ACUTE PULMONARY EDEMA (HCC): Primary | ICD-10-CM

## 2023-06-19 PROCEDURE — 1111F DSCHRG MED/CURRENT MED MERGE: CPT | Performed by: PHYSICAL MEDICINE & REHABILITATION

## 2023-06-19 NOTE — CARE COORDINATION
[facility] Care Transitions Initial Follow Up Call    Call within 2 business days of discharge: Yes    Patient Current Location:  Home: [address]  [address] [facility]    Care Transition Nurse contacted the patient by telephone to perform post hospital discharge assessment. Verified name and  with patient as identifiers. Provided introduction to self, and explanation of the Care Transition Nurse role. Patient: Sonny Jackson Patient : 1935   MRN: [id number]  Reason for Admission: Acute pulmonary edema  Discharge Date: 23 RARS: Readmission Risk Score: 9.7      Last Discharge [address]       Date Complaint Diagnosis Description Type Department Provider    6/15/23 Head Congestion; Dizziness Acute pulmonary edema ([facility] [address] . .. ED to Hosp-Admission (Discharged) (ADMITTED) YEIMI Childers MD; [facility]. .. Challenges to be reviewed by the provider   Additional needs identified to be addressed with provider: No  none               Method of communication with provider: none. Spoke with Ashanti Hernandes, said she is feeling a lot better. Denies fever, cough, dizziness, SOB/PEREZ. Still has a little bit of sinus issues but getting better. Reviewed medications/changes. Using O2 @ 3L during day and sleep, increases to 4L with ambulation. Appetite and fluid intake is good. Will see PCP next week, declined earlier day. Denies any other needs. No other questions or concerns at this time. Will continue to follow. Care Transition Nurse reviewed discharge instructions, medical action plan, and red flags with patient who verbalized understanding. The patient was given an opportunity to ask questions and does not have any further questions or concerns at this time. Were discharge instructions available to patient? Yes. Reviewed appropriate site of care based on symptoms and resources available to patient including: PCP  Specialist  Urgent care clinics  When to call 911.  The patient agrees to

## 2023-06-20 LAB
BACTERIA BLD AEROBE CULT: NORMAL
BACTERIA BLD AEROBE CULT: NORMAL
EKG ATRIAL RATE: 64 BPM
EKG ATRIAL RATE: 70 BPM
EKG ATRIAL RATE: 71 BPM
EKG ATRIAL RATE: 85 BPM
EKG P AXIS: 17 DEGREES
EKG P AXIS: 23 DEGREES
EKG P AXIS: 42 DEGREES
EKG P AXIS: 8 DEGREES
EKG P-R INTERVAL: 158 MS
EKG P-R INTERVAL: 174 MS
EKG P-R INTERVAL: 196 MS
EKG P-R INTERVAL: 206 MS
EKG Q-T INTERVAL: 418 MS
EKG Q-T INTERVAL: 444 MS
EKG Q-T INTERVAL: 450 MS
EKG Q-T INTERVAL: 484 MS
EKG QRS DURATION: 150 MS
EKG QRS DURATION: 152 MS
EKG QRS DURATION: 154 MS
EKG QRS DURATION: 168 MS
EKG QTC CALCULATION (BAZETT): 479 MS
EKG QTC CALCULATION (BAZETT): 489 MS
EKG QTC CALCULATION (BAZETT): 497 MS
EKG QTC CALCULATION (BAZETT): 499 MS
EKG R AXIS: -42 DEGREES
EKG R AXIS: -43 DEGREES
EKG R AXIS: -45 DEGREES
EKG R AXIS: -47 DEGREES
EKG T AXIS: 13 DEGREES
EKG T AXIS: 34 DEGREES
EKG T AXIS: 52 DEGREES
EKG T AXIS: 60 DEGREES
EKG VENTRICULAR RATE: 64 BPM
EKG VENTRICULAR RATE: 70 BPM
EKG VENTRICULAR RATE: 71 BPM
EKG VENTRICULAR RATE: 85 BPM

## 2023-06-22 ENCOUNTER — OFFICE VISIT (OUTPATIENT)
Dept: PULMONOLOGY | Age: 88
End: 2023-06-22
Payer: MEDICARE

## 2023-06-22 VITALS
BODY MASS INDEX: 29.49 KG/M2 | DIASTOLIC BLOOD PRESSURE: 60 MMHG | HEIGHT: 61 IN | SYSTOLIC BLOOD PRESSURE: 118 MMHG | TEMPERATURE: 97.8 F | HEART RATE: 105 BPM | OXYGEN SATURATION: 90 % | WEIGHT: 156.2 LBS

## 2023-06-22 DIAGNOSIS — J96.11 CHRONIC RESPIRATORY FAILURE WITH HYPOXIA (HCC): ICD-10-CM

## 2023-06-22 DIAGNOSIS — J84.10 PULMONARY FIBROSIS (HCC): ICD-10-CM

## 2023-06-22 DIAGNOSIS — J96.11 CHRONIC RESPIRATORY FAILURE WITH HYPOXIA (HCC): Primary | ICD-10-CM

## 2023-06-22 DIAGNOSIS — J47.9 BRONCHIECTASIS WITHOUT COMPLICATION (HCC): ICD-10-CM

## 2023-06-22 PROCEDURE — 94618 PULMONARY STRESS TESTING: CPT | Performed by: NURSE PRACTITIONER

## 2023-06-22 PROCEDURE — 99214 OFFICE O/P EST MOD 30 MIN: CPT | Performed by: NURSE PRACTITIONER

## 2023-06-22 PROCEDURE — 1123F ACP DISCUSS/DSCN MKR DOCD: CPT | Performed by: NURSE PRACTITIONER

## 2023-06-22 ASSESSMENT — ENCOUNTER SYMPTOMS
EYES NEGATIVE: 1
WHEEZING: 0
NAUSEA: 0
VOMITING: 0
ABDOMINAL PAIN: 0
COUGH: 1
RHINORRHEA: 1
DIARRHEA: 0
CHEST TIGHTNESS: 0
SHORTNESS OF BREATH: 1

## 2023-06-22 NOTE — PROGRESS NOTES
Extensive interstitial fibrosis with superimposed interstitial pneumonia/edema bilaterally. Cannot exclude heart failure. 2. Small bilateral effusions. Prior gastric surgery. Prior cholecystectomy. **This report has been created using voice recognition software. It may contain minor errors which are inherent in voice recognition technology. **          Six Minute Walk Test  Brittaney Gonzalez 1935    Six minute walk test done in my office today by my medical assistant. Mari's oxygen saturation at rest on room air was 94%. Her oxygen saturation dropped to 85% on room air with exertion after walking 320 feet and within 2 minutes. The six minute walk test was repeated with oxygen supplementation. Oxygen supplementation was started with 1 LPM via nasal cannula and titrated to 2 LPM via nasal cannula. At the end of the test Mari Felders oxygen saturation remained at 93% on 2 LPM with exertion. She is mobile in the home and requires oxygen as outlined above. Patient ambulated a total of 540 feet with oxygen. Resting Dyspnea/Benson score was 0 / 10  and 1 / 10  upon completion of the walk. Resting heart rate was  94 bpm and 78 bpm upon completing the walk. Nasal Oxygen order:  2 lpm to be used with:  Rest: No.  Walking: Yes. Sleep: Yes. POC flow: Yes. Continuous flow: Yes. DME Medical Necessity Documentation    Ellen Fregoso was seen in the office on 6/22/2023 for the diagnosis Pulmonary fibrosis. I am prescribing oxygen because the diagnosis and testing requires the patient to have oxygen in the home. her condition will improve or be benefited by oxygen use. The patient is able to perform good mobility in a home setting and therefore does require the use of a portable oxygen system. Assessment       ICD-10-CM    1. Chronic respiratory failure with hypoxia (HCC)  J96.11 6 Minute Walk Test      2. Bronchiectasis without complication ([address]  A20.2       3.  Pulmonary fibrosis ([address]

## 2023-06-25 ENCOUNTER — HOSPITAL ENCOUNTER (EMERGENCY)
Age: 88
Discharge: HOME | End: 2023-06-25
Attending: EMERGENCY MEDICINE
Payer: MEDICARE

## 2023-06-25 ENCOUNTER — APPOINTMENT (OUTPATIENT)
Dept: GENERAL RADIOLOGY | Age: 88
End: 2023-06-25
Payer: MEDICARE

## 2023-06-25 VITALS
BODY MASS INDEX: 30.58 KG/M2 | TEMPERATURE: 98 F | HEIGHT: 61 IN | OXYGEN SATURATION: 92 % | HEART RATE: 76 BPM | WEIGHT: 162 LBS | RESPIRATION RATE: 18 BRPM | DIASTOLIC BLOOD PRESSURE: 74 MMHG | SYSTOLIC BLOOD PRESSURE: 155 MMHG

## 2023-06-25 DIAGNOSIS — R79.89 ELEVATED BRAIN NATRIURETIC PEPTIDE (BNP) LEVEL: ICD-10-CM

## 2023-06-25 DIAGNOSIS — R00.2 PALPITATIONS: Primary | ICD-10-CM

## 2023-06-25 LAB
ANION GAP SERPL CALC-SCNC: 11 MEQ/L (ref 8–16)
BASOPHILS ABSOLUTE: 0.1 THOU/MM3 (ref 0–0.1)
BASOPHILS NFR BLD AUTO: 1.1 %
BUN SERPL-MCNC: 18 MG/DL (ref 7–22)
CALCIUM SERPL-MCNC: 9.4 MG/DL (ref 8.5–10.5)
CHLORIDE SERPL-SCNC: 100 MEQ/L (ref 98–111)
CO2 SERPL-SCNC: 28 MEQ/L (ref 23–33)
CREAT SERPL-MCNC: 0.7 MG/DL (ref 0.4–1.2)
DEPRECATED RDW RBC AUTO: 48.4 FL (ref 35–45)
EKG ATRIAL RATE: 79 BPM
EKG ATRIAL RATE: 82 BPM
EKG P AXIS: 24 DEGREES
EKG P AXIS: 82 DEGREES
EKG P-R INTERVAL: 172 MS
EKG P-R INTERVAL: 180 MS
EKG Q-T INTERVAL: 424 MS
EKG Q-T INTERVAL: 428 MS
EKG QRS DURATION: 122 MS
EKG QRS DURATION: 136 MS
EKG QTC CALCULATION (BAZETT): 486 MS
EKG QTC CALCULATION (BAZETT): 500 MS
EKG R AXIS: -43 DEGREES
EKG R AXIS: -46 DEGREES
EKG T AXIS: -33 DEGREES
EKG T AXIS: 30 DEGREES
EKG VENTRICULAR RATE: 79 BPM
EKG VENTRICULAR RATE: 82 BPM
EOSINOPHIL NFR BLD AUTO: 4.1 %
EOSINOPHILS ABSOLUTE: 0.2 THOU/MM3 (ref 0–0.4)
ERYTHROCYTE [DISTWIDTH] IN BLOOD BY AUTOMATED COUNT: 13.2 % (ref 11.5–14.5)
GFR SERPL CREATININE-BSD FRML MDRD: > 60 ML/MIN/1.73M2
GLUCOSE SERPL-MCNC: 139 MG/DL (ref 70–108)
HCT VFR BLD AUTO: 36.9 % (ref 37–47)
HGB BLD-MCNC: 11.6 GM/DL (ref 12–16)
IMM GRANULOCYTES # BLD AUTO: 0.03 THOU/MM3 (ref 0–0.07)
IMM GRANULOCYTES NFR BLD AUTO: 0.5 %
LYMPHOCYTES ABSOLUTE: 1.1 THOU/MM3 (ref 1–4.8)
LYMPHOCYTES NFR BLD AUTO: 19 %
MAGNESIUM SERPL-MCNC: 2.4 MG/DL (ref 1.6–2.4)
MCH RBC QN AUTO: 31.1 PG (ref 26–33)
MCHC RBC AUTO-ENTMCNC: 31.4 GM/DL (ref 32.2–35.5)
MCV RBC AUTO: 98.9 FL (ref 81–99)
MONOCYTES ABSOLUTE: 0.4 THOU/MM3 (ref 0.4–1.3)
MONOCYTES NFR BLD AUTO: 8 %
NEUTROPHILS NFR BLD AUTO: 67.3 %
NRBC BLD AUTO-RTO: 0 /100 WBC
NT-PROBNP SERPL IA-MCNC: 2261 PG/ML (ref 0–449)
OSMOLALITY SERPL CALC.SUM OF ELEC: 281.7 MOSMOL/KG (ref 275–300)
PLATELET # BLD AUTO: 125 THOU/MM3 (ref 130–400)
PMV BLD AUTO: 9.3 FL (ref 9.4–12.4)
POTASSIUM SERPL-SCNC: 4.3 MEQ/L (ref 3.5–5.2)
RBC # BLD AUTO: 3.73 MILL/MM3 (ref 4.2–5.4)
SEGMENTED NEUTROPHILS ABSOLUTE COUNT: 3.8 THOU/MM3 (ref 1.8–7.7)
SODIUM SERPL-SCNC: 139 MEQ/L (ref 135–145)
TROPONIN T: < 0.01 NG/ML
TSH SERPL DL<=0.005 MIU/L-ACNC: 2.15 UIU/ML (ref 0.4–4.2)
WBC # BLD AUTO: 5.6 THOU/MM3 (ref 4.8–10.8)

## 2023-06-25 PROCEDURE — 85025 COMPLETE CBC W/AUTO DIFF WBC: CPT

## 2023-06-25 PROCEDURE — 84484 ASSAY OF TROPONIN QUANT: CPT

## 2023-06-25 PROCEDURE — 93010 ELECTROCARDIOGRAM REPORT: CPT | Performed by: INTERNAL MEDICINE

## 2023-06-25 PROCEDURE — 6360000002 HC RX W HCPCS: Performed by: STUDENT IN AN ORGANIZED HEALTH CARE EDUCATION/TRAINING PROGRAM

## 2023-06-25 PROCEDURE — 96365 THER/PROPH/DIAG IV INF INIT: CPT

## 2023-06-25 PROCEDURE — 99285 EMERGENCY DEPT VISIT HI MDM: CPT

## 2023-06-25 PROCEDURE — 93226 XTRNL ECG REC<48 HR SCAN A/R: CPT

## 2023-06-25 PROCEDURE — 96375 TX/PRO/DX INJ NEW DRUG ADDON: CPT

## 2023-06-25 PROCEDURE — 71045 X-RAY EXAM CHEST 1 VIEW: CPT

## 2023-06-25 PROCEDURE — 93005 ELECTROCARDIOGRAM TRACING: CPT | Performed by: STUDENT IN AN ORGANIZED HEALTH CARE EDUCATION/TRAINING PROGRAM

## 2023-06-25 PROCEDURE — 93005 ELECTROCARDIOGRAM TRACING: CPT | Performed by: EMERGENCY MEDICINE

## 2023-06-25 PROCEDURE — 93225 XTRNL ECG REC<48 HRS REC: CPT

## 2023-06-25 PROCEDURE — 83735 ASSAY OF MAGNESIUM: CPT

## 2023-06-25 PROCEDURE — 36415 COLL VENOUS BLD VENIPUNCTURE: CPT

## 2023-06-25 PROCEDURE — 84443 ASSAY THYROID STIM HORMONE: CPT

## 2023-06-25 PROCEDURE — 83880 ASSAY OF NATRIURETIC PEPTIDE: CPT

## 2023-06-25 PROCEDURE — 80048 BASIC METABOLIC PNL TOTAL CA: CPT

## 2023-06-25 RX ORDER — MAGNESIUM SULFATE IN WATER 40 MG/ML
2000 INJECTION, SOLUTION INTRAVENOUS ONCE
Status: COMPLETED | OUTPATIENT
Start: 2023-06-25 | End: 2023-06-25

## 2023-06-25 RX ORDER — FUROSEMIDE 10 MG/ML
60 INJECTION INTRAMUSCULAR; INTRAVENOUS ONCE
Status: COMPLETED | OUTPATIENT
Start: 2023-06-25 | End: 2023-06-25

## 2023-06-25 RX ADMIN — FUROSEMIDE 60 MG: 10 INJECTION, SOLUTION INTRAMUSCULAR; INTRAVENOUS at 16:26

## 2023-06-25 RX ADMIN — MAGNESIUM SULFATE HEPTAHYDRATE 2000 MG: 40 INJECTION, SOLUTION INTRAVENOUS at 16:33

## 2023-06-25 ASSESSMENT — PAIN - FUNCTIONAL ASSESSMENT
PAIN_FUNCTIONAL_ASSESSMENT: NONE - DENIES PAIN

## 2023-06-26 ENCOUNTER — CARE COORDINATION (OUTPATIENT)
Dept: CASE MANAGEMENT | Age: 88
End: 2023-06-26

## 2023-06-30 LAB
ACQUISITION DURATION: NORMAL S
AVERAGE HEART RATE: 81 BPM
HOOKUP DATE: NORMAL
HOOKUP TIME: NORMAL
MAX HEART RATE TIME/DATE: NORMAL
MAX HEART RATE: 117 BPM
MIN HEART RATE TIME/DATE: NORMAL
MIN HEART RATE: 59 BPM
NUMBER OF QRS COMPLEXES: NORMAL
NUMBER OF SUPRAVENTRICULAR COUPLETS: 0
NUMBER OF SUPRAVENTRICULAR ECTOPICS: 2812
NUMBER OF SUPRAVENTRICULAR ISOLATED BEATS: 2810
NUMBER OF VENTRICULAR BIGEMINAL CYCLES: 0
NUMBER OF VENTRICULAR COUPLETS: 0
NUMBER OF VENTRICULAR ECTOPICS: 3

## 2023-07-05 ENCOUNTER — OFFICE VISIT (OUTPATIENT)
Dept: CARDIOLOGY CLINIC | Age: 88
End: 2023-07-05
Payer: MEDICARE

## 2023-07-05 VITALS
WEIGHT: 160 LBS | HEIGHT: 61 IN | HEART RATE: 84 BPM | BODY MASS INDEX: 30.21 KG/M2 | SYSTOLIC BLOOD PRESSURE: 128 MMHG | DIASTOLIC BLOOD PRESSURE: 80 MMHG

## 2023-07-05 DIAGNOSIS — I49.1 PAC (PREMATURE ATRIAL CONTRACTION): ICD-10-CM

## 2023-07-05 DIAGNOSIS — R00.2 PALPITATION: Primary | ICD-10-CM

## 2023-07-05 PROCEDURE — 99214 OFFICE O/P EST MOD 30 MIN: CPT | Performed by: NUCLEAR MEDICINE

## 2023-07-05 PROCEDURE — 1123F ACP DISCUSS/DSCN MKR DOCD: CPT | Performed by: NUCLEAR MEDICINE

## 2023-07-05 RX ORDER — METOPROLOL SUCCINATE 25 MG/1
25 TABLET, EXTENDED RELEASE ORAL DAILY
Qty: 30 TABLET | Refills: 0 | Status: SHIPPED | OUTPATIENT
Start: 2023-07-05

## 2023-07-05 RX ORDER — METOPROLOL SUCCINATE 25 MG/1
25 TABLET, EXTENDED RELEASE ORAL DAILY
Qty: 90 TABLET | Refills: 3 | Status: SHIPPED | OUTPATIENT
Start: 2023-07-05 | End: 2023-07-05 | Stop reason: SDUPTHER

## 2023-07-05 NOTE — PROGRESS NOTES
patient. Please don't hesitate to contact me regarding any further issues related to the patient care    Orders Placed:  No orders of the defined types were placed in this encounter. Prescribed:  No orders of the defined types were placed in this encounter. Discussed use, benefit, and side effects of prescribed medications. All patient questions answered. Pt voicedunderstanding. Instructed to continue current medications, diet and exercise. Continue risk factor modification and medical management. Patient agreed with treatment plan. Follow up as directed.     Electronically signedby Ricky Fuentes MD on 7/5/2023 at 10:04 AM

## 2023-07-10 ENCOUNTER — CARE COORDINATION (OUTPATIENT)
Dept: CASE MANAGEMENT | Age: 88
End: 2023-07-10

## 2023-07-10 NOTE — CARE COORDINATION
[address] [facility],[address] Floor Transitions Follow Up Call    7/10/2023    Patient: Luz Marina Sandhu  Patient : 1935   MRN: <[id number]>  Reason for Admission: Acute pulmonary edema  Discharge Date: 23 RARS: Readmission Risk Score: 9.7         Spoke with: stacy    [facility] attempted outreach for care transition follow up call. Left HIPPA compliant message and contact information for call back. Care Transitions Subsequent and Final Call    Subsequent and Final Calls  Care Transitions Interventions     Transportation Support: Declined   Other Interventions:              Follow Up  Future Appointments   Date Time Provider Department Center   2023 11:00 AM PRISCILLA Grigsby - CNP Fam Medicine [facility]   2023  2:30 PM Bree Trujillo MD [facility]   10/10/2023  3:00 PM STR EXAM ROOM 10 [facility] OP NURS [place] [facility]   2023  1:00 PM Christiano Wall MD N SRPX Heart [facility]   2024  2:00 PM Janet Hare APRN - [facility]       Ying Sarmiento LPN

## 2023-07-11 ENCOUNTER — OFFICE VISIT (OUTPATIENT)
Dept: FAMILY MEDICINE CLINIC | Age: 88
End: 2023-07-11
Payer: MEDICARE

## 2023-07-11 VITALS
WEIGHT: 152 LBS | BODY MASS INDEX: 28.7 KG/M2 | HEART RATE: 84 BPM | DIASTOLIC BLOOD PRESSURE: 66 MMHG | RESPIRATION RATE: 20 BRPM | TEMPERATURE: 98.2 F | HEIGHT: 61 IN | SYSTOLIC BLOOD PRESSURE: 118 MMHG

## 2023-07-11 DIAGNOSIS — D64.9 ANEMIA, UNSPECIFIED TYPE: ICD-10-CM

## 2023-07-11 DIAGNOSIS — N39.0 CHRONIC UTI: ICD-10-CM

## 2023-07-11 DIAGNOSIS — I50.22 CHRONIC SYSTOLIC (CONGESTIVE) HEART FAILURE (HCC): ICD-10-CM

## 2023-07-11 DIAGNOSIS — R79.9 ABNORMAL FINDING OF BLOOD CHEMISTRY, UNSPECIFIED: ICD-10-CM

## 2023-07-11 DIAGNOSIS — E74.39 GLUCOSE INTOLERANCE: Primary | ICD-10-CM

## 2023-07-11 PROCEDURE — 1123F ACP DISCUSS/DSCN MKR DOCD: CPT | Performed by: NURSE PRACTITIONER

## 2023-07-11 PROCEDURE — 99214 OFFICE O/P EST MOD 30 MIN: CPT | Performed by: NURSE PRACTITIONER

## 2023-07-11 ASSESSMENT — ENCOUNTER SYMPTOMS
ABDOMINAL PAIN: 0
SORE THROAT: 0
EYE DISCHARGE: 0
NAUSEA: 0
SHORTNESS OF BREATH: 0
CONSTIPATION: 0
COUGH: 0
EYE REDNESS: 0
COLOR CHANGE: 0
ABDOMINAL DISTENTION: 0
DIARRHEA: 0
ANAL BLEEDING: 0
BLOOD IN STOOL: 0
RHINORRHEA: 0

## 2023-07-11 NOTE — PROGRESS NOTES
Melvin Ashraf was seen today for new patient. Diagnoses and all orders for this visit:    Glucose intolerance  -     Hemoglobin A1C; Future    Chronic systolic (congestive) heart failure    Chronic UTI  -     Urinalysis with Reflex to Culture; Standing    Anemia, unspecified type  -     CBC with Auto Differential; Future  -     Iron and TIBC; Future    Abnormal finding of blood chemistry, unspecified  -     Hemoglobin A1C; Future          Plan: Will get some repeat labs  See your specialist as scheduled  Back in 6 months    Return in about 6 months (around 1/11/2024). Orders Placed:  Orders Placed This Encounter   Procedures    Hemoglobin A1C    CBC with Auto Differential    Iron and TIBC    Urinalysis with Reflex to Culture     Medications Prescribed:  No orders of the defined types were placed in this encounter. Future Appointments   Date Time Provider [address]   7/18/2023  2:30 PM Navin Diop MD [facility]   10/10/2023  3:00 PM STR EXAM ROOM 10 [facility] OP NURS [place] [facility]   11/6/2023  1:00 PM Baron Dakota MD N SRPX Heart [facility]   5/13/2024  2:00 PM PRISCILLA Burnham - [facility]      Patient given educational materials - see patient instructions. Discussed use, benefit, and side effects of prescribedmedications. All patient questions answered. Pt voiced understanding. Reviewed health maintenance. Instructed to continue current medications, diet and exercise. Patient agreed with treatment plan. Follow up as directed.     Electronically signed by PRISCILLA Khoury CNP on 7/11/2023 at 11:56 AM

## 2023-07-12 ENCOUNTER — CARE COORDINATION (OUTPATIENT)
Dept: CASE MANAGEMENT | Age: 88
End: 2023-07-12

## 2023-07-12 RX ORDER — LOSARTAN POTASSIUM 50 MG/1
TABLET ORAL
Qty: 90 TABLET | Refills: 1 | OUTPATIENT
Start: 2023-07-12

## 2023-07-12 NOTE — CARE COORDINATION
[facility] Care Transitions Follow Up Call    Patient Current Location:  Home: [id number]    Care Transition Nurse contacted the patient by telephone to follow up after admission. Verified name and  with patient as identifiers. Patient: Luis Isbell  Patient : 1935   MRN: <[id number]>  Reason for Admission: Acute pulmonary edema  Discharge Date: 23 RARS: Readmission Risk Score: 9.7      Needs to be reviewed by the provider   Additional needs identified to be addressed with provider: No  none             Method of communication with provider: none. Pt states she is doing great, likes her new PCP. Anemia may be an issue and she will have blood work  then see PCP for medication possibility. No needs. Addressed changes since last contact:  none  Discussed follow-up appointments. If no appointment was previously scheduled, appointment scheduling offered: No.   Is follow up appointment scheduled within 7 days of discharge? Yes. Follow Up  Future Appointments   Date Time Provider [facility] [address]   10/10/2023  3:00 PM STR EXAM ROOM 10 [facility] OP NURS [place] [facility]   2023  1:00 PM [address] [place], [place] [address]MD [place] SRPX Heart [address]   1/15/2024  2:00 PM PRISCILLA Mota CNP [facility] Medicine [facility]   2024  2:00 PM PRISCILLA Craven Transition Nurse reviewed discharge instructions with patient and discussed any barriers to care and/or understanding of plan of care after discharge. Discussed appropriate site of care based on symptoms and resources available to patient including: PCP  Specialist. The patient agrees to contact the PCP office for questions related to their healthcare.        Patients top risk factors for readmission: falls and medical condition-Acute pulmonary edema  Interventions to address risk factors: Obtained and reviewed discharge summary and/or continuity of care documents    Offered patient enrollment in

## 2023-07-19 ENCOUNTER — CARE COORDINATION (OUTPATIENT)
Dept: CASE MANAGEMENT | Age: 88
End: 2023-07-19

## 2023-07-19 LAB
ABSOLUTE BASO #: 0.08 K/UL (ref 0–0.2)
ABSOLUTE EOS #: 0.32 K/UL (ref 0–0.5)
ABSOLUTE LYMPH #: 1.22 K/UL (ref 1–4)
ABSOLUTE MONO #: 0.55 K/UL (ref 0.2–1)
ABSOLUTE NEUT #: 4.18 K/UL (ref 1.5–7.5)
AVERAGE GLUCOSE: 123 MG/DL
BASOPHILS RELATIVE PERCENT: 1.3 %
EOSINOPHILS RELATIVE PERCENT: 5 %
HBA1C MFR BLD: 5.9 % (ref 4.2–5.6)
HCT VFR BLD CALC: 40.1 % (ref 34–45)
HEMOGLOBIN: 14 G/DL (ref 11.5–15.5)
LYMPHOCYTE %: 19.2 %
MCH RBC QN AUTO: 31.6 PG (ref 25–33)
MCHC RBC AUTO-ENTMCNC: 34.9 G/DL (ref 31–36)
MCV RBC AUTO: 90.5 FL (ref 80–99)
MONOCYTES # BLD: 8.6 %
NEUTROPHILS RELATIVE PERCENT: 65.7 %
PDW BLD-RTO: 13 % (ref 11.5–15)
PLATELETS: 153 K/UL (ref 130–400)
PMV BLD AUTO: 9.4 FL (ref 9.3–13)
RBC: 4.43 M/UL (ref 3.8–5.4)
WBC: 6.4 K/UL (ref 3.5–11)

## 2023-07-19 NOTE — CARE COORDINATION
[facility] Care Transitions Follow Up Call-Final    Patient Current Location:  Home: [phone]    Care Transition Nurse contacted the patient by telephone to follow up after admission on 6/15/23. Verified name and  with patient as identifiers. Patient: Cade Solares  Patient : 1935   MRN: [id number]  Reason for Admission: Acute pulmonary edema  Discharge Date: 23 RARS: Readmission Risk Score: 9.7      Needs to be reviewed by the provider   Additional needs identified to be addressed with provider: No  none             Method of communication with provider: none. Spoke with Waleska Colorado, said she is doing pretty well. Just got some blood work done to evaluate for anemia. Denies chest pain, SOB/PEREZ. Only using O2 at night. Eating, drinking, sleeping good. B&B normal.  Denies any other needs. Final call. No other questions or concerns at this time. Addressed changes since last contact:  none  Discussed follow-up appointments. Follow Up  Future Appointments   Date Time Provider [address] [place] [address]   10/10/2023  3:00 PM STR EXAM ROOM 10 [facility] OP NURS [place] [facility]   2023  1:00 PM 120[address] [place], [place] [address]MD [place] SRPX Heart [address] [facility]   1/15/2024  2:00 PM PRISCILLA Abad CNP Fam Medicine [facility]   2024  2:00 PM PRISCILLA Swenson CNP Pulm Med [facility] follow up appointment(s): stacy    Care Transition Nurse reviewed medical action plan and red flags with patient and discussed any barriers to care and/or understanding of plan of care after discharge. Discussed appropriate site of care based on symptoms and resources available to patient including: PCP  Specialist  Urgent care clinics  When to call 911. The patient agrees to contact the PCP office for questions related to their healthcare. Advance Care Planning:   reviewed and current.      Patients top risk factors for readmission: lack of knowledge about disease, medical condition-CAD, HTN, DM,

## 2023-07-20 LAB
IRON SATURATION: 27 % (ref 20–50)
IRON, SERUM: 80 UG/DL (ref 37–145)
TOTAL IRON BINDING CAPACITY: 294 UG/DL (ref 250–450)
UNSATURATED IRON BINDING CAPACITY: 214 UG/DL (ref 112–347)

## 2023-07-21 LAB
APPEARANCE: CLEAR
BILIRUBIN: NEGATIVE
COLOR: YELLOW
GLUCOSE BLD-MCNC: NEGATIVE MG/DL
KETONES, URINE: NEGATIVE
LEUKOCYTE ESTERASE, URINE: NEGATIVE
NITRITE, URINE: NEGATIVE
OCCULT BLOOD,URINE: NEGATIVE
PH: 6.5 (ref 5–9)
PROTEIN, URINE: NEGATIVE
SP GRAVITY MISCELLANEOUS: 1.02 (ref 1–1.03)
UROBILINOGEN, URINE: NORMAL

## 2023-07-31 RX ORDER — METOPROLOL SUCCINATE 25 MG/1
TABLET, EXTENDED RELEASE ORAL
Qty: 90 TABLET | Refills: 1 | Status: SHIPPED | OUTPATIENT
Start: 2023-07-31

## 2023-08-02 ENCOUNTER — APPOINTMENT (OUTPATIENT)
Dept: GENERAL RADIOLOGY | Age: 88
End: 2023-08-02
Payer: MEDICARE

## 2023-08-02 ENCOUNTER — HOSPITAL ENCOUNTER (EMERGENCY)
Age: 88
Discharge: HOME | End: 2023-08-02
Attending: STUDENT IN AN ORGANIZED HEALTH CARE EDUCATION/TRAINING PROGRAM
Payer: MEDICARE

## 2023-08-02 VITALS
HEIGHT: 61 IN | DIASTOLIC BLOOD PRESSURE: 74 MMHG | BODY MASS INDEX: 30.21 KG/M2 | HEART RATE: 84 BPM | RESPIRATION RATE: 17 BRPM | OXYGEN SATURATION: 97 % | TEMPERATURE: 98.1 F | WEIGHT: 160 LBS | SYSTOLIC BLOOD PRESSURE: 160 MMHG

## 2023-08-02 DIAGNOSIS — R00.2 PALPITATIONS: Primary | ICD-10-CM

## 2023-08-02 LAB
ANION GAP SERPL CALC-SCNC: 8 MEQ/L (ref 8–16)
BACTERIA: NORMAL
BASOPHILS ABSOLUTE: 0.1 THOU/MM3 (ref 0–0.1)
BASOPHILS NFR BLD AUTO: 0.9 %
BILIRUB UR QL STRIP: NEGATIVE
BUN SERPL-MCNC: 16 MG/DL (ref 7–22)
CALCIUM SERPL-MCNC: 9.8 MG/DL (ref 8.5–10.5)
CASTS #/AREA URNS LPF: NORMAL /LPF
CASTS #/AREA URNS LPF: NORMAL /LPF
CHARACTER UR: CLEAR
CHARCOAL URNS QL MICRO: NORMAL
CHLORIDE SERPL-SCNC: 102 MEQ/L (ref 98–111)
CO2 SERPL-SCNC: 30 MEQ/L (ref 23–33)
COLOR UR: YELLOW
CREAT SERPL-MCNC: 0.8 MG/DL (ref 0.4–1.2)
CRYSTALS URNS QL MICRO: NORMAL
DEPRECATED RDW RBC AUTO: 47.8 FL (ref 35–45)
EOSINOPHIL NFR BLD AUTO: 3.5 %
EOSINOPHILS ABSOLUTE: 0.2 THOU/MM3 (ref 0–0.4)
EPITHELIAL CELLS, UA: NORMAL /HPF
ERYTHROCYTE [DISTWIDTH] IN BLOOD BY AUTOMATED COUNT: 13.7 % (ref 11.5–14.5)
FLUAV RNA RESP QL NAA+PROBE: NOT DETECTED
FLUBV RNA RESP QL NAA+PROBE: NOT DETECTED
GFR SERPL CREATININE-BSD FRML MDRD: > 60 ML/MIN/1.73M2
GLUCOSE SERPL-MCNC: 153 MG/DL (ref 70–108)
GLUCOSE UR QL STRIP.AUTO: NEGATIVE MG/DL
HCT VFR BLD AUTO: 40.5 % (ref 37–47)
HGB BLD-MCNC: 13 GM/DL (ref 12–16)
HGB UR QL STRIP.AUTO: NEGATIVE
IMM GRANULOCYTES # BLD AUTO: 0.05 THOU/MM3 (ref 0–0.07)
IMM GRANULOCYTES NFR BLD AUTO: 0.7 %
KETONES UR QL STRIP.AUTO: NEGATIVE
LEUKOCYTE ESTERASE UR QL STRIP.AUTO: NEGATIVE
LYMPHOCYTES ABSOLUTE: 1 THOU/MM3 (ref 1–4.8)
LYMPHOCYTES NFR BLD AUTO: 14.7 %
MAGNESIUM SERPL-MCNC: 2.1 MG/DL (ref 1.6–2.4)
MCH RBC QN AUTO: 30.7 PG (ref 26–33)
MCHC RBC AUTO-ENTMCNC: 32.1 GM/DL (ref 32.2–35.5)
MCV RBC AUTO: 95.5 FL (ref 81–99)
MONOCYTES ABSOLUTE: 0.5 THOU/MM3 (ref 0.4–1.3)
MONOCYTES NFR BLD AUTO: 7.5 %
NEUTROPHILS NFR BLD AUTO: 72.7 %
NITRITE UR QL STRIP.AUTO: NEGATIVE
NRBC BLD AUTO-RTO: 0 /100 WBC
NT-PROBNP SERPL IA-MCNC: 838.2 PG/ML (ref 0–449)
OSMOLALITY SERPL CALC.SUM OF ELEC: 283.6 MOSMOL/KG (ref 275–300)
PH UR STRIP.AUTO: 7.5 [PH] (ref 5–9)
PLATELET # BLD AUTO: 122 THOU/MM3 (ref 130–400)
PMV BLD AUTO: 9.6 FL (ref 9.4–12.4)
POTASSIUM SERPL-SCNC: 4.5 MEQ/L (ref 3.5–5.2)
PROT UR STRIP.AUTO-MCNC: NEGATIVE MG/DL
RBC # BLD AUTO: 4.24 MILL/MM3 (ref 4.2–5.4)
RBC #/AREA URNS HPF: NORMAL /HPF
RENAL EPI CELLS #/AREA URNS HPF: NORMAL /[HPF]
SARS-COV-2 RNA RESP QL NAA+PROBE: NOT DETECTED
SEGMENTED NEUTROPHILS ABSOLUTE COUNT: 4.9 THOU/MM3 (ref 1.8–7.7)
SODIUM SERPL-SCNC: 140 MEQ/L (ref 135–145)
SPECIFIC GRAVITY UA: 1.01 (ref 1–1.03)
TROPONIN, HIGH SENSITIVITY: 23 NG/L (ref 0–12)
TROPONIN, HIGH SENSITIVITY: 24 NG/L (ref 0–12)
TSH SERPL DL<=0.005 MIU/L-ACNC: 1.38 UIU/ML (ref 0.4–4.2)
UROBILINOGEN, URINE: 0.2 EU/DL (ref 0–1)
WBC # BLD AUTO: 6.8 THOU/MM3 (ref 4.8–10.8)
WBC #/AREA URNS HPF: NORMAL /HPF
YEAST LIKE FUNGI URNS QL MICRO: NORMAL

## 2023-08-02 PROCEDURE — 71046 X-RAY EXAM CHEST 2 VIEWS: CPT

## 2023-08-02 PROCEDURE — 36415 COLL VENOUS BLD VENIPUNCTURE: CPT

## 2023-08-02 PROCEDURE — 85025 COMPLETE CBC W/AUTO DIFF WBC: CPT

## 2023-08-02 PROCEDURE — 81001 URINALYSIS AUTO W/SCOPE: CPT

## 2023-08-02 PROCEDURE — 84443 ASSAY THYROID STIM HORMONE: CPT

## 2023-08-02 PROCEDURE — 83735 ASSAY OF MAGNESIUM: CPT

## 2023-08-02 PROCEDURE — 83880 ASSAY OF NATRIURETIC PEPTIDE: CPT

## 2023-08-02 PROCEDURE — 99285 EMERGENCY DEPT VISIT HI MDM: CPT

## 2023-08-02 PROCEDURE — 80048 BASIC METABOLIC PNL TOTAL CA: CPT

## 2023-08-02 PROCEDURE — 84484 ASSAY OF TROPONIN QUANT: CPT

## 2023-08-02 PROCEDURE — 93005 ELECTROCARDIOGRAM TRACING: CPT | Performed by: STUDENT IN AN ORGANIZED HEALTH CARE EDUCATION/TRAINING PROGRAM

## 2023-08-02 PROCEDURE — 87636 SARSCOV2 & INF A&B AMP PRB: CPT

## 2023-08-02 ASSESSMENT — PAIN SCALES - GENERAL: PAINLEVEL_OUTOF10: 0

## 2023-08-02 NOTE — ED PROVIDER NOTES
normal.         Thought Content: Thought content normal.       DIFFERENTIAL DIAGNOSIS:   COVID-19, influenza, pneumonia, dysrhythmia, anxiety  DIAGNOSTIC RESULTS     EKG: All EKG's are interpreted by the Emergency Department Physician who eithersigns or Co-signs this chart in the absence of a cardiologist.    EKG read and interpreted by myself with comparison to June 25, 2023 gives impression of normal sinus rhythm with heart rate of 76; interval 170; ;QTc 477; axis P-12, R--50, T--6. RADIOLOGY: non-plainfilm images(s) such as CT, Ultrasound and MRI are read by the radiologist.  Plain radiographic images are visualized and preliminarily interpreted by the emergency physician unless otherwise stated below. XR CHEST (2 VW)   Final Result   There are diffuse interstitial opacities throughout the lungs bilaterally. This could be related to fibrotic changes although superimposed pulmonary vascular congestion can't be excluded. **This report has been created using voice recognition software. It may contain minor errors which are inherent in voice recognition technology. **      Final report electronically signed by Dr Vale Love on 8/2/2023 3:51 PM          LABS:   Labs Reviewed   CBC WITH AUTO DIFFERENTIAL - Abnormal; Notable for the following components:       Result Value    MCHC 32.1 (*)     RDW-SD 47.8 (*)     Platelets 440 (*)     All other components within normal limits   BASIC METABOLIC PANEL W/ REFLEX TO MG FOR LOW K - Abnormal; Notable for the following components:    Glucose 153 (*)     All other components within normal limits   TROPONIN - Abnormal; Notable for the following components:    Troponin, High Sensitivity 23 (*)     All other components within normal limits   BRAIN NATRIURETIC PEPTIDE - Abnormal; Notable for the following components:    Pro-.2 (*)     All other components within normal limits   TROPONIN - Abnormal; Notable for the following components:    Troponin,

## 2023-08-02 NOTE — ED NOTES
Pt ambulatory to BR w/own walker, steady gait. Will attempt CCUA w/hat.       Rakan Wallace RN  08/02/23 8613

## 2023-08-02 NOTE — ED NOTES
Pt ambulatory back to room- unable to collect UA, pt missed the hat. Will try again later.       Anna Almaraz, RN  08/02/23 8753

## 2023-08-02 NOTE — ED NOTES
Pt placed on 3LNC supplemental O2 after ambulating to BR. Sats noted 88-90%.  Will monitor     Ishmael Gonzales RN  08/02/23 5606

## 2023-08-02 NOTE — CARE COORDINATION
Per request of family spoke with patient, daughter and son in law. Patient limits driving, has house keeper, receives meals from Mom and has life alert. Provided with information on private aids to assist with bathing, ECF and AL listing to tour for when patient is ready to move. Denied concerns or needs.

## 2023-08-02 NOTE — ED TRIAGE NOTES
Pt to rm 07 per EMS reports palpitations at home since Monday. Denies CP/SOB, no recent illnesses or other concerns. States she thinks its just her nerves but doesn't know. On arrival pt appears in no acute distress, VSS- denies CP/SOB at this time.

## 2023-08-02 NOTE — ED NOTES
Pt sats noted 82% after ambulation to BR. Pt placed back on supplemental O2.       Christine Jack RN  08/02/23 8837

## 2023-08-03 ENCOUNTER — CARE COORDINATION (OUTPATIENT)
Dept: CARE COORDINATION | Age: 88
End: 2023-08-03

## 2023-08-03 DIAGNOSIS — I10 ESSENTIAL HYPERTENSION: Primary | ICD-10-CM

## 2023-08-03 DIAGNOSIS — I50.22 CHRONIC SYSTOLIC (CONGESTIVE) HEART FAILURE (HCC): ICD-10-CM

## 2023-08-03 PROCEDURE — 93010 ELECTROCARDIOGRAM REPORT: CPT | Performed by: INTERNAL MEDICINE

## 2023-08-03 NOTE — PROGRESS NOTES
Remote Patient Monitoring Treatment Plan    Received request from ACM/CTN Freida Fuller RN  to order remote patient monitoring for in home monitoring of CHF and HTN and order completed. Patient will be monitoring blood pressure   pulse ox   weight. Patient will engage in Remote Patient Monitoring each day to develop the skills necessary for self management. RPM Care Team Responsibilities:   Alerts will be reviewed daily and addressed within 2-4 hours during operational hours (Monday -Friday 9 am-4 pm)  Alert response and intervention documented in patient medical record  Alert response escalated to PCP per protocol and documented in patient medical record  Patient monitored over approximately  days  Discharge from program based on self-management readiness    See care coordination encounters for additional details.

## 2023-08-03 NOTE — CARE COORDINATION
Remote Patient Kit Ordering Note      Date/Time:  8/3/2023 1:50 PM      [] [facility] confirmed patient shipping address  [x] Patient will receive package over the next 1-3 business days. Someone 21 years or older must be present to sign for [employer] delivery. [x] HRS will contact patient within 24 hours, an [address] [place] [address] will call the patient directly: If the patient does not answer, HRS will follow up with the clinical team notifying them about the unsuccessful attempt to contact the patient. HRS will make three call attempts to the patient. Provide patient with [facility] Virtual install number is: [phone]. [x] LPN will contact patient once equipment is active to welcome them to the program.                                                         [x] Hours of RPM monitoring - Monday-Friday [id number]; encourage patient to get vitals entered by [facility] each day to have the alert addressed same day. [x][facility] mailed RPM Patient flyer to patient. LPN made aware the RPM kit has been ordered. EMTP notified patient of RPM equipment order. Paramedic attempted to contact patient in regards to RPM Kit order. Patient is unavailable at this time. Left HIPAA compliant message with Paramedic contact information, reason for call and request for call back. Will expect a return call. RPM Kit Order was completed successfully.

## 2023-08-04 LAB
EKG ATRIAL RATE: 76 BPM
EKG P AXIS: 12 DEGREES
EKG P-R INTERVAL: 170 MS
EKG Q-T INTERVAL: 424 MS
EKG QRS DURATION: 148 MS
EKG QTC CALCULATION (BAZETT): 477 MS
EKG R AXIS: -50 DEGREES
EKG T AXIS: -6 DEGREES
EKG VENTRICULAR RATE: 76 BPM

## 2023-08-07 ENCOUNTER — CARE COORDINATION (OUTPATIENT)
Dept: CARE COORDINATION | Age: 88
End: 2023-08-07

## 2023-08-10 ENCOUNTER — CARE COORDINATION (OUTPATIENT)
Dept: CARE COORDINATION | Age: 88
End: 2023-08-10

## 2023-08-10 NOTE — CARE COORDINATION
Ambulatory Care Coordination Note  8/10/2023    Patient Current Location:  Home: [facility] [address] [id number]     ACM contacted the patient by telephone. Verified name and  with patient as identifiers. Provided introduction to self, and explanation of the ACM role. Challenges to be reviewed by the provider   Additional needs identified to be addressed with provider: No  none               Method of communication with provider: none. ACM: Dennise Bill, RN    Spoke with Yane Fermin for continued Care Coordination  States she is feeling better this week  Discussed RPM set up and she is still preactived  States she contacted H[facility] and has appt to set up equipment tomorrow  Encouraged her to start monitoring vitals after set up  Denies changes from baseline for this review  Has appts scheduled with PCP and specialty offices    Plan  Reinforce education completed  Ensure RPM set up   Encourage daily monitoring of vitals on RPM after activated  Collaborate with specialty offices as needed  Reinforce importance of early symptom recognition and reporting to prevent exacerbation and unnecessary hospitalization  Congestive Heart Failure Assessment    Do you understand a low sodium diet?: Yes  Do you understand how to read food labels?: Yes  Do you salt your food before tasting it?: No         Symptoms:  CHF associated angina: Neg, CHF associated dyspnea on exertion: Pos, CHF associated fatigue: Neg, CHF associated orthostatic hypotension: Neg, CHF associated PND: Neg, CHF associated shortness of breath: Neg, CHF associated weakness: Neg      Symptom course: stable  Patient-reported weight (lb):  (Comment: will start RPM monitoring of weight tomorrow after activation)             Offered patient enrollment in the Remote Patient Monitoring (RPM) program for in-home monitoring:  pre activated .     Lab Results       None            Care Coordination Interventions    Referral from Primary Care Provider: No  Suggested

## 2023-08-15 ENCOUNTER — CARE COORDINATION (OUTPATIENT)
Dept: CASE MANAGEMENT | Age: 88
End: 2023-08-15

## 2023-08-15 NOTE — CARE COORDINATION
Remote Alert Monitoring Note  Rpm alert to be reviewed by the provider   red alert   pulse ox reading (88%)   Additional needs to be addressed by N/A: No                    Date/Time:  8/15/2023 11:52 AM  [facility] Verified patients name and  as identifiers. Background: HTN, CHF  Refer to 911 immediately if:  Patient unresponsive or unable to provide history  Change in cognition or sudden confusion  Patient unable to respond in complete sentences  Intense chest pain/tightness  Any concern for any clinical emergency  Red Alert: Provider response time of 1 hr required for any red alert requiring intervention  Yellow Alert: Provider response time of 3hr required for any escalated yellow alert    O2 Triage  Are you having any Chest Pain? no   Are you having any Shortness of Breath? no   Swelling in your hands or feet? no     Are you having any other health concerns or issues? no      Clinical Interventions: Reviewed and followed up on alerts and treatments-Patient has low SpO2 level of 88%. Denies CP, SOB, Swelling hands and feet, dizziness. Uses Nebulizer and inhalers this morning with relief. Uses Oxygen PRN during the day 4/L activity and 2/L night and at rest.  Rechecked SpO2 at 92%. No further action needed at this time. Education of patient/family/caregiver/guardian to support self-management-Recheck SpO2 level  Have warm hands, hold hands and fingers very still while testing SpO2 level. Take a few deep breaths before testing. Advised Patient to contact PCP  regarding any health concerns for early outpatient intervention in an effort to avoid hospitalization. Report any worsening symptoms to PCP and/or Call 911 and/or GO TO  EMERGENCY ROOM if symptoms are severe or worsening. Expresses understanding. Plan/Follow Up:  Will continue to review, monitor and address alerts with follow up based on severity of symptoms and risk

## 2023-08-16 ENCOUNTER — CARE COORDINATION (OUTPATIENT)
Dept: CARE COORDINATION | Age: 88
End: 2023-08-16

## 2023-08-16 ENCOUNTER — CARE COORDINATION (OUTPATIENT)
Dept: CASE MANAGEMENT | Age: 88
End: 2023-08-16

## 2023-08-16 NOTE — CARE COORDINATION
Left message to return phone  call to complete Care Coordination followup/ RPM welcome call and discuss red alert.

## 2023-08-16 NOTE — CARE COORDINATION
alerts with follow up based on severity of symptoms and risk factors.     Current Patient Metrics ---- Blood Pressure: 142/83, 85bpm Pulseox: 92%, 88bpm Survey: C Weight: 140.4lbs Note Created at: 08/16/2023 11:27 AM ET ---- Time-Spent: 10 minutes 0 seconds

## 2023-08-16 NOTE — CARE COORDINATION
Ambulatory Care Coordination Note  2023    Patient Current Location:  Home: [phone]     ACM contacted the patient by telephone. Verified name and  with patient as identifiers. Provided introduction to self, and explanation of the ACM role. Challenges to be reviewed by the provider   Additional needs identified to be addressed with provider: No  none               Method of communication with provider: none. ACM: Maria C Aguilar, RN    Spoke with Reji Calzada for continued Care Coordination  States she is feeling much better and more back to her baseline  Discussed RPM monitoring and alert from yesterday  States she was not wearing her O2 when she checked her pulse ox  States she is going to call [employer] to ask them about how to charge the ipad because she doesn't see how to charge it  Weight stable  Welcome call information completed    Plan  Encourage continued RPM monitoring  Welcome call completed  Ensure no barriers with charging equipment  Reinforce importance of early symptom recognition and reporting to prevent exacerbation and unnecessary hospitalization  Congestive Heart Failure Assessment    Do you understand a low sodium diet?: Yes  Do you understand how to read food labels?: Yes  Do you salt your food before tasting it?: No         Symptoms:  CHF associated angina: Neg, CHF associated dyspnea on exertion: Pos, CHF associated fatigue: Neg, CHF associated orthostatic hypotension: Neg, CHF associated PND: Neg, CHF associated shortness of breath: Neg, CHF associated weakness: Neg      Symptom course: stable  Patient-reported weight (lb):  (Comment: being monitored by RPM)  Weight trend: stable           Offered patient enrollment in the Remote Patient Monitoring (RPM) program for in-home monitoring: Yes, patient already enrolled.       Remote Patient Monitoring Welcome Note  Date/Time:  2023 10:18 AM  Patient Current Location: Home: [address] [place] [address]

## 2023-08-21 ENCOUNTER — CARE COORDINATION (OUTPATIENT)
Dept: CASE MANAGEMENT | Age: 88
End: 2023-08-21

## 2023-08-21 NOTE — CARE COORDINATION
Remote Alert Monitoring Note  Rpm alert to be reviewed by the provider   red alert   pulse ox reading (90%)   Additional needs to be addressed by N/A: No                    Date/Time:  2023 11:50 AM  Patient Current Location: [facility] contacted patient by telephone. Verified patients name and  as identifiers. Background: HTN, CHF  Refer to 911 immediately if:  Patient unresponsive or unable to provide history  Change in cognition or sudden confusion  Patient unable to respond in complete sentences  Intense chest pain/tightness  Any concern for any clinical emergency  Red Alert: Provider response time of 1 hr required for any red alert requiring intervention  Yellow Alert: Provider response time of 3hr required for any escalated yellow alert    O2 Triage  Are you having any Chest Pain? no   Are you having any Shortness of Breath? no   Swelling in your hands or feet? no     Are you having any other health concerns or issues? no      Clinical Interventions: Reviewed and followed up on alerts and treatments-Patient has low SpO2 level of 90%. Denies CP, SOB, uses Oxygen 4/L activity and 2/L rest and night, swelling hands and feet, dizziness. Patient has used Duoneb inhaler as directed. Rechecked spO2 level at 97%. No further action needed at this time. Education of patient/family/caregiver/guardian to support self-management-Recheck SpO2 level  Have warm hands, hold hands and fingers very still while testing SpO2 level. Take a few deep breaths before testing. Advised Patient to contact PCP  regarding any health concerns for early outpatient intervention in an effort to avoid hospitalization. Report any worsening symptoms to PCP and/or Call 911 and/or GO TO  EMERGENCY ROOM if symptoms are severe or worsening. Expresses understanding. Plan/Follow Up: Will continue to review, monitor and address alerts with follow up based on severity of symptoms and risk factors.      Current Patient

## 2023-08-23 ENCOUNTER — CARE COORDINATION (OUTPATIENT)
Dept: CARE COORDINATION | Age: 88
End: 2023-08-23

## 2023-09-06 ENCOUNTER — CARE COORDINATION (OUTPATIENT)
Dept: CARE COORDINATION | Age: 88
End: 2023-09-06

## 2023-09-06 SDOH — ECONOMIC STABILITY: INCOME INSECURITY: IN THE LAST 12 MONTHS, WAS THERE A TIME WHEN YOU WERE NOT ABLE TO PAY THE MORTGAGE OR RENT ON TIME?: NO

## 2023-09-06 SDOH — ECONOMIC STABILITY: HOUSING INSECURITY: IN THE LAST 12 MONTHS, HOW MANY PLACES HAVE YOU LIVED?: 1

## 2023-09-06 SDOH — ECONOMIC STABILITY: TRANSPORTATION INSECURITY
IN THE PAST 12 MONTHS, HAS LACK OF TRANSPORTATION KEPT YOU FROM MEETINGS, WORK, OR FROM GETTING THINGS NEEDED FOR DAILY LIVING?: NO

## 2023-09-06 SDOH — ECONOMIC STABILITY: TRANSPORTATION INSECURITY
IN THE PAST 12 MONTHS, HAS THE LACK OF TRANSPORTATION KEPT YOU FROM MEDICAL APPOINTMENTS OR FROM GETTING MEDICATIONS?: NO

## 2023-09-06 NOTE — CARE COORDINATION
provider of any barriers to my plan of care. I will follow my Zone Management tool to seek urgent or emergent care. I will notify my provider of any symptoms that indicate a worsening of my condition.     Barriers: need for additional support and education  Plan for overcoming my barriers: family and ACM support  Confidence: 9/10  Anticipated Goal Completion Date: 11-3-23                Future Appointments   Date Time Provider [address]   10/10/2023  3:00 PM STR EXAM ROOM 10 STR OP NURS [facility]   11/6/2023  1:00 PM [facility]othergill[facility]   1/15/2024  2:00 PM PRISCILLA Houston - CNP [facility] Medicine [facility]   5/13/2024  2:00 PM PRISCILLA Armas - [address] [facility]

## 2023-09-14 ENCOUNTER — CARE COORDINATION (OUTPATIENT)
Dept: CASE MANAGEMENT | Age: 88
End: 2023-09-14

## 2023-09-14 NOTE — CARE COORDINATION
Remote Alert Monitoring Note  Rpm alert to be reviewed by the provider   red alert   pulse ox reading (87%)   Additional needs to be addressed by N/A: No                    Date/Time:  9/14/2023 11:43 AM  Patient Current Location: [place]  LPN attempted to contacted patient by telephone. Attempted to reach patient X2 for RPM Red Alert Call. Unable to reach patient. Left HIPAA Compliant message on Voice Mail to call. Phone number left on Voice Mail to call back. Will continue to follow. Yasmine Campos LPN    [phone]  [address] [facility] Coordinator    Patient rechecked SpO2 level at 93%. Background: HTN, CHF  Refer to 911 immediately if:  Patient unresponsive or unable to provide history  Change in cognition or sudden confusion  Patient unable to respond in complete sentences  Intense chest pain/tightness  Any concern for any clinical emergency  Red Alert: Provider response time of 1 hr required for any red alert requiring intervention  Yellow Alert: Provider response time of 3hr required for any escalated yellow alert  Plan/Follow Up: Will continue to review, monitor and address alerts with follow up based on severity of symptoms and risk factors.     Current Patient Metrics ---- Blood Pressure: 145/75, 78bpm Pulseox: 93%, 82bpm Survey: - Weight: 150.6lbs Note Created at: 09/14/2023 01:34 PM ET ---- Time-Spent: 6 minutes 0 seconds

## 2023-09-15 ENCOUNTER — CARE COORDINATION (OUTPATIENT)
Dept: CASE MANAGEMENT | Age: 88
End: 2023-09-15

## 2023-09-15 NOTE — CARE COORDINATION
Remote Alert Monitoring Note  Rpm alert to be reviewed by the provider   red alert   blood pressure reading (171/80)   Additional needs to be addressed by N/A: No                    Date/Time:  9/15/2023 10:54 AM  Patient Current Location: [facility] contacted patient by telephone. Verified patients name and  as identifiers. Background: CHF, HTN  Refer to 911 immediately if:  Patient unresponsive or unable to provide history  Change in cognition or sudden confusion  Patient unable to respond in complete sentences  Intense chest pain/tightness  Any concern for any clinical emergency  Red Alert: Provider response time of 1 hr required for any red alert requiring intervention  Yellow Alert: Provider response time of 3hr required for any escalated yellow alert    BP Triage  Are you having any Chest Pain? no   Are you having any Shortness of Breath? no   Do you have a headache or have any vision changes? no   Are you having any numbness or tingling? no   Are you having any other health concerns or issues? no        Clinical Interventions: Reviewed and followed up on alerts and treatments-Patient has elevated BP of 171/80. Patient denies CP, SOB, Stroke like symptoms, Swelling, Increased NA intake. Patient has not taken Medications yet today, including metoprolol 25 mg daily. Patient states, \"I have been busy cleaning up around here and getting my trash out and then go to the store\". Patient will recheck BP before she goes to the store and after taking medications. Rechecked BP at 158/83. No further action needed at this time. Education of patient/family/caregiver/guardian to support self-management-Instructed patient before she goes to the store to rest about 10 min. And then recheck BP. Instructed to place BP cuff on upper arm snuggly. Sit with feet flat on the floor. Sit quietly and relax for 5 minutes before taking BP.     Advised Patient to contact PCP  regarding any health concerns for early

## 2023-09-20 ENCOUNTER — CARE COORDINATION (OUTPATIENT)
Dept: CARE COORDINATION | Age: 88
End: 2023-09-20

## 2023-09-20 NOTE — CARE COORDINATION
my appointment or reschedule if I have to cancel. I will notify my provider of any barriers to my plan of care. I will follow my Zone Management tool to seek urgent or emergent care. I will notify my provider of any symptoms that indicate a worsening of my condition.     Barriers: need for additional support and education  Plan for overcoming my barriers: family and ACM support  Confidence: 9/10  Anticipated Goal Completion Date: 11-3-23                Future Appointments   Date Time Provider [address]   10/10/2023  3:00 PM STR EXAM ROOM 10 STRZ OP NURS [facility]   11/6/2023  1:00 PM Uzair Null MD N SRPX Heart [facility]   1/15/2024  2:00 PM PRISCILLA Rushing - CNP [facility]   5/13/2024  2:00 PM PRISCILLA Rudd - [address] [facility]

## 2023-09-28 ENCOUNTER — CARE COORDINATION (OUTPATIENT)
Dept: CASE MANAGEMENT | Age: 88
End: 2023-09-28

## 2023-09-28 NOTE — CARE COORDINATION
Remote Alert Monitoring Note  Rpm alert to be reviewed by the provider   red alert   blood pressure reading (146/104)   Additional needs to be addressed by N/A: No                    Date/Time:  2023 11:12 AM  Patient Current Location: [facility] contacted patient by telephone. Verified patients name and  as identifiers. Background: CHF, HTN  Refer to 911 immediately if:  Patient unresponsive or unable to provide history  Change in cognition or sudden confusion  Patient unable to respond in complete sentences  Intense chest pain/tightness  Any concern for any clinical emergency  Red Alert: Provider response time of 1 hr required for any red alert requiring intervention  Yellow Alert: Provider response time of 3hr required for any escalated yellow alert    BP Triage  Are you having any Chest Pain? no   Are you having any Shortness of Breath? no   Do you have a headache or have any vision changes? no   Are you having any numbness or tingling? no   Are you having any other health concerns or issues? no        Clinical Interventions: Reviewed and followed up on alerts and treatments-Patient has elevated BP of 146/104. Patient denies CP, SOB, Stroke like symptoms, swelling, increased NA intake. Patient has taken morning medications including Metoprolol 25 mg daily. Recheck BP for 160/100. No further action needed at this time. Education of patient/family/caregiver/guardian to support self-management-Recheck BP  Instructed to place BP cuff on upper arm snuggly. Sit with feet flat on the floor. Sit quietly and relax for 5 minutes before taking BP. Advised Patient to contact PCP  regarding any health concerns for early outpatient intervention in an effort to avoid hospitalization. Report any worsening symptoms to PCP and/or Call 911 and/or GO TO  EMERGENCY ROOM if symptoms are severe or worsening. Expresses understanding. Plan/Follow Up:  Will continue to review, monitor and address alerts

## 2023-10-03 ENCOUNTER — CARE COORDINATION (OUTPATIENT)
Dept: CARE COORDINATION | Age: 88
End: 2023-10-03

## 2023-10-03 NOTE — CARE COORDINATION
Attempted to reach patient for continued Care Coordination follow up and education. Patient was unavailable at the time of my call, and a generic voicemail message was left asking patient to return my call at [phone].

## 2023-10-04 ENCOUNTER — OFFICE VISIT (OUTPATIENT)
Dept: FAMILY MEDICINE CLINIC | Age: 88
End: 2023-10-04
Payer: MEDICARE

## 2023-10-04 VITALS
SYSTOLIC BLOOD PRESSURE: 128 MMHG | HEIGHT: 61 IN | BODY MASS INDEX: 25.75 KG/M2 | DIASTOLIC BLOOD PRESSURE: 80 MMHG | OXYGEN SATURATION: 83 % | WEIGHT: 136.38 LBS | HEART RATE: 85 BPM

## 2023-10-04 DIAGNOSIS — D64.9 ANEMIA, UNSPECIFIED TYPE: ICD-10-CM

## 2023-10-04 DIAGNOSIS — M05.10 RHEUMATOID LUNG (HCC): ICD-10-CM

## 2023-10-04 DIAGNOSIS — J84.10 PULMONARY FIBROSIS (HCC): ICD-10-CM

## 2023-10-04 DIAGNOSIS — R53.83 OTHER FATIGUE: Primary | ICD-10-CM

## 2023-10-04 LAB
INFLUENZA A ANTIBODY: NORMAL
INFLUENZA B ANTIBODY: NORMAL
Lab: NORMAL
QC PASS/FAIL: NORMAL
SARS-COV-2 RDRP RESP QL NAA+PROBE: NEGATIVE

## 2023-10-04 PROCEDURE — 87804 INFLUENZA ASSAY W/OPTIC: CPT | Performed by: NURSE PRACTITIONER

## 2023-10-04 PROCEDURE — 87635 SARS-COV-2 COVID-19 AMP PRB: CPT | Performed by: NURSE PRACTITIONER

## 2023-10-04 PROCEDURE — 99214 OFFICE O/P EST MOD 30 MIN: CPT | Performed by: NURSE PRACTITIONER

## 2023-10-04 PROCEDURE — 1123F ACP DISCUSS/DSCN MKR DOCD: CPT | Performed by: NURSE PRACTITIONER

## 2023-10-04 RX ORDER — LEVOFLOXACIN 750 MG/1
750 TABLET ORAL DAILY
Qty: 5 TABLET | Refills: 0 | Status: CANCELLED | OUTPATIENT
Start: 2023-10-04 | End: 2023-10-09

## 2023-10-04 ASSESSMENT — ENCOUNTER SYMPTOMS
BLOOD IN STOOL: 0
RHINORRHEA: 0
DIARRHEA: 0
COLOR CHANGE: 0
CONSTIPATION: 0
EYE REDNESS: 0
COUGH: 0
ABDOMINAL DISTENTION: 0
SORE THROAT: 0
NAUSEA: 0
EYE DISCHARGE: 0
ANAL BLEEDING: 0
ABDOMINAL PAIN: 0
SHORTNESS OF BREATH: 0

## 2023-10-06 LAB
ABSOLUTE BASO #: 0.05 K/UL (ref 0–0.2)
ABSOLUTE EOS #: 0.37 K/UL (ref 0–0.5)
ABSOLUTE LYMPH #: 1.41 K/UL (ref 1–4)
ABSOLUTE MONO #: 0.48 K/UL (ref 0.2–1)
ABSOLUTE NEUT #: 3.7 K/UL (ref 1.5–7.5)
APPEARANCE: ABNORMAL
BACTERIA: ABNORMAL PER HPF
BASOPHILS RELATIVE PERCENT: 0.8 %
BILIRUBIN: NEGATIVE
COLOR: YELLOW
CRYSTALS: PRESENT
EOSINOPHILS RELATIVE PERCENT: 6.1 %
EPITHELIAL CELLS: ABNORMAL PER HPF (ref 0–10)
GLUCOSE BLD-MCNC: NEGATIVE MG/DL
HCT VFR BLD CALC: 37.9 % (ref 34–45)
HEMOGLOBIN: 12.6 G/DL (ref 11.5–15.5)
HYALINE CASTS: ABNORMAL
IRON SATURATION: 31 % (ref 20–50)
IRON, SERUM: 89 UG/DL (ref 37–145)
KETONES, URINE: NEGATIVE
LEUKOCYTE ESTERASE, URINE: ABNORMAL
LYMPHOCYTE %: 23.4 %
MCH RBC QN AUTO: 30.3 PG (ref 25–33)
MCHC RBC AUTO-ENTMCNC: 33.2 G/DL (ref 31–36)
MCV RBC AUTO: 91.1 FL (ref 80–99)
MONOCYTES # BLD: 8 %
NEUTROPHILS RELATIVE PERCENT: 61.5 %
NITRITE, URINE: POSITIVE
OCCULT BLOOD,URINE: NEGATIVE
PDW BLD-RTO: 13.4 % (ref 11.5–15)
PH: 6.5 (ref 5–9)
PLATELETS: 138 K/UL (ref 130–400)
PMV BLD AUTO: 9.7 FL (ref 9.3–13)
PROTEIN, URINE: ABNORMAL
RBC: 4.16 M/UL (ref 3.8–5.4)
RBC: ABNORMAL PER HPF (ref 0–5)
SP GRAVITY MISCELLANEOUS: 1.02 (ref 1–1.03)
TOTAL IRON BINDING CAPACITY: 291 UG/DL (ref 250–450)
TSH SERPL DL<=0.05 MIU/L-ACNC: 1.51 UIU/ML (ref 0.4–4.1)
UNSATURATED IRON BINDING CAPACITY: 202 UG/DL (ref 112–347)
UROBILINOGEN, URINE: NORMAL
WBC: 6 K/UL (ref 3.5–11)
WBC: >50 PER HPF (ref 0–5)

## 2023-10-08 LAB — URINE CULTURE, ROUTINE: ABNORMAL

## 2023-10-09 ENCOUNTER — TELEPHONE (OUTPATIENT)
Dept: FAMILY MEDICINE CLINIC | Age: 88
End: 2023-10-09

## 2023-10-09 DIAGNOSIS — N30.90 CYSTITIS: Primary | ICD-10-CM

## 2023-10-09 RX ORDER — CIPROFLOXACIN 500 MG/1
500 TABLET, FILM COATED ORAL 2 TIMES DAILY
Qty: 10 TABLET | Refills: 0 | Status: SHIPPED | OUTPATIENT
Start: 2023-10-09 | End: 2023-10-14

## 2023-10-09 NOTE — TELEPHONE ENCOUNTER
----- Message from PRISCILLA Donovan - CNP sent at 10/9/2023  8:48 AM EDT -----  Urine shows infection - E. Coli - cipro sent to RA - take until gone     CBC  and iron normal - no anemia     TSH normal

## 2023-10-10 ENCOUNTER — CARE COORDINATION (OUTPATIENT)
Dept: CARE COORDINATION | Facility: CLINIC | Age: 88
End: 2023-10-10

## 2023-10-10 ENCOUNTER — CARE COORDINATION (OUTPATIENT)
Dept: CARE COORDINATION | Age: 88
End: 2023-10-10

## 2023-10-10 NOTE — CARE COORDINATION
Remote Alert Monitoring Note  Rpm alert to be reviewed by the provider   red alert   pulse ox reading (91%)   Additional needs to be addressed by N/A: No                  Date/Time:  10/10/2023 12:58 PM  Patient Current [facility] [address]  LPN contacted patient by telephone. Verified patients name and  as identifiers. Background: Pt enrolled in RPM r/t CHF and HTN. Refer to 911 immediately if:  Patient unresponsive or unable to provide history  Change in cognition or sudden confusion  Patient unable to respond in complete sentences  Intense chest pain/tightness  Any concern for any clinical emergency  Red Alert: Provider response time of 1 hr required for any red alert requiring intervention  Yellow Alert: Provider response time of 3hr required for any escalated yellow alert    O2 Triage  Are you having any Chest Pain? no   Are you having any Shortness of Breath? no   Swelling in your hands or feet? no     Are you having any other health concerns or issues? no      Clinical Interventions: Reviewed and followed up on alerts and treatments-Pt speaking in full sentences, wearing home O2 @ 3lpm continuous, and denies acute distress / is asymptomatic. Pt agreeable to recheck pulse ox at this time. Updated reading of 98% reported. Pt v/u of when to notify provider of changes / concerns and when to seek emergent medical care. No further action necessary at this time. Plan/Follow Up: Will continue to review, monitor and address alerts with follow up based on severity of symptoms and risk factors.

## 2023-10-10 NOTE — CARE COORDINATION
Remote Patient Monitoring Note      Date/Time:  10/10/2023 11:28 AM  Patient Current Location: [place]  LPN attempted to contact parent by telephone regarding red alert received for pulse ox reading (91%). Background: Pt enrolled in RPM r/t CHF and HTN. Clinical Interventions:  Left HIPAA compliant message requesting a return call. Plan/Follow Up: Will continue to review, monitor and address alerts with follow up based on severity of symptoms and risk factors.

## 2023-10-10 NOTE — CARE COORDINATION
Ambulatory Care Coordination Note  10/10/2023    Patient Current Location:  Home: [place]  [address] [place] [address] [phone]     ACM contacted the patient by telephone. Verified name and  with patient as identifiers. Provided introduction to self, and explanation of the ACM role. Challenges to be reviewed by the provider   Additional needs identified to be addressed with provider: No  none               Method of communication with provider: none.     ACM: Desmond Meraz, RN    Spoke with Kristian Ryan for continued Care Coordination follow up  Continues to work with RPM and no alerts noted  Encouraged continued entering of vitals into program  Currently being treated for UTI and states she is feeling better  Reports appetite is improved since feeling better  Denies any barriers during this call  Encouraged her to call myself or office with any needs    Plan  Reinforce education completed  Continue to work with RPM   Encourage vital sign entry into program  Ensure UTI resolved  Reinforce importance of early symptom recognition and reporting to prevent exacerbation and unnecessary hospitalization  Congestive Heart Failure Assessment    Do you understand a low sodium diet?: Yes  Do you understand how to read food labels?: Yes  Do you salt your food before tasting it?: No         Symptoms:  CHF associated angina: Neg, CHF associated dyspnea on exertion: Pos, CHF associated fatigue: Neg, CHF associated leg swelling: Neg, CHF associated orthostatic hypotension: Neg, CHF associated PND: Neg, CHF associated shortness of breath: Neg, CHF associated weakness: Neg      Symptom course: stable  Patient-reported weight (lb):  (Comment: being monitored by RPM)  Weight trend: stable      and   General Assessment    Do you have any symptoms that are causing concern?: Yes  Progression since Onset: Gradually Improving  Reported Symptoms: Other (Comment: UTI)             Offered patient enrollment in the Remote Patient Monitoring (RPM) program

## 2023-10-11 ENCOUNTER — CARE COORDINATION (OUTPATIENT)
Dept: CARE COORDINATION | Age: 88
End: 2023-10-11

## 2023-10-11 NOTE — CARE COORDINATION
Remote Alert Monitoring Note  Rpm alert to be reviewed by the provider   red alert   pulse ox reading (90%)   Additional needs to be addressed by N/A: No      Date/Time:  10/11/2023 11:49 AM  Patient Current Location: [facility] contacted patient by telephone. Verified patients name and  as identifiers. Background: Pt enrolled in RPM r/t CHF, HTN. Refer to 911 immediately if:  Patient unresponsive or unable to provide history  Change in cognition or sudden confusion  Patient unable to respond in complete sentences  Intense chest pain/tightness  Any concern for any clinical emergency  Red Alert: Provider response time of 1 hr required for any red alert requiring intervention  Yellow Alert: Provider response time of 3hr required for any escalated yellow alert  O2 Triage  Are you having any Chest Pain? no   Are you having any Shortness of Breath? no   Swelling in your hands or feet? no   Are you having any other health concerns or issues? no   Clinical Interventions: Reviewed and followed up on alerts and treatments-Pt speaking in full sentences, no apparent distress. She is wearing home O2 at 3L and denies any SOB/dyspnea at this time. She has used scheduled nebulizer tx this morning and is agreeable to recheck oxygen level at this time with updated reading of 93%. Pt did not turn tablet on and requested nurse to enter. Also states she had an inaccurate weight recorded this morning due to having issues stepping on scale. Recorded weight noted to be 8.4 lbs less than previous day. Inaccurate weight removed from RPM metrics. Plan/Follow Up: Will continue to review, monitor and address alerts with follow up based on severity of symptoms and risk factors.

## 2023-10-12 ENCOUNTER — HOSPITAL ENCOUNTER (OUTPATIENT)
Dept: NURSING | Age: 88
Discharge: HOME | End: 2023-10-12

## 2023-10-12 ENCOUNTER — CARE COORDINATION (OUTPATIENT)
Dept: CARE COORDINATION | Age: 88
End: 2023-10-12

## 2023-10-12 NOTE — CARE COORDINATION
Remote Alert Monitoring Note  Rpm alert to be reviewed by the provider   red alert   pulse ox reading (88%)   Additional needs to be addressed by N/A: No      Date/Time:  10/12/2023 12:11 PM  Patient Current Location: [facility] contacted patient by telephone. Verified patients name and  as identifiers. Background: Pt enrolled in RPM r/t CHF, HTN. Refer to 911 immediately if:  Patient unresponsive or unable to provide history  Change in cognition or sudden confusion  Patient unable to respond in complete sentences  Intense chest pain/tightness  Any concern for any clinical emergency  Red Alert: Provider response time of 1 hr required for any red alert requiring intervention  Yellow Alert: Provider response time of 3hr required for any escalated yellow alert  O2 Triage  Are you having any Chest Pain? no   Are you having any Shortness of Breath? no   Swelling in your hands or feet? no   Are you having any other health concerns or issues? no   Clinical Interventions: Reviewed and followed up on alerts and treatments-Pt speaking in full sentences, no apparent distress. She is wearing home O2 at 3L and denies any SOB/dyspnea at this time. She has used scheduled nebulizer tx this morning and is agreeable to recheck oxygen level at this time with updated reading of 97%. Pt did not turn tablet on and requested nurse to enter. Education provided to pt that if oxygen level below 92% and she is in no distress, she should reposition oximeter and recheck metric. Pt verbalizes understanding. Plan/Follow Up: Will continue to review, monitor and address alerts with follow up based on severity of symptoms and risk factors.

## 2023-10-13 VITALS
OXYGEN SATURATION: 92 % | BODY MASS INDEX: 28.53 KG/M2 | WEIGHT: 151 LBS | HEART RATE: 79 BPM | DIASTOLIC BLOOD PRESSURE: 66 MMHG | SYSTOLIC BLOOD PRESSURE: 96 MMHG

## 2023-10-20 ENCOUNTER — HOSPITAL ENCOUNTER (OUTPATIENT)
Dept: NURSING | Age: 88
Discharge: HOME | End: 2023-10-20
Payer: MEDICARE

## 2023-10-20 VITALS
HEART RATE: 74 BPM | SYSTOLIC BLOOD PRESSURE: 127 MMHG | TEMPERATURE: 96.9 F | RESPIRATION RATE: 18 BRPM | OXYGEN SATURATION: 89 % | DIASTOLIC BLOOD PRESSURE: 60 MMHG

## 2023-10-20 DIAGNOSIS — M81.0 OSTEOPOROSIS, SENILE: Primary | ICD-10-CM

## 2023-10-20 PROCEDURE — 96372 THER/PROPH/DIAG INJ SC/IM: CPT

## 2023-10-20 PROCEDURE — 6360000002 HC RX W HCPCS: Performed by: EMERGENCY MEDICINE

## 2023-10-20 PROCEDURE — 96365 THER/PROPH/DIAG IV INF INIT: CPT

## 2023-10-20 RX ORDER — SODIUM CHLORIDE 9 MG/ML
INJECTION, SOLUTION INTRAVENOUS CONTINUOUS
OUTPATIENT
Start: 2024-04-05

## 2023-10-20 RX ORDER — DIPHENHYDRAMINE HYDROCHLORIDE 50 MG/ML
50 INJECTION INTRAMUSCULAR; INTRAVENOUS
OUTPATIENT
Start: 2024-04-05

## 2023-10-20 RX ORDER — EPINEPHRINE 1 MG/ML
0.3 INJECTION, SOLUTION INTRAMUSCULAR; SUBCUTANEOUS PRN
OUTPATIENT
Start: 2024-04-05

## 2023-10-20 RX ADMIN — DENOSUMAB 60 MG: 60 INJECTION SUBCUTANEOUS at 15:10

## 2023-10-20 NOTE — PROGRESS NOTES
1500- patient arrived to [facility] via wheelchair with daughter for Prolia injection. Patient has received before and tolerated well. Vitals stable. PT RIGHTS AND RESPONSIBILITIES OFFERED TO PT.     1510- Injection given. Patient tolerated well with no issues. 1515- Discharge instructions reviewed with patient and daughter. Verbalized understanding with no further questions. AVS provided. Discharged via wheelchair to lobby in stable condition.          __M__ Safety:       (Environmental)  [place] to environment  Ensure ID band is correct and in place/ allergy band as needed  Assess for fall risk  Initiate fall precautions as applicable (fall band, side rails, etc.)  Call light within reach  Bed in low position/ wheels locked    __M__ Pain:       Assess pain level and characteristics  Administer analgesics as ordered  Assess effectiveness of pain management and report to MD as needed    __M__ Knowledge Deficit:  Assess baseline knowledge  Provide teaching at level of understanding  Provide teaching via preferred learning method  Evaluate teaching effectiveness    __M__ Hemodynamic/Respiratory Status:       (Pre and Post Procedure Monitoring)  Assess/Monitor vital signs and LOC  Assess Baseline SpO2 prior to any sedation  Obtain weight/height  Assess vital signs/ LOC until patient meets discharge criteria  Monitor procedure site and notify MD of any issues

## 2023-10-20 NOTE — DISCHARGE INSTRUCTIONS
Prolia injection discharge instructions:    Side effects: headache, joint pain, rash, swelling    This medication is given every 6 months. We will need a new order before we schedule your next one due around: April 2024    Please call [phone] with a any questions or concerns.

## 2023-10-24 ENCOUNTER — CARE COORDINATION (OUTPATIENT)
Dept: CARE COORDINATION | Age: 88
End: 2023-10-24

## 2023-10-24 NOTE — CARE COORDINATION
Ambulatory Care Coordination Note  10/24/2023    Patient Current Location:  Home: [place] [id number]     ACM contacted the patient by telephone. Verified name and  with patient as identifiers. Provided introduction to self, and explanation of the ACM role. Challenges to be reviewed by the provider   Additional needs identified to be addressed with provider: No  none               Method of communication with provider: none. ACM: Regina Fitch, RN    Spoke with Guerline Mayer for continued Care Coordination follow up  Continues to be active with RPM  Would like to stay involved until 90 day which is 11-3-23  Discussed any needed equipment  States she has a blood pressure cuff, pulse ox and scale at home to use when RPM is discontinued  Has not yet entered  vitals yet today, encouraged to do so  Discussed alerts with pulse ox, states she is asymptomatic and feels that the pulse ox was reading wrong  Denies any additional barriers during this call    Plan  Work towards graduation from [employer] follow up appts completed  Reinforce the importance of early symptom recognition and reporting to prevent exacerbation and unnecessary hospitalization  Congestive Heart Failure Assessment    Do you understand a low sodium diet?: Yes  Do you understand how to read food labels?: Yes  Do you salt your food before tasting it?: No         Symptoms:  CHF associated angina: Neg, CHF associated dyspnea on exertion: Pos, CHF associated fatigue: Neg, CHF associated orthostatic hypotension: Neg, CHF associated PND: Neg, CHF associated shortness of breath: Neg, CHF associated weakness: Neg      Symptom course: stable  Patient-reported weight (lb): 152  Weight trend: stable           Offered patient enrollment in the Remote Patient Monitoring (RPM) program for in-home monitoring: Yes, patient already enrolled.     Lab Results       None                 Goals Addressed    None         Future Appointments   Date Time

## 2023-10-26 ENCOUNTER — CLINICAL DOCUMENTATION (OUTPATIENT)
Facility: HOSPITAL | Age: 88
End: 2023-10-26

## 2023-10-27 ENCOUNTER — CARE COORDINATION (OUTPATIENT)
Dept: CARE COORDINATION | Age: 88
End: 2023-10-27

## 2023-10-27 NOTE — CARE COORDINATION
Remote Alert Monitoring Note  Rpm alert to be reviewed by the provider   red alert   pulse ox reading (91%)   Additional needs to be addressed by N/A: No      Date/Time:  10/27/2023 10:33 AM  Patient Current Location: [facility] contacted patient by telephone. Verified patients name and  as identifiers. Background: Pt enrolled in RPM r/t CHF, HTN. Refer to 911 immediately if:  Patient unresponsive or unable to provide history  Change in cognition or sudden confusion  Patient unable to respond in complete sentences  Intense chest pain/tightness  Any concern for any clinical emergency  Red Alert: Provider response time of 1 hr required for any red alert requiring intervention  Yellow Alert: Provider response time of 3hr required for any escalated yellow alert  O2 Triage  Are you having any Chest Pain? no   Are you having any Shortness of Breath? no   Swelling in your hands or feet? no   Are you having any other health concerns or issues? no   Clinical Interventions: Reviewed and followed up on alerts and treatments-Pt speaking in full sentences, no apparent distress. Pt verbalizes wearing home O2 at 3L. She had not use nebulizer tx yet today, but is agreeable to recheck oxygen level with updated reading of 92%. Pt educated that if reading is below 92% and she is in no distress, she should reposition oximeter and recheck metric. Pt verbalizes understanding. Plan/Follow Up: Will continue to review, monitor and address alerts with follow up based on severity of symptoms and risk factors.

## 2023-10-31 ENCOUNTER — CARE COORDINATION (OUTPATIENT)
Dept: PRIMARY CARE CLINIC | Age: 88
End: 2023-10-31

## 2023-10-31 ENCOUNTER — TELEPHONE (OUTPATIENT)
Dept: FAMILY MEDICINE CLINIC | Age: 88
End: 2023-10-31

## 2023-10-31 LAB
APPEARANCE: CLEAR
BACTERIA: ABNORMAL PER HPF
BILIRUBIN: NEGATIVE
COLOR: YELLOW
CRYSTALS: PRESENT
EPITHELIAL CELLS: ABNORMAL PER HPF (ref 0–10)
GLUCOSE BLD-MCNC: NEGATIVE MG/DL
HYALINE CASTS: ABNORMAL
KETONES, URINE: NEGATIVE
LEUKOCYTE ESTERASE, URINE: ABNORMAL
NITRITE, URINE: NEGATIVE
OCCULT BLOOD,URINE: NEGATIVE
PH: 6 (ref 5–9)
PROTEIN, URINE: ABNORMAL
RBC: ABNORMAL PER HPF (ref 0–5)
SP GRAVITY MISCELLANEOUS: 1.02 (ref 1–1.03)
UROBILINOGEN, URINE: NORMAL
WBC: ABNORMAL PER HPF (ref 0–5)

## 2023-10-31 NOTE — TELEPHONE ENCOUNTER
----- Message from PRISCILLA Chang CNP sent at 10/31/2023  3:02 PM EDT -----  Urine has improved from last check, advise to drink more water - half her weight in ounces daily.

## 2023-10-31 NOTE — CARE COORDINATION
Remote Patient Monitoring Note  Date/Time:  10/31/2023 2:17 PM  Patient Current Location: [place]  LPN contacted patient by telephone regarding yellow alert received for no BP or weight x > 2 days. Verified patients name and  as identifiers. Background: Pt enrolled in RPM r/t CHF, HTN. Clinical Interventions: Reviewed and followed up on alerts and treatments-Spoke with pt who states she has had a UTI and hasn't felt like using RPM equipment. She is agreeable to update metrics later today. Plan/Follow Up: Will continue to review, monitor and address alerts with follow up based on severity of symptoms and risk factors.

## 2023-11-03 ENCOUNTER — CARE COORDINATION (OUTPATIENT)
Dept: CARE COORDINATION | Age: 88
End: 2023-11-03

## 2023-11-03 RX ORDER — ATORVASTATIN CALCIUM 40 MG/1
TABLET, FILM COATED ORAL
Qty: 90 TABLET | Refills: 0 | Status: SHIPPED | OUTPATIENT
Start: 2023-11-03

## 2023-11-03 NOTE — CARE COORDINATION
Remote Alert Monitoring Note  Rpm alert to be reviewed by the provider   red alert   pulse ox reading (91%)   Additional needs to be addressed by N/A: No      Date/Time:  11/3/2023 10:21 AM  Patient Current Location: [facility] contacted patient by telephone. Verified patients name and  as identifiers. Background: Pt enrolled in RPM r/t HTN, CHF. Refer to 911 immediately if:  Patient unresponsive or unable to provide history  Change in cognition or sudden confusion  Patient unable to respond in complete sentences  Intense chest pain/tightness  Any concern for any clinical emergency  Red Alert: Provider response time of 1 hr required for any red alert requiring intervention  Yellow Alert: Provider response time of 3hr required for any escalated yellow alert  O2 Triage  Are you having any Chest Pain? no   Are you having any Shortness of Breath? no   Swelling in your hands or feet? no   Are you having any other health concerns or issues? no   Clinical Interventions: Reviewed and followed up on alerts and treatments-Pt speaking in full sentences, no apparent distress. Pt verbalizes wearing home O2 at 3L, denies any SOB/dyspnea, cough or congestion at this time. She is getting ready to use a nebulizer tx and states she will update oxygen level after. Pt is on RPM day 81, will route to [facility] for graduation review. Plan/Follow Up: Will continue to review, monitor and address alerts with follow up based on severity of symptoms and risk factors.

## 2023-11-03 NOTE — CARE COORDINATION
11/03/23 11:07 AM Patient is currently enrolled in Remote Patient Monitoring for CHF and HTN. Pt rechecked oxygen level at 11:06 AM with updated reading of 97%. Oxygen level now within RPM parameters. Plan/Follow Up: Will continue to review, monitor and address alerts with follow up based on severity of symptoms and risk factors.

## 2023-11-06 ENCOUNTER — OFFICE VISIT (OUTPATIENT)
Dept: CARDIOLOGY CLINIC | Age: 88
End: 2023-11-06
Payer: MEDICARE

## 2023-11-06 ENCOUNTER — CARE COORDINATION (OUTPATIENT)
Dept: CARE COORDINATION | Age: 88
End: 2023-11-06

## 2023-11-06 VITALS
HEIGHT: 61 IN | BODY MASS INDEX: 28.89 KG/M2 | SYSTOLIC BLOOD PRESSURE: 124 MMHG | DIASTOLIC BLOOD PRESSURE: 70 MMHG | WEIGHT: 153 LBS | HEART RATE: 82 BPM

## 2023-11-06 DIAGNOSIS — I49.1 PAC (PREMATURE ATRIAL CONTRACTION): ICD-10-CM

## 2023-11-06 DIAGNOSIS — I10 PRIMARY HYPERTENSION: ICD-10-CM

## 2023-11-06 DIAGNOSIS — E78.01 FAMILIAL HYPERCHOLESTEROLEMIA: Primary | ICD-10-CM

## 2023-11-06 PROCEDURE — 1123F ACP DISCUSS/DSCN MKR DOCD: CPT | Performed by: NUCLEAR MEDICINE

## 2023-11-06 PROCEDURE — 99213 OFFICE O/P EST LOW 20 MIN: CPT | Performed by: NUCLEAR MEDICINE

## 2023-11-06 RX ORDER — METOPROLOL SUCCINATE 25 MG/1
25 TABLET, EXTENDED RELEASE ORAL DAILY
Qty: 90 TABLET | Refills: 3 | Status: SHIPPED | OUTPATIENT
Start: 2023-11-06

## 2023-11-06 RX ORDER — ATORVASTATIN CALCIUM 40 MG/1
40 TABLET, FILM COATED ORAL EVERY EVENING
Qty: 90 TABLET | Refills: 3 | Status: SHIPPED | OUTPATIENT
Start: 2023-11-06

## 2023-11-06 NOTE — CARE COORDINATION
Skylar Patel is ready for RPM graduation. Please reach out to provide support to get RPM equipment turned back in. Thank you! Remote Patient Monitoring Graduation      Date/Time:  11/6/2023 9:56 AM  Patient Current Location: Home: [id number]  Patient has graduated from the Remote Patient Monitoring program on 11/6/2023. RPM goals have been met at this time. Patient has been provided instruction on process to return RPM equipment and RPM has been deactivated. Patient has ACM's contact information for any further questions, concerns, or needs.   Time Spent: 4 minute

## 2023-11-10 ENCOUNTER — APPOINTMENT (OUTPATIENT)
Dept: GENERAL RADIOLOGY | Age: 88
End: 2023-11-10
Payer: MEDICARE

## 2023-11-10 ENCOUNTER — HOSPITAL ENCOUNTER (EMERGENCY)
Age: 88
Discharge: HOME | End: 2023-11-10
Payer: MEDICARE

## 2023-11-10 VITALS
RESPIRATION RATE: 22 BRPM | TEMPERATURE: 98.3 F | DIASTOLIC BLOOD PRESSURE: 77 MMHG | OXYGEN SATURATION: 90 % | SYSTOLIC BLOOD PRESSURE: 133 MMHG

## 2023-11-10 DIAGNOSIS — Z87.09 HISTORY OF COPD: ICD-10-CM

## 2023-11-10 DIAGNOSIS — J06.9 URI WITH COUGH AND CONGESTION: Primary | ICD-10-CM

## 2023-11-10 LAB — SARS-COV-2 RDRP RESP QL NAA+PROBE: NOT  DETECTED

## 2023-11-10 PROCEDURE — 87635 SARS-COV-2 COVID-19 AMP PRB: CPT

## 2023-11-10 PROCEDURE — 99213 OFFICE O/P EST LOW 20 MIN: CPT | Performed by: NURSE PRACTITIONER

## 2023-11-10 PROCEDURE — 71046 X-RAY EXAM CHEST 2 VIEWS: CPT

## 2023-11-10 PROCEDURE — 99213 OFFICE O/P EST LOW 20 MIN: CPT

## 2023-11-10 RX ORDER — LORATADINE 10 MG
1 CAPSULE ORAL 2 TIMES DAILY PRN
Qty: 60 CAPSULE | Refills: 0 | Status: SHIPPED | OUTPATIENT
Start: 2023-11-10

## 2023-11-10 RX ORDER — PREDNISONE 20 MG/1
20 TABLET ORAL 2 TIMES DAILY
Qty: 10 TABLET | Refills: 0 | Status: SHIPPED | OUTPATIENT
Start: 2023-11-10 | End: 2023-11-15

## 2023-11-10 ASSESSMENT — ENCOUNTER SYMPTOMS
SORE THROAT: 0
SINUS PRESSURE: 1
SHORTNESS OF BREATH: 0
COUGH: 1

## 2023-11-10 ASSESSMENT — PAIN - FUNCTIONAL ASSESSMENT: PAIN_FUNCTIONAL_ASSESSMENT: 0-10

## 2023-11-10 ASSESSMENT — PAIN DESCRIPTION - LOCATION: LOCATION: THROAT

## 2023-11-10 NOTE — ED PROVIDER NOTES
[address]  Urgent Care Encounter       CHIEF COMPLAINT       Chief Complaint   Patient presents with    Sinus Problem       Nurses Notes reviewed and I agree except as noted in the HPI. HISTORY OF PRESENT ILLNESS   Lisandra Rausch is a 80 y.o. female who presents with complaints of sinus congestion and cough. Her symptoms started yesterday. This is a new problem. Denies any known exposure to illness. Reports she wears oxygen at home. Denies any increasing shortness of breath. No treatment has been tried. She does have a history of pulmonary fibrosis and follows with cardiology as well for palpitations. The history is provided by the patient. REVIEW OF SYSTEMS     Review of Systems   Constitutional:  Negative for fatigue and fever. HENT:  Positive for congestion and sinus pressure. Negative for sore throat. Respiratory:  Positive for cough. Negative for shortness of breath. Cardiovascular:  Negative for chest pain. Musculoskeletal:  Negative for myalgias. Neurological:  Negative for headaches.        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    Bronchiectasis ([address]) 09/30/2015    CAD (coronary artery disease)     NON-OBSTRUCTIVE    Diabetes mellitus ([address])     Heart palpitations     HX OF:    History of therapeutic radiation 1998    left breast cancer    Hyperlipidemia     Hypertension     ILD (interstitial lung disease) ([address]) 09/30/2015    w/ Bronchectasis    Osteoarthritis     rhuematoid    Osteoporosis, senile 03/09/2023    Rheumatoid arthritis ([address])     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

## 2023-11-10 NOTE — ED TRIAGE NOTES
Started yesterday , having sinus congestion with drainage, little cough, watery eyes, and a sore throat

## 2023-11-13 ENCOUNTER — CARE COORDINATION (OUTPATIENT)
Dept: CARE COORDINATION | Age: 88
End: 2023-11-13

## 2023-11-13 NOTE — CARE COORDINATION
Ambulatory Care Coordination  ED Follow up Call    Reason for ED visit:  URI with cough/congestion  Status:     improved    Did you call your PCP prior to going to the ED? No      Did you receive a discharge instructions from the Emergency Room? Yes  Review of Instructions:     Understands what to report/when to return?:  Yes   Understands discharge instructions?:  Yes   Following discharge instructions?:  Yes   If not why? Are there any new complaints of pain? No  New Pain Meds? No    Constipation prophylaxis needed? No    If you have a wound is the dressing clean, dry, and intact? N/A  Understands wound care regimen? N/A    Are there any other complaints/concerns that you wish to tell your provider? Spoke with Thanh Kelly. States she was able to turn in all RPM equipment. Monitoring vitals at home with no issues. Discussed UC visit. States she started medication and has 2 days left. States sx's are resolving. Still has \"froggy\" throat but all other sx's resolved. Encouraged to call PCP office is sx's are not resolved for additional intervention. Encouraged to utilize PCP office for issues that can be treated in office. FU appts/Provider:    Future Appointments   Date Time Provider [address]   1/15/2024  2:00 PM PRISCILLA Stockton - CNP [facility]   5/13/2024  2:00 PM PRISCILLA Juarez - [address] [facility]   11/7/2024  1:00 PM Octavio Montero MD N [facility]           New Medications?:   Yes      Medication Reconciliation by phone - Yes  Understands Medications? Yes  Taking Medications? Yes  Can you swallow your pills? Yes    Any further needs in the home i.e. Equipment?   No    Link to services in community?:  No   Which services:

## 2023-11-27 ENCOUNTER — CARE COORDINATION (OUTPATIENT)
Dept: CARE COORDINATION | Age: 88
End: 2023-11-27

## 2023-11-27 NOTE — CARE COORDINATION
Luther Vigil have been working with Rufino Peralta on Care Coordination program to provide support and education to help manage chronic conditions. Pt has met those goals and all education has been completed with no new barriers noted. I would like to graduate pt from Care Coordination at this time pending PCP approval.  Do you approve of this discharge? Please advise. Thank you.

## 2023-12-01 RX ORDER — PANTOPRAZOLE SODIUM 40 MG/1
TABLET, DELAYED RELEASE ORAL
Qty: 90 TABLET | Refills: 1 | OUTPATIENT
Start: 2023-12-01

## 2024-01-17 RX ORDER — METOPROLOL SUCCINATE 25 MG/1
25 TABLET, EXTENDED RELEASE ORAL DAILY
Qty: 90 TABLET | Refills: 3 | Status: SHIPPED | OUTPATIENT
Start: 2024-01-17

## 2024-02-15 ENCOUNTER — TELEPHONE (OUTPATIENT)
Dept: CARDIOLOGY CLINIC | Age: 89
End: 2024-02-15

## 2024-02-15 ENCOUNTER — OFFICE VISIT (OUTPATIENT)
Dept: FAMILY MEDICINE CLINIC | Age: 89
End: 2024-02-15

## 2024-02-15 VITALS
DIASTOLIC BLOOD PRESSURE: 64 MMHG | RESPIRATION RATE: 16 BRPM | BODY MASS INDEX: 27.62 KG/M2 | SYSTOLIC BLOOD PRESSURE: 102 MMHG | HEART RATE: 88 BPM | WEIGHT: 146.2 LBS | TEMPERATURE: 97.9 F

## 2024-02-15 DIAGNOSIS — M81.0 OSTEOPOROSIS, SENILE: Primary | ICD-10-CM

## 2024-02-15 DIAGNOSIS — R53.82 CHRONIC FATIGUE: ICD-10-CM

## 2024-02-15 DIAGNOSIS — E78.5 HYPERLIPIDEMIA, UNSPECIFIED HYPERLIPIDEMIA TYPE: Primary | ICD-10-CM

## 2024-02-15 DIAGNOSIS — K21.9 GASTROESOPHAGEAL REFLUX DISEASE WITHOUT ESOPHAGITIS: ICD-10-CM

## 2024-02-15 DIAGNOSIS — M81.0 OSTEOPOROSIS, SENILE: ICD-10-CM

## 2024-02-15 DIAGNOSIS — E55.9 VITAMIN D DEFICIENCY: ICD-10-CM

## 2024-02-15 DIAGNOSIS — H61.21 HEARING LOSS DUE TO CERUMEN IMPACTION, RIGHT: ICD-10-CM

## 2024-02-15 DIAGNOSIS — J30.89 NON-SEASONAL ALLERGIC RHINITIS, UNSPECIFIED TRIGGER: ICD-10-CM

## 2024-02-15 RX ORDER — PANTOPRAZOLE SODIUM 40 MG/1
40 TABLET, DELAYED RELEASE ORAL DAILY
Qty: 90 TABLET | Refills: 3 | Status: SHIPPED | OUTPATIENT
Start: 2024-02-15

## 2024-02-15 RX ORDER — LEVOCETIRIZINE DIHYDROCHLORIDE 5 MG/1
5 TABLET, FILM COATED ORAL NIGHTLY
Qty: 30 TABLET | Refills: 11 | Status: SHIPPED | OUTPATIENT
Start: 2024-02-15

## 2024-02-15 RX ORDER — DENOSUMAB 60 MG/ML
60 INJECTION SUBCUTANEOUS
Qty: 1 ML | Refills: 1 | Status: SHIPPED | OUTPATIENT
Start: 2024-02-15

## 2024-02-15 NOTE — PROGRESS NOTES
Negative for facial asymmetry, speech difficulty and weakness.   Hematological:  Does not bruise/bleed easily.   Psychiatric/Behavioral:  Negative for agitation and confusion.        Objective:     Vitals:    02/15/24 1315   BP: 102/64   Pulse: 88   Resp: 16   Temp: 97.9 °F (36.6 °C)   TempSrc: Oral   Weight: 66.3 kg (146 lb 3.2 oz)       Body mass index is 27.62 kg/m².    @LASfTWT(3)@  BP Readings from Last 3 Encounters:   02/15/24 102/64   11/10/23 133/77   11/06/23 124/70     Physical Exam  Constitutional:       General: She is not in acute distress.     Appearance: Normal appearance. She is well-developed. She is not ill-appearing or diaphoretic.   HENT:      Head: Normocephalic and atraumatic.      Right Ear: Hearing and external ear normal. No decreased hearing noted. There is impacted cerumen.      Left Ear: Hearing and external ear normal. No decreased hearing noted.      Nose: Nose normal. No nasal deformity.   Eyes:      General:         Right eye: No discharge.         Left eye: No discharge.      Conjunctiva/sclera: Conjunctivae normal.   Cardiovascular:      Rate and Rhythm: Normal rate and regular rhythm.   Pulmonary:      Effort: Pulmonary effort is normal. No respiratory distress.      Breath sounds: No wheezing.   Abdominal:      General: There is no distension.      Tenderness: There is no guarding.   Musculoskeletal:         General: No tenderness or deformity. Normal range of motion.      Cervical back: Normal range of motion and neck supple.   Skin:     Coloration: Skin is not pale.      Findings: No erythema or rash (On exposed areas).   Neurological:      General: No focal deficit present.      Mental Status: She is alert and oriented to person, place, and time.      Gait: Gait normal.   Psychiatric:         Mood and Affect: Mood normal.         Speech: Speech normal.         Behavior: Behavior normal.         Thought Content: Thought content normal.         Judgment: Judgment normal.

## 2024-02-15 NOTE — TELEPHONE ENCOUNTER
Patient seen by PCP today. /64 HR 88.  Patient denies any symptoms. Patient states she was told to notify Dr. Paz of her BP.    Patient hasn't been checking BP at home. Advised patient to start checking daily and call our office back in one week with readings. Patient voiced understanding.

## 2024-02-17 LAB
CHOLESTEROL/HDL RATIO: 2.9 RATIO
CHOLESTEROL: 270 MG/DL
FOLATE: >20 UG/L
HDLC SERPL-MCNC: 93 MG/DL
LDL CHOLESTEROL CALCULATED: 152 MG/DL
LDL/HDL RATIO: 1.6 RATIO
TRIGL SERPL-MCNC: 124 MG/DL
VITAMIN B-12: 560 PG/ML (ref 200–950)
VITAMIN D 25-HYDROXY: 27 NG/ML

## 2024-02-21 ENCOUNTER — TELEPHONE (OUTPATIENT)
Dept: FAMILY MEDICINE CLINIC | Age: 89
End: 2024-02-21

## 2024-02-21 DIAGNOSIS — J30.89 NON-SEASONAL ALLERGIC RHINITIS, UNSPECIFIED TRIGGER: ICD-10-CM

## 2024-02-21 DIAGNOSIS — K21.9 GASTROESOPHAGEAL REFLUX DISEASE WITHOUT ESOPHAGITIS: ICD-10-CM

## 2024-02-21 RX ORDER — PANTOPRAZOLE SODIUM 40 MG/1
40 TABLET, DELAYED RELEASE ORAL DAILY
Qty: 14 TABLET | Refills: 0 | Status: SHIPPED | OUTPATIENT
Start: 2024-02-21

## 2024-02-21 RX ORDER — LEVOCETIRIZINE DIHYDROCHLORIDE 5 MG/1
5 TABLET, FILM COATED ORAL NIGHTLY
Qty: 30 TABLET | Refills: 11 | OUTPATIENT
Start: 2024-02-21

## 2024-02-21 RX ORDER — LEVOCETIRIZINE DIHYDROCHLORIDE 5 MG/1
5 TABLET, FILM COATED ORAL NIGHTLY
Qty: 90 TABLET | Refills: 3 | Status: SHIPPED | OUTPATIENT
Start: 2024-02-21

## 2024-02-21 NOTE — TELEPHONE ENCOUNTER
This medication refill is regarding a telephone request. Refill requested by pts friend Mr. Schulz.    Requested Prescriptions     Pending Prescriptions Disp Refills    pantoprazole (PROTONIX) 40 MG tablet 14 tablet 0     Sig: Take 1 tablet by mouth daily     Requesting a 14 day supply of medication until her mail in arrives.   Uses [facility]    Rx verified, ordered and set to EP.    MARYP

## 2024-02-21 NOTE — TELEPHONE ENCOUNTER
Pt called stating she got an email from [facility] asking for the office to contact them about an Rx. I called [phone] and spoke to Tori and she said that the was sent as a 30 day supply and it needs to be sent for 90 days since it is a mail in pharmacy.     Rx verified, ordered and set to EP.     Pt notified the above was done.

## 2024-02-21 NOTE — TELEPHONE ENCOUNTER
This medication refill is regarding a fax request. Refill requested by  pharmacy .    Requested Prescriptions     Pending Prescriptions Disp Refills    levocetirizine (XYZAL) 5 MG tablet 30 tablet 11     Sig: Take 1 tablet by mouth nightly       Date of last visit: 2/15/2024   Date of next visit: Visit date not found  Date of last refill:   Pharmacy Name: pharmacy requesting 90 day supply    Last Lipid Panel:    Lab Results   Component Value Date/Time    CHOL 270 02/16/2024 10:20 AM    CHOL 158 12/02/2021 10:29 AM    TRIG 124 02/16/2024 10:20 AM    HDL 93 02/16/2024 10:20 AM    LDLCALC 152 02/16/2024 10:20 AM     Last CMP:   Lab Results   Component Value Date     08/02/2023    K 4.5 08/02/2023     08/02/2023    CO2 30 08/02/2023    BUN 16 08/02/2023    CREATININE 0.8 08/02/2023    GLUCOSE NEGATIVE 10/30/2023    CALCIUM 9.8 08/02/2023    PROT 5.6 (L) 01/28/2023    LABALBU 3.3 (L) 01/28/2023    BILITOT NEGATIVE 10/30/2023    ALKPHOS 59 01/28/2023    AST 22 01/28/2023    ALT 14 01/28/2023    LABGLOM >60 08/02/2023       Last Thyroid:    Lab Results   Component Value Date    TSH 1.510 10/05/2023    T4FREE 1.34 01/31/2023     Last Hemoglobin A1C:    Lab Results   Component Value Date/Time    LABA1C 5.9 07/19/2023 10:28 AM       Rx verified, ordered and set to EP.

## 2024-02-21 NOTE — TELEPHONE ENCOUNTER
Patient informed, and states she takes Lipitor once in the evening daily. Patient agreed to start Vit D.    Please advise.

## 2024-02-21 NOTE — TELEPHONE ENCOUNTER
----- Message from PRISCILLA Spencer - CNP sent at 2/19/2024 12:40 PM EST -----  Cholesterol is elevated.  Total cholesterol 270, should be less than 200.  LDL is elevated at 152, should be less than 100.  She taking her Lipitor 40 daily as directed?    Vitamin d low at 27 - increase D3 to 5000 IU daily

## 2024-02-21 NOTE — TELEPHONE ENCOUNTER
Encourage strict adherence to a low-cholesterol diet - avoiding greasy, fatty, fried foods, red meats. Also, take her fish oil daily as directed.    diminished

## 2024-02-22 RX ORDER — EPINEPHRINE 1 MG/ML
0.3 INJECTION, SOLUTION, CONCENTRATE INTRAVENOUS PRN
OUTPATIENT
Start: 2024-04-22

## 2024-02-22 RX ORDER — SODIUM CHLORIDE 9 MG/ML
INJECTION, SOLUTION INTRAVENOUS CONTINUOUS
OUTPATIENT
Start: 2024-04-22

## 2024-02-22 RX ORDER — DIPHENHYDRAMINE HYDROCHLORIDE 50 MG/ML
50 INJECTION INTRAMUSCULAR; INTRAVENOUS
OUTPATIENT
Start: 2024-04-22

## 2024-02-25 DIAGNOSIS — K21.9 GASTROESOPHAGEAL REFLUX DISEASE WITHOUT ESOPHAGITIS: ICD-10-CM

## 2024-02-26 RX ORDER — PANTOPRAZOLE SODIUM 40 MG/1
40 TABLET, DELAYED RELEASE ORAL DAILY
Qty: 14 TABLET | Refills: 0 | OUTPATIENT
Start: 2024-02-26

## 2024-03-05 RX ORDER — METOPROLOL SUCCINATE 25 MG/1
25 TABLET, EXTENDED RELEASE ORAL DAILY
Qty: 90 TABLET | Refills: 3 | Status: SHIPPED | OUTPATIENT
Start: 2024-03-05

## 2024-03-15 ENCOUNTER — TELEPHONE (OUTPATIENT)
Dept: CARDIOLOGY CLINIC | Age: 89
End: 2024-03-15

## 2024-03-15 NOTE — TELEPHONE ENCOUNTER
Spoke to patient will take BP readings for the next  couple days and get back to us. Patient understands if BP goes up and not feeling well to seek medical attention.

## 2024-03-18 ENCOUNTER — HOSPITAL ENCOUNTER (OUTPATIENT)
Dept: GENERAL RADIOLOGY | Age: 89
Discharge: HOME | End: 2024-03-18
Payer: MEDICARE

## 2024-03-18 ENCOUNTER — OFFICE VISIT (OUTPATIENT)
Dept: FAMILY MEDICINE CLINIC | Age: 89
End: 2024-03-18
Payer: MEDICARE

## 2024-03-18 ENCOUNTER — HOSPITAL ENCOUNTER (OUTPATIENT)
Age: 89
Discharge: HOME | End: 2024-03-18
Payer: MEDICARE

## 2024-03-18 ENCOUNTER — TELEPHONE (OUTPATIENT)
Dept: FAMILY MEDICINE CLINIC | Age: 89
End: 2024-03-18

## 2024-03-18 VITALS
DIASTOLIC BLOOD PRESSURE: 64 MMHG | TEMPERATURE: 97.4 F | OXYGEN SATURATION: 88 % | HEART RATE: 84 BPM | WEIGHT: 143.2 LBS | SYSTOLIC BLOOD PRESSURE: 96 MMHG | BODY MASS INDEX: 27.06 KG/M2

## 2024-03-18 DIAGNOSIS — R53.83 FATIGUE, UNSPECIFIED TYPE: ICD-10-CM

## 2024-03-18 DIAGNOSIS — J84.10 PULMONARY FIBROSIS (HCC): ICD-10-CM

## 2024-03-18 DIAGNOSIS — I50.22 CHRONIC SYSTOLIC (CONGESTIVE) HEART FAILURE (HCC): ICD-10-CM

## 2024-03-18 DIAGNOSIS — E11.69 TYPE 2 DIABETES MELLITUS WITH OTHER SPECIFIED COMPLICATION, WITHOUT LONG-TERM CURRENT USE OF INSULIN (HCC): ICD-10-CM

## 2024-03-18 DIAGNOSIS — E78.5 HYPERLIPIDEMIA, UNSPECIFIED HYPERLIPIDEMIA TYPE: ICD-10-CM

## 2024-03-18 DIAGNOSIS — E78.5 HYPERLIPIDEMIA, UNSPECIFIED HYPERLIPIDEMIA TYPE: Primary | ICD-10-CM

## 2024-03-18 DIAGNOSIS — M46.1 SACROILIITIS (HCC): ICD-10-CM

## 2024-03-18 DIAGNOSIS — R53.1 WEAKNESS: ICD-10-CM

## 2024-03-18 PROBLEM — E11.9 TYPE 2 DIABETES MELLITUS (HCC): Status: ACTIVE | Noted: 2024-03-18

## 2024-03-18 LAB
ALBUMIN SERPL BCG-MCNC: 4.1 G/DL (ref 3.5–5.1)
ALP SERPL-CCNC: 61 U/L (ref 38–126)
ALT SERPL W/O P-5'-P-CCNC: 7 U/L (ref 11–66)
ANION GAP SERPL CALC-SCNC: 14 MEQ/L (ref 8–16)
AST SERPL-CCNC: 17 U/L (ref 5–40)
BASOPHILS ABSOLUTE: 0.1 THOU/MM3 (ref 0–0.1)
BASOPHILS NFR BLD AUTO: 0.9 %
BILIRUB SERPL-MCNC: 0.6 MG/DL (ref 0.3–1.2)
BUN SERPL-MCNC: 17 MG/DL (ref 7–22)
CALCIUM SERPL-MCNC: 9.9 MG/DL (ref 8.5–10.5)
CHLORIDE SERPL-SCNC: 101 MEQ/L (ref 98–111)
CO2 SERPL-SCNC: 26 MEQ/L (ref 23–33)
CREAT SERPL-MCNC: 0.8 MG/DL (ref 0.4–1.2)
DEPRECATED RDW RBC AUTO: 45.3 FL (ref 35–45)
EKG ATRIAL RATE: 74 BPM
EKG P AXIS: 26 DEGREES
EKG P-R INTERVAL: 158 MS
EKG Q-T INTERVAL: 448 MS
EKG QRS DURATION: 144 MS
EKG QTC CALCULATION (BAZETT): 497 MS
EKG R AXIS: -57 DEGREES
EKG T AXIS: 84 DEGREES
EKG VENTRICULAR RATE: 74 BPM
EOSINOPHIL NFR BLD AUTO: 2.4 %
EOSINOPHILS ABSOLUTE: 0.2 THOU/MM3 (ref 0–0.4)
ERYTHROCYTE [DISTWIDTH] IN BLOOD BY AUTOMATED COUNT: 13.4 % (ref 11.5–14.5)
GFR SERPL CREATININE-BSD FRML MDRD: > 60 ML/MIN/1.73M2
GLUCOSE SERPL-MCNC: 169 MG/DL (ref 70–108)
HCT VFR BLD AUTO: 43.8 % (ref 37–47)
HGB BLD-MCNC: 14.3 GM/DL (ref 12–16)
IMM GRANULOCYTES # BLD AUTO: 0.01 THOU/MM3 (ref 0–0.07)
IMM GRANULOCYTES NFR BLD AUTO: 0.1 %
LYMPHOCYTES ABSOLUTE: 1.2 THOU/MM3 (ref 1–4.8)
LYMPHOCYTES NFR BLD AUTO: 18 %
MCH RBC QN AUTO: 30.4 PG (ref 26–33)
MCHC RBC AUTO-ENTMCNC: 32.6 GM/DL (ref 32.2–35.5)
MCV RBC AUTO: 93.2 FL (ref 81–99)
MONOCYTES ABSOLUTE: 0.5 THOU/MM3 (ref 0.4–1.3)
MONOCYTES NFR BLD AUTO: 7.5 %
NEUTROPHILS NFR BLD AUTO: 71.1 %
NRBC BLD AUTO-RTO: 0 /100 WBC
PLATELET # BLD AUTO: 166 THOU/MM3 (ref 130–400)
PMV BLD AUTO: 8.8 FL (ref 9.4–12.4)
POTASSIUM SERPL-SCNC: 3.6 MEQ/L (ref 3.5–5.2)
PROT SERPL-MCNC: 7.6 G/DL (ref 6.1–8)
RBC # BLD AUTO: 4.7 MILL/MM3 (ref 4.2–5.4)
SEGMENTED NEUTROPHILS ABSOLUTE COUNT: 4.8 THOU/MM3 (ref 1.8–7.7)
SODIUM SERPL-SCNC: 141 MEQ/L (ref 135–145)
T4 FREE SERPL-MCNC: 1.43 NG/DL (ref 0.93–1.68)
TSH SERPL DL<=0.005 MIU/L-ACNC: 1.52 UIU/ML (ref 0.4–4.2)
WBC # BLD AUTO: 6.8 THOU/MM3 (ref 4.8–10.8)

## 2024-03-18 PROCEDURE — 93010 ELECTROCARDIOGRAM REPORT: CPT | Performed by: INTERNAL MEDICINE

## 2024-03-18 PROCEDURE — 99214 OFFICE O/P EST MOD 30 MIN: CPT | Performed by: NURSE PRACTITIONER

## 2024-03-18 PROCEDURE — 93005 ELECTROCARDIOGRAM TRACING: CPT

## 2024-03-18 PROCEDURE — 84443 ASSAY THYROID STIM HORMONE: CPT

## 2024-03-18 PROCEDURE — 36415 COLL VENOUS BLD VENIPUNCTURE: CPT

## 2024-03-18 PROCEDURE — 71046 X-RAY EXAM CHEST 2 VIEWS: CPT

## 2024-03-18 PROCEDURE — 80053 COMPREHEN METABOLIC PANEL: CPT

## 2024-03-18 PROCEDURE — 85025 COMPLETE CBC W/AUTO DIFF WBC: CPT

## 2024-03-18 PROCEDURE — 84439 ASSAY OF FREE THYROXINE: CPT

## 2024-03-18 PROCEDURE — 1123F ACP DISCUSS/DSCN MKR DOCD: CPT | Performed by: NURSE PRACTITIONER

## 2024-03-18 SDOH — ECONOMIC STABILITY: INCOME INSECURITY: HOW HARD IS IT FOR YOU TO PAY FOR THE VERY BASICS LIKE FOOD, HOUSING, MEDICAL CARE, AND HEATING?: PATIENT DECLINED

## 2024-03-18 ASSESSMENT — ENCOUNTER SYMPTOMS
ABDOMINAL PAIN: 0
CHANGE IN BOWEL HABIT: 0

## 2024-03-18 NOTE — TELEPHONE ENCOUNTER
[place] pt with Dr. Paz  Patient seen in our office today with c/o fluctuating blood pressure, fatigue, and lightheadedness. Our provider is requesting that your office contact patient to set up sooner appointment than in January.  Patient will be completing some outpt labs/EKG/CXR etc today.

## 2024-03-18 NOTE — PROGRESS NOTES
2 weeks for follow up  - ER for any acute changes.   - Call office with any questions or concerns, or if symptoms are getting worse or changing      Return if symptoms worsen or fail to improve.    Patient given educational materials - see patient instructions.  Discussed use, benefit, and side effects of prescribed medications.  All patient questions answered.  Pt voiced understanding. Reviewed health maintenance.     An electronic signature was used to authenticate this note.    --PRISCILLA LINARES - CNP on 3/19/2024 at 8:32 AM

## 2024-03-19 ENCOUNTER — TELEPHONE (OUTPATIENT)
Dept: CARDIOLOGY CLINIC | Age: 89
End: 2024-03-19

## 2024-03-19 ENCOUNTER — TELEPHONE (OUTPATIENT)
Dept: FAMILY MEDICINE CLINIC | Age: 89
End: 2024-03-19

## 2024-03-19 ASSESSMENT — ENCOUNTER SYMPTOMS
SORE THROAT: 0
COUGH: 1
NAUSEA: 0
SWOLLEN GLANDS: 0
VISUAL CHANGE: 0

## 2024-03-19 NOTE — TELEPHONE ENCOUNTER
Patient has an appt with jp on Monday 03/25 at 2 pm. She said that the lady she spoke with offered her an appt tomorrow at 1130 and her daughter said that would work better for her. Please contact the patient if this appt is still an option or if there is a sooner appt, otherwise she said she can make the appt on Monday work.

## 2024-03-19 NOTE — TELEPHONE ENCOUNTER
Called and notified pt's daughter ok per HIPAA. She will notify the pt. Pt is scheduled to see Cardiology this week and i gave her the pulmonologist office contact information.

## 2024-03-19 NOTE — TELEPHONE ENCOUNTER
----- Message from PRISCILLA Samuel CNP sent at 3/19/2024  7:43 AM EDT -----  Please let pt know that her labs look normal.  Her EKG was abnormal, I would recommend follow up with cardiology for further evaluation, call that office for appt.  Pt needs to go to ER for any acute changes, chest pain, dizziness, SOB ect. -WS

## 2024-03-19 NOTE — TELEPHONE ENCOUNTER
----- Message from PRISCILLA Samuel CNP sent at 3/19/2024  7:44 AM EDT -----  Please let pt know that her labs look normal.  Her EKG was abnormal, I would recommend follow up with cardiology for further evaluation, call that office for appt.  Pt needs to go to ER for any acute changes, chest pain, dizziness, SOB ect. -WS

## 2024-03-19 NOTE — TELEPHONE ENCOUNTER
Patient is calling in regarding this.  Her daughter comes from [place] to take her to [facility] and she is not available Thursday 3/21/2024.  Could she be seen Wednesday or Friday this week? But nothing too early, afternoons are better.  Please call patient again.

## 2024-03-19 NOTE — TELEPHONE ENCOUNTER
----- Message from PRISCILLA Samuel CNP sent at 3/19/2024  7:48 AM EDT -----  Please let pt know that her chest x-ray is also abnormal, this could be contributing to her fatigue.  I would recommend follow up with pulmonary ASAP as well.  Please also see other result notes from same date. -WS

## 2024-03-20 ENCOUNTER — CARE COORDINATION (OUTPATIENT)
Dept: CARE COORDINATION | Age: 89
End: 2024-03-20

## 2024-03-20 ENCOUNTER — OFFICE VISIT (OUTPATIENT)
Dept: CARDIOLOGY CLINIC | Age: 89
End: 2024-03-20
Payer: MEDICARE

## 2024-03-20 VITALS
DIASTOLIC BLOOD PRESSURE: 70 MMHG | SYSTOLIC BLOOD PRESSURE: 118 MMHG | HEIGHT: 61 IN | WEIGHT: 143 LBS | HEART RATE: 66 BPM | BODY MASS INDEX: 27 KG/M2

## 2024-03-20 DIAGNOSIS — I10 PRIMARY HYPERTENSION: Primary | ICD-10-CM

## 2024-03-20 DIAGNOSIS — R53.1 WEAKNESS: Primary | ICD-10-CM

## 2024-03-20 DIAGNOSIS — R53.83 FATIGUE, UNSPECIFIED TYPE: ICD-10-CM

## 2024-03-20 DIAGNOSIS — M81.0 OSTEOPOROSIS, SENILE: ICD-10-CM

## 2024-03-20 DIAGNOSIS — R09.02 HYPOXIA: ICD-10-CM

## 2024-03-20 DIAGNOSIS — I49.1 PAC (PREMATURE ATRIAL CONTRACTION): ICD-10-CM

## 2024-03-20 DIAGNOSIS — J84.10 PULMONARY FIBROSIS (HCC): ICD-10-CM

## 2024-03-20 PROCEDURE — 99214 OFFICE O/P EST MOD 30 MIN: CPT | Performed by: NURSE PRACTITIONER

## 2024-03-20 PROCEDURE — 1123F ACP DISCUSS/DSCN MKR DOCD: CPT | Performed by: NURSE PRACTITIONER

## 2024-03-20 SDOH — ECONOMIC STABILITY: INCOME INSECURITY: IN THE LAST 12 MONTHS, WAS THERE A TIME WHEN YOU WERE NOT ABLE TO PAY THE MORTGAGE OR RENT ON TIME?: NO

## 2024-03-20 SDOH — SOCIAL STABILITY: SOCIAL NETWORK
DO YOU BELONG TO ANY CLUBS OR ORGANIZATIONS SUCH AS CHURCH GROUPS UNIONS, FRATERNAL OR ATHLETIC GROUPS, OR SCHOOL GROUPS?: NO

## 2024-03-20 SDOH — ECONOMIC STABILITY: FOOD INSECURITY: WITHIN THE PAST 12 MONTHS, THE FOOD YOU BOUGHT JUST DIDN'T LAST AND YOU DIDN'T HAVE MONEY TO GET MORE.: NEVER TRUE

## 2024-03-20 SDOH — HEALTH STABILITY: PHYSICAL HEALTH: ON AVERAGE, HOW MANY DAYS PER WEEK DO YOU ENGAGE IN MODERATE TO STRENUOUS EXERCISE (LIKE A BRISK WALK)?: 0 DAYS

## 2024-03-20 SDOH — ECONOMIC STABILITY: HOUSING INSECURITY: IN THE LAST 12 MONTHS, HOW MANY PLACES HAVE YOU LIVED?: 1

## 2024-03-20 SDOH — SOCIAL STABILITY: SOCIAL NETWORK: IN A TYPICAL WEEK, HOW MANY TIMES DO YOU TALK ON THE PHONE WITH FAMILY, FRIENDS, OR NEIGHBORS?: ONCE A WEEK

## 2024-03-20 SDOH — SOCIAL STABILITY: SOCIAL NETWORK: HOW OFTEN DO YOU GET TOGETHER WITH FRIENDS OR RELATIVES?: ONCE A WEEK

## 2024-03-20 SDOH — HEALTH STABILITY: MENTAL HEALTH
STRESS IS WHEN SOMEONE FEELS TENSE, NERVOUS, ANXIOUS, OR CAN'T SLEEP AT NIGHT BECAUSE THEIR MIND IS TROUBLED. HOW STRESSED ARE YOU?: ONLY A LITTLE

## 2024-03-20 SDOH — ECONOMIC STABILITY: FOOD INSECURITY: WITHIN THE PAST 12 MONTHS, YOU WORRIED THAT YOUR FOOD WOULD RUN OUT BEFORE YOU GOT MONEY TO BUY MORE.: NEVER TRUE

## 2024-03-20 SDOH — ECONOMIC STABILITY: INCOME INSECURITY: HOW HARD IS IT FOR YOU TO PAY FOR THE VERY BASICS LIKE FOOD, HOUSING, MEDICAL CARE, AND HEATING?: NOT HARD AT ALL

## 2024-03-20 SDOH — HEALTH STABILITY: PHYSICAL HEALTH: ON AVERAGE, HOW MANY MINUTES DO YOU ENGAGE IN EXERCISE AT THIS LEVEL?: 0 MIN

## 2024-03-20 NOTE — PROGRESS NOTES
Patient here for a follow up for fluctuating blood pressure    Also states she has abnormal EKG and chest xray. Daughter has noticed also some more confusion     EKG done 3/18/24    C/o sob, fatigue and light headed    Denies chest pain and GARCIA   
question as to has full ability to operate/adjust as she should. Daughter present for discussion and now more fully aware of likelihood patient not using O2 adequately; creating many of her issues - likely hypoxic. Not eating, drinking adequately - does not want to get up and about. Also contributing to some of her sx.  Discussed options of assistance; home health. Daughter will contact PCP for assistance; willing to help support in home assistance to keep her safe. Patient mildly reluctant - more acceptive of help the more discussed.     Plan:  Return in about 3 months (around 6/20/2024).  As above  Continue risk factor modification and medical management  Continue current medications as prescribed.    Try to drink about 36 ounces of water a day.    Use the oxygen at 2 liters when at rest and then at 4 liters with activity.     Check on home health options for you.     Consider investigating living facilities closer to your daughter.     Have the blood pressure checked for accuracy and for ease of use.     Follow-up with your PCP as scheduled.    Follow-up with Pulmonology on 4/10/24 as scheduled.     Follow-up with Dr. Paz or Tania CHAPMAN in 3 months  as scheduled or sooner if need.     Thank you for allowing me to participate in the care of your patient. Please don't hesitate to contact me regarding any further issues related to the patient care    Orders Placed:  No orders of the defined types were placed in this encounter.      Prescribed:  No orders of the defined types were placed in this encounter.         Discussed use, benefit, and side effects of prescribed medications. All patient questions answered. Pt voicedunderstanding. Instructed to continue current medications, diet and exercise. Continue risk factor modification and medical management. Patient agreed with treatment plan. Follow up as directed.    Electronically signedby PRISCILLA Beltran CNP on 3/31/2024 at 5:50 AM

## 2024-03-20 NOTE — PATIENT INSTRUCTIONS
Continue current medications as prescribed.    Try to drink about 36 ounces of water a day.    Use the oxygen at 2 liters when at rest and then at 4 liters with activity.     Will check on home health options for you.     Consider investigating living facilities closer to your daughter.     Have the blood pressure checked for accuracy and for ease of use.     Follow-up with your PCP as scheduled.    Follow-up with Pulmonology on 4/10/24 as scheduled.     Follow-up with Dr. Paz or Tania CHAPMAN in 3 months  as scheduled or sooner if need.

## 2024-03-20 NOTE — CARE COORDINATION
Leland Sweeney is asking for orders for  PT and OT due to increased weakness, low energy, decreased ability to complete adl's and bathing, leg weakness.  Could you please place orders to [facility] for therapy including PT and OT.  Thank you!  
coordination team will reach out to notify the patient once the RPM kit is ordered.  [x]  Once the kit is delivered, the HRS team will contact the patient after [employer] delivers to assist with set up.  [x]  Determined BP cuff size: regular (9.05\"-15.74\")  [x]  Determined weight scale: regular (<330lbs)  [x]  Hours of ACM monitoring - Monday-Friday [id number]  [x]  It is important to take your vitals every day, even on the weekends, to keep your care team aware of how you are doing every day of the week.    All questions about RPM program answered at this time.    Ambulatory Care Coordination Assessment    Care Coordination Protocol  Week 1 - Initial Assessment     Do you have all of your prescriptions and are they filled?: Yes  Barriers to medication adherence: None  Are you able to afford your medications?: Yes  How often do you have trouble taking your medications the way you have been told to take them?: I always take them as prescribed.     Do you have Home O2 Therapy?: Yes   Oxygen Regimen: Continuous    Method of Delivery: Nasal Cannula      Ability to seek help/take action for Emergent Urgent situations i.e. fire, crime, inclement weather or health crisis.: Independent  Ability to ambulate to restroom: Independent  Ability handle personal hygeine needs (bathing/dressing/grooming): Independent  Ability to manage Medications: Independent  Ability to prepare Food Preparation: Independent  Ability to maintain home (clean home, laundry): Independent  Ability to drive and/or has transportation: Independent  Ability to do shopping: Independent  Ability to manage finances: Independent  Is patient able to live independently?: Yes     Current Housing: Private Residence     Patient Home Equipment: Oxygen           Are you experiencing loss of meaning?: No  Are you experiencing loss of hope and peace?: No     Suggested Interventions and Community Resources                  Future Appointments   Date Time Provider Department

## 2024-03-21 ENCOUNTER — TELEPHONE (OUTPATIENT)
Dept: FAMILY MEDICINE CLINIC | Age: 89
End: 2024-03-21

## 2024-03-21 NOTE — TELEPHONE ENCOUNTER
Call received from Lucila at [facility]. States that the order and recent office note will need updated before they can move forward with this.  Cannot use osteoporosis as diagnosis unless it is mentioned in the note. If using fatigue, will need to incorporate another diagnosis, for example, her chronic respiratory issues. Would also need to mention this in the office note.  PT/OT will check pt's bp prior to their sessions but if you want vital signs assessed under skilled nursing, will need to use her htn and blood pressure issues as part of the diagnosis also. This will also need addressed in note as the order for services and office notes have to match.  Once order updated, call Lucila back at [id number].

## 2024-03-25 ENCOUNTER — TELEPHONE (OUTPATIENT)
Dept: FAMILY MEDICINE CLINIC | Age: 89
End: 2024-03-25

## 2024-03-25 NOTE — TELEPHONE ENCOUNTER
----- Message from Alyssa German sent at 3/25/2024  3:42 PM EDT -----  Subject: Message to Provider    QUESTIONS  Information for Provider? Patient received the box of equipment that was   sent to her and needs a call back on this. scale bp etc  ---------------------------------------------------------------------------  --------------  CALL BACK INFO  [phone]; Do not leave any message, patient will call back for answer  ---------------------------------------------------------------------------  --------------  SCRIPT ANSWERS  Relationship to Patient? Self

## 2024-03-26 ENCOUNTER — CARE COORDINATION (OUTPATIENT)
Dept: CARE COORDINATION | Age: 89
End: 2024-03-26

## 2024-03-26 NOTE — CARE COORDINATION
Ambulatory Care Coordination Note  3/26/2024    Patient Current Location:  Home: [address] [place], [place] [id number]     ACM contacted the family by telephone. Verified name and  with family as identifiers. Provided introduction to self, and explanation of the ACM role.     Challenges to be reviewed by the provider   Additional needs identified to be addressed with provider: No  none               Method of communication with provider: none.    ACM: Ynes Richardson, RN    Spoke with daughterSilva  Discussed activation of RPM equipment and daughter states they will work on this  States they contacted [employer] and will start with private pay meals  [facility] is also working to get involved after referral placed and needed information received from PCP office  Silva states she will assist with getting RPM equipment set up  Will complete Welcome call information once activated    Plan  Ensure RPM equipment activated  Ensure [facility] able to see patient for services  Reinforce importance of early symptom recognition and reporting to prevent exacerbation and unnecessary hospitalization  Congestive Heart Failure Assessment    Do you understand a low sodium diet?: Yes  Do you understand how to read food labels?: Yes  How many restaurant meals do you eat per week?: 3-4  Do you salt your food before tasting it?: No         Symptoms:  CHF associated angina: Neg, CHF associated dyspnea on exertion: Pos, CHF associated fatigue: Neg, CHF associated orthostatic hypotension: Neg, CHF associated PND: Neg, CHF associated shortness of breath: Neg, CHF associated weakness: Neg      Symptom course: stable               Offered patient enrollment in the Remote Patient Monitoring (RPM) program for in-home monitoring:  pre activated .    Lab Results       None                 Goals Addressed                      This Visit's Progress      Conditions and Symptoms (pt-stated)   On track      I will schedule office visits, as directed by my 
Left message to return phone call to complete Care Coordination follow up.  Also following up on RPM equipment received to help with activation.  
vag bleed

## 2024-03-27 ENCOUNTER — CARE COORDINATION (OUTPATIENT)
Dept: CASE MANAGEMENT | Age: 89
End: 2024-03-27

## 2024-03-27 NOTE — CARE COORDINATION
Remote Alert Monitoring Note  Rpm alert to be reviewed by the provider   red alert   pulse ox reading (88)   Additional needs to be addressed by N/A: No                    Date/Time:  3/27/2024 1:51 PM  Patient Current Location: Home: [address] [place], [place] [id number]  LPN contacted patient by telephone. Verified patients name and  as identifiers.  Background: CHF, HTN  Refer to 911 immediately if:  Patient unresponsive or unable to provide history  Change in cognition or sudden confusion  Patient unable to respond in complete sentences  Intense chest pain/tightness  Any concern for any clinical emergency  Red Alert: Provider response time of 1 hr required for any red alert requiring intervention  Yellow Alert: Provider response time of 3hr required for any escalated yellow alert    O2 Triage  Are you having any Chest Pain? no   Are you having any Shortness of Breath? no   Swelling in your hands or feet? no     Are you having any other health concerns or issues? no      Clinical Interventions: Reviewed and followed up on alerts and treatments-Spoke to patient she is speaking in full clear sentences denies chest pain, SOB, dizziness rechecked pulse ox and whoever was with her in home did not bring tablet to room so reading did not register he told me the reading was 96/80 reminded patient to have equipment near tablet and tablet turned on she has no concerns    Plan/Follow Up: Will continue to review, monitor and address alerts with follow up based on severity of symptoms and risk factors.

## 2024-04-01 ENCOUNTER — CARE COORDINATION (OUTPATIENT)
Dept: CARE COORDINATION | Facility: CLINIC | Age: 89
End: 2024-04-01

## 2024-04-01 NOTE — CARE COORDINATION
Remote Patient Monitoring Note      Date/Time:  2024 3:25 PM  Patient Current Location: [place]  Incoming call from pt received. Verified patients name and  as identifiers.    Background: Pt enrolled in RPM r/t CHF and HTN.   Clinical Interventions: Reviewed and followed up on alerts and treatments-Pt speaking in full sentences, continues to deny acute distress, reports she has finished breathing treatment, is wearing home O2 @ 2L, and is about to recheck pulse ox. Updated reading of 92% reported. SpO2 of 91% and 90% transmitted. Pt reports she was active / walking to and from rooms to get RPM equipment prior to rechecking pulse ox but did not increase home O2 to 4L. Pt now at rest. Recheck of 96% noted in HRS and is within RPM parameters. Home O2 order reviewed with pt. Pt encouraged to use home O2 @ 4lpm to ambulate (per order). Pt verbalizes understanding.   Plan/Follow Up: Will continue to review, monitor and address alerts with follow up based on severity of symptoms and risk factors.

## 2024-04-01 NOTE — CARE COORDINATION
Remote Patient Monitoring Note      Date/Time:  2024 2:33 PM  Patient Current Location: [facility] contacted patient by telephone regarding yellow alert received for no BP taken > 2 days. BP and pulse ox readings have not been updated x 5 days. Verified patients name and  as identifiers.    Background: Pt enrolled in RPM r/t CHF and HTN.   Clinical Interventions: Discussed RPM adherence and need to complete daily metrics with pt. Pt reports compliance with daily metrics. Transmission / connection issue noted. Pt unable to recall BP and pulse ox readings obtained this AM, is agreeable to check at this time, and be contacted by [facility] tech support if needed to reestablish connection. Updated readings reported, added to HRS, and are with RPM parameters. Pt verbalizes understanding of when to notify provider(s) of changes / concerns and when to seek emergent medical care. HRS tech support notified of transmission / connection issue.     Plan/Follow Up: Will continue to review, monitor and address alerts with follow up based on severity of symptoms and risk factors.

## 2024-04-01 NOTE — CARE COORDINATION
Remote Alert Monitoring Note  Rpm alert to be reviewed by the provider   red alert   pulse ox reading (91%)   Additional needs to be addressed by N/A: No                    Date/Time:  2024 3:02 PM  Patient Current Location: [facility] contacted patient by telephone. Verified patients name and  as identifiers.  Background: Pt enrolled in RPM r/t CHF and HTN.   Refer to 911 immediately if:  Patient unresponsive or unable to provide history  Change in cognition or sudden confusion  Patient unable to respond in complete sentences  Intense chest pain/tightness  Any concern for any clinical emergency  Red Alert: Provider response time of 1 hr required for any red alert requiring intervention  Yellow Alert: Provider response time of 3hr required for any escalated yellow alert    O2 Triage  Are you having any Chest Pain? no   Are you having any Shortness of Breath? no   Swelling in your hands or feet? no     Are you having any other health concerns or issues? no      Clinical Interventions: Reviewed and followed up on alerts and treatments-Pt speaking in full sentences, denies CP, SOB, and swelling in hands or feet at this time. Pt was not wearing home O2 @ 2L when pulse ox reading of 91% was recorded. Pt agreeable to place home O2 back on, states she is going to use scheduled Duoneb treatment, and recheck pulse ox when completed. Pt verbalizes understanding of RPM monitoring hours, when to notify provider(s) of changes / concerns, and when to seek emergent medical care.   Plan/Follow Up: Will continue to review, monitor and address alerts with follow up based on severity of symptoms and risk factors.

## 2024-04-02 ENCOUNTER — CARE COORDINATION (OUTPATIENT)
Dept: CARE COORDINATION | Age: 89
End: 2024-04-02

## 2024-04-02 NOTE — CARE COORDINATION
Remote Alert Monitoring Note      Date/Time:  2024 1:07 PM  Patient Current Location: Home: [address] [place] [address]    LPN contacted patient by telephone regarding red alert received for pulse ox reading (87%). Verified patients name and  as identifiers.    Rpm alert to be reviewed by the provider                         Background: CHF, High Blood-Pressure     Refer to 911 immediately if:  Patient unresponsive or unable to provide history  Change in cognition or sudden confusion  Patient unable to respond in complete sentences  Intense chest pain/tightness  Any concern for any clinical emergency  Red Alert: Provider response time of 1 hr required for any red alert requiring intervention  Yellow Alert: Provider response time of 3hr required for any escalated yellow alert    O2 Triage  Are you having any Chest Pain? no   Are you having any Shortness of Breath? no   Swelling in your hands or feet? no     Are you having any other health concerns or issues? no       Have you taken your medications as instructed by your doctor today? Yes       Clinical Interventions: Reviewed and followed up on alerts and treatments-. Pt is asymptomatic and in no apparent distress, speaking in full sentences.  Patient was able to recheck while on the call and got a normal reading of 92%.  Patient reminded to make sure hands are warm and allow time to relax prior to checking.  She voiced understanding.      Plan/Follow Up: Will continue to review, monitor and address alerts with follow up based on severity of symptoms and risk factors.    KATINA Freeman [facility]/ CTN/ Remote Patient monitoring  [phone]

## 2024-04-02 NOTE — CARE COORDINATION
Ambulatory Care Coordination Note  2024    Patient Current Location:  Home: [address] [place], [place] [id number]     ACM contacted the patient by telephone. Verified name and  with patient as identifiers. Provided introduction to self, and explanation of the ACM role.     Challenges to be reviewed by the provider   Additional needs identified to be addressed with provider: No  none               Method of communication with provider: none.    ACM: Ynes Richardson, RN    Spoke with Leland for continued Care Coordination followup  States she is doing well for review  Discussed RPM monitoring and welcome call completed  She is currently not entering metrics daily, ACM encouraged her to enter daily metrics for better monitoring.  States she will enter metrics in about 30 mins  States she is now getting [employer] and is very happy with them  Discussed HH, states she has not heard from them yet  AC called [facility] to see why not working with her and left message to return phone call  Clarification notes were sent from PCP office which were needed from [facility] but will further investigate if she qualifies for their services    Plan  Encourage continued RPM participation  Work with [facility] to see if she qualifies for their support  Welcome call completed  Reinforce importance of early symptom recognition and reporting to prevent exacerbation and unnecessary hospitalization  Congestive Heart Failure Assessment    Do you understand a low sodium diet?: Yes  Do you understand how to read food labels?: Yes  How many restaurant meals do you eat per week?: 3-4  Do you salt your food before tasting it?: No         Symptoms:  CHF associated angina: Neg, CHF associated dyspnea on exertion: Pos, CHF associated fatigue: Neg, CHF associated leg swelling: Neg, CHF associated orthostatic hypotension: Neg, CHF associated PND: Neg, CHF associated shortness of breath: Neg, CHF associated weakness: Neg      Symptom course:

## 2024-04-03 ENCOUNTER — CARE COORDINATION (OUTPATIENT)
Dept: CARE COORDINATION | Age: 89
End: 2024-04-03

## 2024-04-03 NOTE — CARE COORDINATION
Zahra       Mari's daughter Silva called and would like PCP opinion.  Silva states there is a family wedding in a few weeks in [place] that is about 4 and 1/2 hours away one way.  She is wanting to know if PCP feels that Mari could physically and medically handle this?  She would be driving 4 and 1/2 hours one way, attending the wedding and reception and then staying the next day for a brunch before heading back another 4 and 1/2 hours home.  Please advise.  Thank you!

## 2024-04-03 NOTE — TELEPHONE ENCOUNTER
It is acceptable for her to attend the wedding. She will require frequent stops to get out of the car and walk around. Ensure she takes all of her medicatiosn with her to avoid any missed doses. She can also discuss with her Cardiologist.

## 2024-04-03 NOTE — CARE COORDINATION
ANSHU received a  call from Katie at [facility].  Asked Katie if they were able to follow up on referral placed by PCP office in March.  More information was asked at that time from Lucila at [facility] and the PCP office faxed over more information.  Katie states she is going to look into this and let me know what she finds out.

## 2024-04-03 NOTE — CARE COORDINATION
Remote Alert Monitoring Note      Date/Time:  4/3/2024 10:39 AM  Patient Current Location: Home: [address] [place] [address]    LPN contacted patient by telephone regarding red alert received for pulse ox reading (86%). Verified patients name and  as identifiers.    Rpm alert to be reviewed by the provider                         Background: CHF, High Blood-Pressure     Refer to 911 immediately if:  Patient unresponsive or unable to provide history  Change in cognition or sudden confusion  Patient unable to respond in complete sentences  Intense chest pain/tightness  Any concern for any clinical emergency  Red Alert: Provider response time of 1 hr required for any red alert requiring intervention  Yellow Alert: Provider response time of 3hr required for any escalated yellow alert    O2 Triage  Are you having any Chest Pain? no   Are you having any Shortness of Breath? no   Swelling in your hands or feet? no     Are you having any other health concerns or issues? no           Clinical Interventions: Reviewed and followed up on alerts and treatments-. Pt is in no apparent distress, speaking in full sentences.  Patient was able to recheck while on the call and got a reading of 83%.  Patient states se has her 02 on. Writer advised checking to make sure it is coming out of canula.  Patient did verify that 02 was flowing but noted that her 02 was only set at 2lpm.  Patient too use 4lpm while up and about.  She was able to turn up her 02 and was agreeable to sit and relax for a few minutes along with ensuring her hands are warm prior to rechecking.  Will await update.      Plan/Follow Up: Will continue to review, monitor and address alerts with follow up based on severity of symptoms and risk factors.    Jayna Montes LPN  [facility]/ CTN/ Remote Patient monitoring  [phone]

## 2024-04-03 NOTE — CARE COORDINATION
SW mailed pt daughter Silva Greco @ [address] [place], [place] [address], the available AL facilities in [facility]. Requested by Chiara Richardson.

## 2024-04-04 ENCOUNTER — OFFICE VISIT (OUTPATIENT)
Dept: FAMILY MEDICINE CLINIC | Age: 89
End: 2024-04-04
Payer: MEDICARE

## 2024-04-04 ENCOUNTER — CARE COORDINATION (OUTPATIENT)
Dept: CARE COORDINATION | Age: 89
End: 2024-04-04

## 2024-04-04 VITALS
SYSTOLIC BLOOD PRESSURE: 130 MMHG | DIASTOLIC BLOOD PRESSURE: 80 MMHG | HEART RATE: 80 BPM | TEMPERATURE: 97.7 F | WEIGHT: 143.6 LBS | RESPIRATION RATE: 16 BRPM | OXYGEN SATURATION: 85 % | BODY MASS INDEX: 27.13 KG/M2

## 2024-04-04 DIAGNOSIS — M81.0 OSTEOPOROSIS, SENILE: ICD-10-CM

## 2024-04-04 DIAGNOSIS — R53.83 FATIGUE, UNSPECIFIED TYPE: ICD-10-CM

## 2024-04-04 DIAGNOSIS — E11.69 TYPE 2 DIABETES MELLITUS WITH OTHER SPECIFIED COMPLICATION, WITHOUT LONG-TERM CURRENT USE OF INSULIN (HCC): Primary | ICD-10-CM

## 2024-04-04 DIAGNOSIS — R53.1 WEAKNESS: ICD-10-CM

## 2024-04-04 PROCEDURE — 99214 OFFICE O/P EST MOD 30 MIN: CPT | Performed by: NURSE PRACTITIONER

## 2024-04-04 PROCEDURE — 1123F ACP DISCUSS/DSCN MKR DOCD: CPT | Performed by: NURSE PRACTITIONER

## 2024-04-04 NOTE — CARE COORDINATION
04/04/24 11:40 AM Patient is currently enrolled in Remote Patient Monitoring for CHF and HTN.  Pt has a scheduled office visit today at 2:40 PM.  RPM metrics added for review.

## 2024-04-04 NOTE — PROGRESS NOTES
ECHOCARDIOGRAM  01/28/2005    EF 70%    UPPER GASTROINTESTINAL ENDOSCOPY  03/21/2017     Family History   Problem Relation Age of Onset    Heart Disease Mother     High Cholesterol Mother     Stroke Mother     Diabetes Mother     Heart Disease Father     High Blood Pressure Father     Pacemaker Father     Ovarian Cancer Daughter 44    Breast Cancer Maternal Aunt 45    Breast Cancer Paternal Aunt 55     Social History     Tobacco Use    Smoking status: Never    Smokeless tobacco: Never    Tobacco comments:     Never smoker   Substance Use Topics    Alcohol use: Not Currently     Comment: RARE      Current Outpatient Medications   Medication Sig Dispense Refill    Vitamin D3 125 MCG (5000 UT) TABS tablet Take 1 tablet by mouth daily      metoprolol succinate (TOPROL XL) 25 MG extended release tablet Take 1 tablet by mouth daily 90 tablet 3    levocetirizine (XYZAL) 5 MG tablet Take 1 tablet by mouth nightly 90 tablet 3    pantoprazole (PROTONIX) 40 MG tablet Take 1 tablet by mouth daily 14 tablet 0    denosumab (PROLIA) 60 MG/ML SOSY SC injection Inject 1 mL into the skin every 6 months 1 mL 1    atorvastatin (LIPITOR) 40 MG tablet Take 1 tablet by mouth every evening 90 tablet 3    Folic Acid (FOLATE PO) daily      CRANBERRY PO Cran D-Mannose 2 capsules daily      ipratropium-albuterol (DUONEB) 0.5-2.5 (3) MG/3ML SOLN nebulizer solution Inhale 3 mLs into the lungs 3 times daily USE 1 VIAL IN NEBULIZER EVERY 6 HOURS - for shortness of breath/wheezing 270 mL 11    Probiotic Product (ALIGN PO) Take by mouth daily      Omega-3 Fatty Acids (FISH OIL PO) Take by mouth daily      OXYGEN 2 lpm at rest and to sleep and 4 lpm to ambulate (Patient taking differently: 3 lpm at rest and to sleep and 4 lpm to ambulate) 1 Device 6    therapeutic multivitamin-minerals (THERAGRAN-M) tablet Take 1 tablet by mouth daily      Calcium Citrate-Vitamin D 200-200 MG-UNIT TABS Take 1 tablet by mouth 2 times daily       Coenzyme Q10 (COQ10)

## 2024-04-04 NOTE — TELEPHONE ENCOUNTER
Dariusz from PCP office called ACM.  States Ivanna called office today asking for therapy orders due to fall hx and increased weakness with decreased mobility.  Dariusz states she will get orders and start process for therapy.

## 2024-04-05 ENCOUNTER — CARE COORDINATION (OUTPATIENT)
Dept: CARE COORDINATION | Age: 89
End: 2024-04-05

## 2024-04-05 VITALS
HEART RATE: 73 BPM | BODY MASS INDEX: 28.61 KG/M2 | SYSTOLIC BLOOD PRESSURE: 140 MMHG | DIASTOLIC BLOOD PRESSURE: 73 MMHG | WEIGHT: 151.4 LBS | OXYGEN SATURATION: 94 %

## 2024-04-05 NOTE — CARE COORDINATION
Date/Time:  4/5/2024 11:31 AM    Update: Return call received from patient. She states her Sp02 is now up to 94%, but doesn't think it registered.  Will update in HRS.

## 2024-04-05 NOTE — CARE COORDINATION
Remote Alert Monitoring Note      Date/Time:  2024 11:19 AM  Patient Current Location: Home: [address] [place] [address]    LPN contacted patient by telephone regarding red alert received for pulse ox reading (87%). Verified patients name and  as identifiers.    Rpm alert to be reviewed by the provider                         Background:  CHF, High Blood-Pressure     Refer to 911 immediately if:  Patient unresponsive or unable to provide history  Change in cognition or sudden confusion  Patient unable to respond in complete sentences  Intense chest pain/tightness  Any concern for any clinical emergency  Red Alert: Provider response time of 1 hr required for any red alert requiring intervention  Yellow Alert: Provider response time of 3hr required for any escalated yellow alert    O2 Triage  Are you having any Chest Pain? no   Are you having any Shortness of Breath? no   Swelling in your hands or feet? no     Are you having any other health concerns or issues? no       Have you taken your medications as instructed by your doctor today? Yes       Clinical Interventions: Reviewed and followed up on alerts and treatments-. Pt is asymptomatic and in no apparent distress, speaking in full sentences.  Patient sates she is doing fine and is agreeable to sit and relax and ensure hands are warm prior to rechecking.  Will await update.      Plan/Follow Up: Will continue to review, monitor and address alerts with follow up based on severity of symptoms and risk factors.    KATINA Freeman [facility]/ CTN/ Remote Patient monitoring  [phone]

## 2024-04-05 NOTE — CARE COORDINATION
Remote Patient Monitoring Note      Date/Time:  4/5/2024 10:45 AM    LPN attempted to contact patient by telephone regarding red alert received for pulse ox reading (87%).     Background: CHF, High Blood-Pressure     Clinical Interventions:   HIPAA compliant message left on  requesting a return call.    Plan/Follow Up: Will continue to review, monitor and address alerts with follow up based on severity of symptoms and risk factors.       KATINA Freeman [facility]/ CTN/ Remote Patient Monitoring  [phone]

## 2024-04-08 ENCOUNTER — TELEPHONE (OUTPATIENT)
Dept: PULMONOLOGY | Age: 89
End: 2024-04-08

## 2024-04-08 ASSESSMENT — ENCOUNTER SYMPTOMS
EYE DISCHARGE: 0
NAUSEA: 0
EYE REDNESS: 0
ABDOMINAL PAIN: 0
SORE THROAT: 0
COLOR CHANGE: 0
BLOOD IN STOOL: 0
ANAL BLEEDING: 0
RHINORRHEA: 0
DIARRHEA: 0
CONSTIPATION: 0
ABDOMINAL DISTENTION: 0
COUGH: 0
SHORTNESS OF BREATH: 0

## 2024-04-08 NOTE — TELEPHONE ENCOUNTER
Patient called and stated that someone made her appt in [place] and she does not drive and will be unable to make it... she asked if she could be seen 4/25 due to her daughter being  in town and can bring her... I moved her to that day (Sleep day) She is Pulmonary that day,is this okay??

## 2024-04-09 ENCOUNTER — CARE COORDINATION (OUTPATIENT)
Dept: CARE COORDINATION | Age: 89
End: 2024-04-09

## 2024-04-09 NOTE — TELEPHONE ENCOUNTER
She can see another NP that day for pulmonary.  She is a 1 yr with no testing, so is appropriate to go to another NP.  My schedules are getting to messed up with putting pulm on sleep days.

## 2024-04-09 NOTE — CARE COORDINATION
Remote Alert Monitoring Note      Date/Time:  2024 11:42 AM  Patient Current Location: Home: [address] [place] [address]    LPN contacted patient by telephone regarding red alert received for pulse ox reading (90%). Verified patients name and  as identifiers.    Rpm alert to be reviewed by the provider                         Background: CHF, High Blood-Pressure     Refer to 911 immediately if:  Patient unresponsive or unable to provide history  Change in cognition or sudden confusion  Patient unable to respond in complete sentences  Intense chest pain/tightness  Any concern for any clinical emergency  Red Alert: Provider response time of 1 hr required for any red alert requiring intervention  Yellow Alert: Provider response time of 3hr required for any escalated yellow alert    O2 Triage  Are you having any Chest Pain? no   Are you having any Shortness of Breath? no   Swelling in your hands or feet? no     Are you having any other health concerns or issues? no           Clinical Interventions: Reviewed and followed up on alerts and treatments-. Pt is asymptomatic and in no apparent distress, speaking in full sentences.  Patient states she is feeling fine. Patient advised to check the concentrator to ensure it was set on 4lpm since she is up and moving around.  Patient states she forgot to adjust it and will do so now.  Patient encouraged to sit and relax for a few minutes before rechecking her Sp02.  She was agreeable, will await update.      Plan/Follow Up: Will continue to review, monitor and address alerts with follow up based on severity of symptoms and risk factors.    KATINA Freeman [facility]/ CTN/ Remote Patient monitoring  [phone]

## 2024-04-09 NOTE — CARE COORDINATION
Left message to return phone call to complete Care Coordination followup.  Left message regarding red alert for pulse ox and asked if she could recheck that reading to see if it's WNL.  Encouraged her to call back.

## 2024-04-09 NOTE — CARE COORDINATION
Remote Patient Monitoring Note      Date/Time:  4/9/2024 11:10 AM    LPN attempted to contact patient by telephone regarding red alert received for pulse ox reading (90%).     Background:  CHF, High Blood-Pressure     Clinical Interventions:HIPAA compliant message left on  requesting a return call.    Plan/Follow Up: Will continue to review, monitor and address alerts with follow up based on severity of symptoms and risk factors.       KATINA Freeman [facility]/ CTN/ Remote Patient Monitoring  [phone]

## 2024-04-11 ENCOUNTER — CARE COORDINATION (OUTPATIENT)
Dept: CASE MANAGEMENT | Age: 89
End: 2024-04-11

## 2024-04-11 NOTE — CARE COORDINATION
Remote Alert Monitoring Note  Rpm alert to be reviewed by the provider   red alert   pulse ox reading (91%)   Additional needs to be addressed by N/A: No                    Date/Time:  2024 11:16 AM  Patient Current Location: Home: [address] [place] [address]  LPN contacted patient by telephone. Verified patients name and  as identifiers.  Background: enrolled in RPM FOR CHF AND HTN  Refer to 911 immediately if:  Patient unresponsive or unable to provide history  Change in cognition or sudden confusion  Patient unable to respond in complete sentences  Intense chest pain/tightness  Any concern for any clinical emergency  Red Alert: Provider response time of 1 hr required for any red alert requiring intervention  Yellow Alert: Provider response time of 3hr required for any escalated yellow alert    O2 Triage  Are you having any Chest Pain? no   Are you having any Shortness of Breath? no   Swelling in your hands or feet? no     Are you having any other health concerns or issues? no      Clinical Interventions: Reviewed and followed up on alerts and treatments-spoke to Mari regarding RPM red alert for low pulse ox reading. Pt denies chest pain or dyspnea.  Speaks clearly in full sentences with no respiratory distress. Wears oxygen continuously   @ 2lpm. Pt increasing to 3lpm. She has not used her nebulizer today. Requested pt rub hands together briskly for 10 seconds and take a few deep breaths before rechecking pulse ox metrics. New reading 91%.  Pt reports her pulse ox reads 94%. . Entered metrics manually  Plan/Follow Up: Will continue to review, monitor and address alerts with follow up based on severity of symptoms and risk factors.

## 2024-04-12 ENCOUNTER — CARE COORDINATION (OUTPATIENT)
Dept: CARE COORDINATION | Age: 89
End: 2024-04-12

## 2024-04-12 NOTE — CARE COORDINATION
Remote Alert Monitoring Note      Date/Time:  2024 10:41 AM  Patient Current Location: Home: [address] [place] [address]    LPN contacted patient by telephone regarding red alert received for pulse ox reading (91%). Verified patients name and  as identifiers.    Rpm alert to be reviewed by the provider                         Background: CHF, High Blood-Pressure     Refer to 911 immediately if:  Patient unresponsive or unable to provide history  Change in cognition or sudden confusion  Patient unable to respond in complete sentences  Intense chest pain/tightness  Any concern for any clinical emergency  Red Alert: Provider response time of 1 hr required for any red alert requiring intervention  Yellow Alert: Provider response time of 3hr required for any escalated yellow alert    O2 Triage  Are you having any Chest Pain? no   Are you having any Shortness of Breath? no   Swelling in your hands or feet? no     Are you having any other health concerns or issues? no       Have you taken your medications as instructed by your doctor today? Yes       Clinical Interventions: Reviewed and followed up on alerts and treatments-. Pt is asymptomatic and in no apparent distress, speaking in full sentences.  Patient verified that 02 was set at 4lpm.  She was able to recheck while on the call and got a normal reading of 99%.  Will monitor.        Plan/Follow Up: Will continue to review, monitor and address alerts with follow up based on severity of symptoms and risk factors.    Jayna Montes LPN  [facility]/ CTN/ Remote Patient monitoring  [phone]

## 2024-04-16 ENCOUNTER — CARE COORDINATION (OUTPATIENT)
Dept: CARE COORDINATION | Age: 89
End: 2024-04-16

## 2024-04-16 NOTE — CARE COORDINATION
Ambulatory Care Coordination Note  2024    Patient Current Location:  Home: [address] [place] [address]     CCSS contacted the CCSS by telephone. Verified name and  with patient as identifiers. Provided introduction to self, and explanation of the CCSS role.     Challenges to be reviewed by the provider   Additional needs identified to be addressed with provider: No  none               Method of communication with provider: none.    I spoke with the patient for continued Care Coordination follow up and education. Patient states she is doing well no complaints at this time.  Has food delivered to her every Monday. States she did check her Pulse Ox, BP, Weight today w/ RPM.  Did not test BS.  States she needs to be better about doing that and will start checking tomorrow am and enter to RPM.  Confirmed f/u appts. W/ patient. I advised patient to contact PCP office if needed.  No further needs at this time.      Lab Results       None                 Goals Addressed    None         Future Appointments   Date Time Provider Department Center   2024  2:00 PM STR EXAM ROOM 3 COVID INFUSION STRZ OP NURS [facility]   2024  1:30 PM Zechariah Ly, PRISCILLA - CNP N Pulm Med [facility]   2024  2:00 PM Tania Gutierrez APRN - CNP N SRPX Heart [facility]   2024  2:00 PM Zahra English APRN - CNP Fam Medicine [facility]   2024  1:00 PM Jose Chaparro MD N SRPX Heart [facility]

## 2024-04-22 ENCOUNTER — HOSPITAL ENCOUNTER (OUTPATIENT)
Dept: NURSING | Age: 89
Discharge: HOME | End: 2024-04-22
Payer: MEDICARE

## 2024-04-22 VITALS
TEMPERATURE: 97 F | HEART RATE: 78 BPM | SYSTOLIC BLOOD PRESSURE: 118 MMHG | BODY MASS INDEX: 27.02 KG/M2 | OXYGEN SATURATION: 89 % | DIASTOLIC BLOOD PRESSURE: 57 MMHG | RESPIRATION RATE: 18 BRPM | WEIGHT: 143 LBS

## 2024-04-22 DIAGNOSIS — M81.0 OSTEOPOROSIS, SENILE: Primary | ICD-10-CM

## 2024-04-22 PROCEDURE — 6360000002 HC RX W HCPCS: Performed by: NURSE PRACTITIONER

## 2024-04-22 PROCEDURE — 96372 THER/PROPH/DIAG INJ SC/IM: CPT

## 2024-04-22 RX ORDER — SODIUM CHLORIDE 9 MG/ML
INJECTION, SOLUTION INTRAVENOUS CONTINUOUS
OUTPATIENT
Start: 2024-10-21

## 2024-04-22 RX ORDER — EPINEPHRINE 1 MG/ML
0.3 INJECTION, SOLUTION INTRAMUSCULAR; SUBCUTANEOUS PRN
OUTPATIENT
Start: 2024-10-21

## 2024-04-22 RX ORDER — DIPHENHYDRAMINE HYDROCHLORIDE 50 MG/ML
50 INJECTION INTRAMUSCULAR; INTRAVENOUS
OUTPATIENT
Start: 2024-10-21

## 2024-04-22 RX ADMIN — DENOSUMAB 60 MG: 60 INJECTION SUBCUTANEOUS at 14:07

## 2024-04-22 ASSESSMENT — PAIN - FUNCTIONAL ASSESSMENT: PAIN_FUNCTIONAL_ASSESSMENT: 0-10

## 2024-04-22 NOTE — PROGRESS NOTES
1400 pt admitted to opn per wheelchair for a prolia injection. Pt has had in the past and robb well. Stable.  PT RIGHTS AND RESPONSIBILITIES OFFERED TO PT._    1410  shot given   1415 pt robb well.  Stable.  Discharge instructions given to patient. Verbalize understanding of home going instructions. .   1420 discharge per wheelchair to home with family.               __m_ Safety:       (Environmental)  [place] to environment  Ensure ID band is correct and in place/ allergy band as needed  Assess for fall risk  Initiate fall precautions as applicable (fall band, side rails, etc.)  Call light within reach  Bed in low position/ wheels locked    __m__ Pain:       Assess pain level and characteristics  Administer analgesics as ordered  Assess effectiveness of pain management and report to MD as needed    __m__ Knowledge Deficit:  Assess baseline knowledge  Provide teaching at level of understanding  Provide teaching via preferred learning method  Evaluate teaching effectiveness    _m___ Hemodynamic/Respiratory Status:       (Pre and Post Procedure Monitoring)  Assess/Monitor vital signs and LOC  Assess Baseline SpO2 prior to any sedation  Obtain weight/height  Assess vital signs/ LOC until patient meets discharge criteria  Monitor procedure site and notify MD of any issues

## 2024-04-22 NOTE — DISCHARGE INSTRUCTIONS
Prolia injection discharge instructions:    Side effects: headache, joint pain, rash, swelling    Next Prolia injection on: October 23, Wednesday,   at 2 pm     This medication is given every 6 months. We will need a new order before we schedule your next one due around:     Please call [phone] with a any questions or concerns.

## 2024-04-23 ENCOUNTER — CARE COORDINATION (OUTPATIENT)
Dept: CARE COORDINATION | Age: 89
End: 2024-04-23

## 2024-04-23 ENCOUNTER — TELEPHONE (OUTPATIENT)
Dept: FAMILY MEDICINE CLINIC | Age: 89
End: 2024-04-23

## 2024-04-23 DIAGNOSIS — M05.10 RHEUMATOID LUNG (HCC): ICD-10-CM

## 2024-04-23 DIAGNOSIS — R53.1 WEAKNESS: Primary | ICD-10-CM

## 2024-04-23 DIAGNOSIS — R53.83 FATIGUE, UNSPECIFIED TYPE: ICD-10-CM

## 2024-04-23 DIAGNOSIS — J84.10 PULMONARY FIBROSIS (HCC): ICD-10-CM

## 2024-04-23 NOTE — TELEPHONE ENCOUNTER
Patient's daughter left message on nurse voicemail.  States that her mom received her Prolia injection yesterday and has been experiencing significant diarrhea since. Denies chills, nausea, or vomiting. She was unsure if this could be a reaction to the medicine or if she might have just picked up a stomach bug.    Please advise and call Silva back at [phone].

## 2024-04-23 NOTE — CARE COORDINATION
Ambulatory Care Coordination Note     2024 12:01 PM     Patient Current Location:  Home: [address] [place] [address]     ACM contacted the family by telephone. Verified name and  with family as identifiers.         ACM: Ynes Richardson RN     Challenges to be reviewed by the provider   Additional needs identified to be addressed with provider No  none               Method of communication with provider: none.    Care Summary Note: Received a call from daughter  Had some questions regarding HH  I had called [facility] before and they were trying to clarify the orders and any additional information needed  Daughter states they have not been in  I called and spoke with Delmi from [facility] and informed her I had talked to Katie at [facility] before about case  Delmi is going to speak with Katie for more details  Daughter also states that Leland received prolia  yesterday and is not feeling well today with diarrhea, states diarrhea has since stopped    Katie from [facility] states they will need new orders  Will ask PCP for this  Daughter states that she is not feeling well today and is not getting out of bed, will not be completing RPM     Plan  New orders for [facility]  Continue with RPM monitoring  Ensure feeling better          Offered patient enrollment in the Remote Patient Monitoring (RPM) program for in-home monitoring: Yes, patient already enrolled.     Assessments Completed:   General Assessment    Do you have any symptoms that are causing concern?: Yes  Progression since Onset: Gradually Improving  Reported Symptoms: Other (Comment: diarrhea)          Care Planning:    Goals Addressed    None          Advance Care Planning:   Not reviewed during this call     Medications Reviewed:   Completed during this call    PCP/Specialist follow up:   Future Appointments         Provider Specialty Dept Phone    2024 1:30 PM Zechariah Ly APRN - CNP Pulmonology [phone]    2024 2:00 PM Tania Gutierrez APRN

## 2024-04-23 NOTE — CARE COORDINATION
Zahra,      Could you please complete new orders for PT/OT for Leland.  The orders need to state the problem and side effects of the problem.  Ex, her rheumatoid arthritis along with her O2 consumption is causing increased fatigue and weakness and need for O2 conservation.  The new orders will need to be faxed to Lucila at [facility] at fax number [phone].  Thank you!

## 2024-04-24 ENCOUNTER — TELEPHONE (OUTPATIENT)
Dept: FAMILY MEDICINE CLINIC | Age: 89
End: 2024-04-24

## 2024-04-24 NOTE — TELEPHONE ENCOUNTER
Lucila from [facility] called regarding the referral for PT/OT that was placed by [facility] on 4/23/24. She would like [facility] to addend the addendum portion of the office note dated 4/4/24 with the diagnoses of rheumatoid lung and pulmonary fibrosis so that it will be covered by Medicare. She would like like it mentioned somewhere in the note that she is being referred to home health for PT/OT.     Lucila will check in Epic for the addendum.

## 2024-04-25 NOTE — TELEPHONE ENCOUNTER
Zahra English, APRN - CNP  Savanna Rodriguez, RN  Caller: Unspecified (2 days ago,  2:35 PM)  Diarrhea is very common side effect, 20-34% of people will experience this.  Ensure she is drinking lots of water/gatorade. If symptoms persist or worsen she needs to be seen. ER if unable to urinate or showing signs of dehydration.    Discussed with daughter. States that diarrhea has improved but pt is still not feeling well. She will continue to monitor and will call the office back in a few days to set up appt. Would like to have a discussion with provider about possible transition to higher level of care or assisted living.

## 2024-04-29 NOTE — PROGRESS NOTES
Patient Seen in: [facility]      History     Chief Complaint   Patient presents with    Headache     Stated Complaint: Head Injury  Subjective:   27-year-old healthy female presents 2 days status post a head injury.  She states she was walking out of the door and the spring-loaded latch broke off and hit the right side of her head and neck at a fast pace.  She has had a generalized headache since.  No wounds, erythema, or bruising.  No swelling to the area.  She has had a generalized headache.  She denies any change or loss of vision.  She states she did not lose consciousness, but felt dazed.  She has had intermittent episodes of dizziness and nausea.  No vomiting.  No history of head injury in the past.  No pain to the cervical spine.  She appears nontoxic.      Objective:   Past Medical History:    BMI 40.0-44.9, adult (HCC)    Hyperlipidemia    Low vitamin D level    OBESITY            Past Surgical History:   Procedure Laterality Date          Remove tonsils/adenoids,<13 y/o  07    [place] [facility] [place] [facility] [place]    Tonsillectomy      adenoids              Social History     Socioeconomic History    Marital status: Single   Tobacco Use    Smoking status: Never    Smokeless tobacco: Never   Vaping Use    Vaping status: Never Used   Substance and Sexual Activity    Alcohol use: No    Drug use: No    Sexual activity: Yes     Partners: Male            Review of Systems    Positive for stated complaint: Head Injury  Other systems are as noted in HPI.  Constitutional and vital signs reviewed.      All other systems reviewed and negative except as noted above.    Physical Exam     ED Triage Vitals [24 1138]   BP (!) 150/94   Pulse 105   Resp 18   Temp 97.5 °F (36.4 °C)   Temp src Temporal   SpO2 96 %   O2 Device None (Room air)     Current:BP (!) 150/94   Pulse 92   Temp 97.5 °F (36.4 °C) (Temporal)   Resp 18   SpO2 96%     Physical  Prescreening performed prior to testing. The following symptoms may indicate COVID-19 infection:        One of the following criteria:   Temperature taken, patient temperature was 97.9 F. Fever greater 100.0 F -no  New onset cough -  no  New onset shortness of breath -no  New onset difficulty breathing -no        And/or   Two or more of the following criteria:  New onset muscle aches -no  New onset headache -no  New onset sore throat -no  New onset loss of smell/taste -no  New onset diarrhea -no    Patient's screening was negative. PFT will be performed. Exam  Vitals and nursing note reviewed.   Constitutional:       General: She is not in acute distress.     Appearance: Normal appearance. She is not toxic-appearing.   HENT:      Head: Normocephalic.      Right Ear: Tympanic membrane normal.      Left Ear: Tympanic membrane normal.      Nose: Nose normal.      Mouth/Throat:      Mouth: Mucous membranes are moist.      Pharynx: Oropharynx is clear. No oropharyngeal exudate or posterior oropharyngeal erythema.   Eyes:      General:         Right eye: No discharge.         Left eye: No discharge.      Extraocular Movements: Extraocular movements intact.      Conjunctiva/sclera: Conjunctivae normal.      Pupils: Pupils are equal, round, and reactive to light.   Neck:      Comments: Some discomfort to right side of the neck with ROM. No point tenderness to the cervical spine. No swelling, erythema, bruising, or wounds visible.  Cardiovascular:      Rate and Rhythm: Normal rate and regular rhythm.   Pulmonary:      Effort: Pulmonary effort is normal.      Breath sounds: Normal breath sounds. No wheezing, rhonchi or rales.   Musculoskeletal:         General: Normal range of motion.      Cervical back: Normal range of motion and neck supple. Tenderness present.   Lymphadenopathy:      Cervical: No cervical adenopathy.   Skin:     General: Skin is warm and dry.      Capillary Refill: Capillary refill takes less than 2 seconds.      Findings: No bruising, erythema or rash.   Neurological:      General: No focal deficit present.      Mental Status: She is alert and oriented to person, place, and time.      Cranial Nerves: No cranial nerve deficit.      Sensory: No sensory deficit.      Motor: No weakness.      Coordination: Coordination normal.      Gait: Gait normal.   Psychiatric:         Mood and Affect: Mood normal.         Behavior: Behavior normal.         ED Course   CT BRAIN OR HEAD (35225)    Result Date: 4/29/2024  CONCLUSION:  1. Negative unenhanced CT brain.     Dictated by (CST): Collin Ortiz MD on 4/29/2024 at 1:18 PM     Finalized by (CST): Collin Ortiz MD on 4/29/2024 at 1:20 PM         Labs Reviewed - No data to display      MDM       Medical Decision Making  The CT of the head is negative, the patient is aware.  We discussed supportive care including Tylenol or Motrin as needed for pain and rest.  She will follow-up with her primary care doctor.    Amount and/or Complexity of Data Reviewed  Radiology: ordered.     Details: The CT of the brain is negative for any acute process.  Discussion of management or test interpretation with external provider(s): I discussed this patient with Dr. Curtis.  He agrees with the plan of care.    Risk  OTC drugs.  Risk Details: Intracranial hemorrhage versus concussion        Disposition and Plan     Clinical Impression:  1. Injury of head, initial encounter         Disposition:  Discharge  4/29/2024  1:39 pm    Follow-up:  Shavonne Pozo DO  [address] [facility] [address]  [place] [id number] [phone]    Call   to arrange follow up          Medications Prescribed:  Current Discharge Medication List

## 2024-04-30 ENCOUNTER — CARE COORDINATION (OUTPATIENT)
Dept: CARE COORDINATION | Age: 89
End: 2024-04-30

## 2024-04-30 NOTE — CARE COORDINATION
Ambulatory Care Coordination Note     2024 11:11 AM     Patient Current Location:  Home: [address] [place] [address]     ACM contacted the family by telephone. Verified name and  with family as identifiers.         ACM: Ynes Richardson RN     Challenges to be reviewed by the provider   Additional needs identified to be addressed with provider No  none               Method of communication with provider: none.    Care Summary Note: Spoke with daughter Silva states Leland is feeling better  States they are looking into AL around the [facility] since that is where daughter lives one including Olivier [address]  Continues with RPM monitoring  No alerts noted  No longer driving  States she is now active with [facility] therapy  States therapy has worked with her this week  States she is feeling better    Plan  Collaborate with [facility] as needed  Assist family with AL placement/paperwork as needed  Continue with RPM monitoring  Reinforce importance of early symptom recognition and reporting to prevent exacerbation and unnecessary hospitalization        Offered patient enrollment in the Remote Patient Monitoring (RPM) program for in-home monitoring: Yes, patient enrolled; current status is activated and monitoring.     Assessments Completed:   Congestive Heart Failure Assessment    Do you understand a low sodium diet?: Yes  Do you understand how to read food labels?: Yes  How many restaurant meals do you eat per week?: 3-4  Do you salt your food before tasting it?: No         Symptoms:  CHF associated angina: Neg, CHF associated dyspnea on exertion: Pos, CHF associated fatigue: Neg, CHF associated orthostatic hypotension: Neg, CHF associated PND: Neg, CHF associated shortness of breath: Neg, CHF associated weakness: Neg      Symptom course: stable          Care Planning:    Goals Addressed    None      Heart Failure Education outreach Date/Time: 2024 11:17 AM    Ambulatory Care Manager (ACM) contacted the

## 2024-05-03 ENCOUNTER — HOSPITAL ENCOUNTER (EMERGENCY)
Age: 89
Discharge: HOME | End: 2024-05-03
Attending: EMERGENCY MEDICINE
Payer: MEDICARE

## 2024-05-03 ENCOUNTER — APPOINTMENT (OUTPATIENT)
Dept: CT IMAGING | Age: 89
End: 2024-05-03
Payer: MEDICARE

## 2024-05-03 ENCOUNTER — APPOINTMENT (OUTPATIENT)
Dept: GENERAL RADIOLOGY | Age: 89
End: 2024-05-03
Payer: MEDICARE

## 2024-05-03 VITALS
SYSTOLIC BLOOD PRESSURE: 153 MMHG | TEMPERATURE: 97.8 F | HEIGHT: 61 IN | HEART RATE: 80 BPM | DIASTOLIC BLOOD PRESSURE: 67 MMHG | BODY MASS INDEX: 27 KG/M2 | RESPIRATION RATE: 18 BRPM | WEIGHT: 143 LBS | OXYGEN SATURATION: 90 %
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
## 2024-05-03 DIAGNOSIS — S01.81XA FACIAL LACERATION, INITIAL ENCOUNTER: ICD-10-CM

## 2024-05-03 LAB
ANION GAP SERPL CALC-SCNC: 7 MEQ/L (ref 8–16)
BASOPHILS ABSOLUTE: 0 THOU/MM3 (ref 0–0.1)
BASOPHILS NFR BLD AUTO: 0.6 %
BUN SERPL-MCNC: 15 MG/DL (ref 7–22)
CALCIUM SERPL-MCNC: 8.9 MG/DL (ref 8.5–10.5)
CHLORIDE SERPL-SCNC: 101 MEQ/L (ref 98–111)
CO2 SERPL-SCNC: 32 MEQ/L (ref 23–33)
CREAT SERPL-MCNC: 0.7 MG/DL (ref 0.4–1.2)
DEPRECATED RDW RBC AUTO: 45.7 FL (ref 35–45)
EKG ATRIAL RATE: 86 BPM
EKG P AXIS: 17 DEGREES
EKG P-R INTERVAL: 156 MS
EKG Q-T INTERVAL: 426 MS
EKG QRS DURATION: 136 MS
EKG QTC CALCULATION (BAZETT): 509 MS
EKG R AXIS: -66 DEGREES
EKG T AXIS: 39 DEGREES
EKG VENTRICULAR RATE: 86 BPM
EOSINOPHIL NFR BLD AUTO: 3.2 %
EOSINOPHILS ABSOLUTE: 0.2 THOU/MM3 (ref 0–0.4)
ERYTHROCYTE [DISTWIDTH] IN BLOOD BY AUTOMATED COUNT: 13.4 % (ref 11.5–14.5)
GFR SERPL CREATININE-BSD FRML MDRD: 83 ML/MIN/1.73M2
GLUCOSE SERPL-MCNC: 113 MG/DL (ref 70–108)
HCT VFR BLD AUTO: 45 % (ref 37–47)
HGB BLD-MCNC: 14.5 GM/DL (ref 12–16)
IMM GRANULOCYTES # BLD AUTO: 0.02 THOU/MM3 (ref 0–0.07)
IMM GRANULOCYTES NFR BLD AUTO: 0.3 %
INR PPP: 0.88 (ref 0.85–1.13)
LYMPHOCYTES ABSOLUTE: 1.7 THOU/MM3 (ref 1–4.8)
LYMPHOCYTES NFR BLD AUTO: 24.5 %
MAGNESIUM SERPL-MCNC: 2.1 MG/DL (ref 1.6–2.4)
MCH RBC QN AUTO: 29.9 PG (ref 26–33)
MCHC RBC AUTO-ENTMCNC: 32.2 GM/DL (ref 32.2–35.5)
MCV RBC AUTO: 92.8 FL (ref 81–99)
MONOCYTES ABSOLUTE: 0.6 THOU/MM3 (ref 0.4–1.3)
MONOCYTES NFR BLD AUTO: 8.6 %
NEUTROPHILS ABSOLUTE: 4.3 THOU/MM3 (ref 1.8–7.7)
NEUTROPHILS NFR BLD AUTO: 62.8 %
NRBC BLD AUTO-RTO: 0 /100 WBC
OSMOLALITY SERPL CALC.SUM OF ELEC: 281 MOSMOL/KG (ref 275–300)
PLATELET # BLD AUTO: 191 THOU/MM3 (ref 130–400)
PMV BLD AUTO: 10.5 FL (ref 9.4–12.4)
POTASSIUM SERPL-SCNC: 4.2 MEQ/L (ref 3.5–5.2)
RBC # BLD AUTO: 4.85 MILL/MM3 (ref 4.2–5.4)
REASON FOR REJECTION: NORMAL
REJECTED TEST: NORMAL
SODIUM SERPL-SCNC: 140 MEQ/L (ref 135–145)
WBC # BLD AUTO: 6.9 THOU/MM3 (ref 4.8–10.8)

## 2024-05-03 PROCEDURE — 71045 X-RAY EXAM CHEST 1 VIEW: CPT

## 2024-05-03 PROCEDURE — 12001 RPR S/N/AX/GEN/TRNK 2.5CM/<: CPT

## 2024-05-03 PROCEDURE — 85610 PROTHROMBIN TIME: CPT

## 2024-05-03 PROCEDURE — 99285 EMERGENCY DEPT VISIT HI MDM: CPT

## 2024-05-03 PROCEDURE — 93010 ELECTROCARDIOGRAM REPORT: CPT | Performed by: INTERNAL MEDICINE

## 2024-05-03 PROCEDURE — 70450 CT HEAD/BRAIN W/O DYE: CPT

## 2024-05-03 PROCEDURE — 36415 COLL VENOUS BLD VENIPUNCTURE: CPT

## 2024-05-03 PROCEDURE — 72170 X-RAY EXAM OF PELVIS: CPT

## 2024-05-03 PROCEDURE — 93005 ELECTROCARDIOGRAM TRACING: CPT | Performed by: EMERGENCY MEDICINE

## 2024-05-03 PROCEDURE — 12011 RPR F/E/E/N/L/M 2.5 CM/<: CPT

## 2024-05-03 PROCEDURE — 80048 BASIC METABOLIC PNL TOTAL CA: CPT

## 2024-05-03 PROCEDURE — 72125 CT NECK SPINE W/O DYE: CPT

## 2024-05-03 PROCEDURE — 85025 COMPLETE CBC W/AUTO DIFF WBC: CPT

## 2024-05-03 PROCEDURE — 83735 ASSAY OF MAGNESIUM: CPT

## 2024-05-03 ASSESSMENT — PAIN - FUNCTIONAL ASSESSMENT
PAIN_FUNCTIONAL_ASSESSMENT: 0-10
PAIN_FUNCTIONAL_ASSESSMENT: 0-10

## 2024-05-03 ASSESSMENT — PAIN SCALES - GENERAL
PAINLEVEL_OUTOF10: 2
PAINLEVEL_OUTOF10: 2

## 2024-05-03 NOTE — ED TRIAGE NOTES
Pt presents to ED subsequent to a fall at home. Pt reports going to her front door to collect her food delivery when she tripped over her nasal cannula tubing. She denies LOC follwing the fall or dizziness prior to the fall. There is a small laceration the the right side of her chin. There is a skin tear on the posterior portion of her left hand. Pt reports 2/10 pain on her chin. Denies pain to her head or neck.

## 2024-05-04 NOTE — DISCHARGE INSTRUCTIONS
Follow-up with your primary care physician for suture removal in 7 to 10 days    You have 4 sutures in the wound on your neck that need to be removed, the small wound on your chin has 2 absorbable sutures that do not need to be removed.    Keep laceration sites dry and do not submerge in water for the next 48 hours.    Return to the emergency department immediately if there is any new or concerning symptom.

## 2024-05-04 NOTE — ED PROVIDER NOTES
[facility] EMERGENCY DEPT  EMERGENCY DEPARTMENT ENCOUNTER          Pt Name: Mari Yan  MRN: [id number]  Birthdate 1935  Date of evaluation: 5/3/2024  Physician: Indio Villarreal MD  Supervising Attending Physician: Cheikh Alvarado DO       CHIEF COMPLAINT       Chief Complaint   Patient presents with    Fall         HISTORY OF PRESENT ILLNESS    HPI  Mari Yan is a 88 y.o. female who presents to the emergency department from home, brought in by EMS for evaluation of fall    Patient was walking towards her door and tripped on her oxygen and fell hitting her chin on her cart.  She reports neck pain and soreness she denies any weakness or numbness in any of her extremities.  She denied any headache but states that her chin is sore as well.  Patient did not lose consciousness she had a mechanical fall she was not dizzy.  She is on aspirin but does not take any other blood thinning medications.  The patient has no other acute complaints at this time.      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    Bronchiectasis ([facility]) 09/30/2015    CAD (coronary artery disease)     NON-OBSTRUCTIVE    Diabetes mellitus (HCC)     Heart palpitations     HX OF:    History of therapeutic radiation 1998    left breast cancer    Hyperlipidemia     Hypertension     ILD (interstitial lung disease) ([facility]) 09/30/2015    w/ Bronchectasis    Osteoarthritis     rhuematoid    Osteoporosis, senile 03/09/2023    Rheumatoid arthritis ([facility])     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 
assessment and plan     Cheikh Alvarado,   05/04/24 0043

## 2024-05-06 ENCOUNTER — OFFICE VISIT (OUTPATIENT)
Dept: PULMONOLOGY | Age: 89
End: 2024-05-06
Payer: MEDICARE

## 2024-05-06 ENCOUNTER — CARE COORDINATION (OUTPATIENT)
Dept: CARE COORDINATION | Age: 89
End: 2024-05-06

## 2024-05-06 VITALS
DIASTOLIC BLOOD PRESSURE: 74 MMHG | OXYGEN SATURATION: 90 % | SYSTOLIC BLOOD PRESSURE: 110 MMHG | TEMPERATURE: 97.7 F | HEIGHT: 61 IN | WEIGHT: 138 LBS | BODY MASS INDEX: 26.06 KG/M2 | HEART RATE: 77 BPM

## 2024-05-06 DIAGNOSIS — J84.10 PULMONARY FIBROSIS (HCC): ICD-10-CM

## 2024-05-06 DIAGNOSIS — J47.9 BRONCHIECTASIS WITHOUT COMPLICATION (HCC): ICD-10-CM

## 2024-05-06 DIAGNOSIS — J96.11 CHRONIC RESPIRATORY FAILURE WITH HYPOXIA (HCC): Primary | ICD-10-CM

## 2024-05-06 PROCEDURE — 99214 OFFICE O/P EST MOD 30 MIN: CPT

## 2024-05-06 PROCEDURE — 1123F ACP DISCUSS/DSCN MKR DOCD: CPT

## 2024-05-06 ASSESSMENT — ENCOUNTER SYMPTOMS
WHEEZING: 0
SHORTNESS OF BREATH: 0
RHINORRHEA: 0
COUGH: 0
SINUS PRESSURE: 0
SINUS PAIN: 0
CHEST TIGHTNESS: 0

## 2024-05-06 NOTE — PATIENT INSTRUCTIONS
I recommend a repeat pulmonary function test in about 1 year once you are with your new pulmonary provider.

## 2024-05-06 NOTE — PROGRESS NOTES
kg (138 lb)   SpO2 90% Comment: 2Lpm  BMI 26.07 kg/m²    Wt Readings from Last 3 Encounters:   05/06/24 62.6 kg (138 lb)   05/03/24 64.9 kg (143 lb)   04/22/24 64.9 kg (143 lb)       Physical Exam  Vitals reviewed.   Constitutional:       General: She is not in acute distress.     Comments: Scattered bruising r/t recent fall at home   HENT:      Head: Normocephalic and atraumatic.      Right Ear: External ear normal.      Left Ear: External ear normal.      Mouth/Throat:      Mouth: Mucous membranes are moist.      Pharynx: No oropharyngeal exudate or posterior oropharyngeal erythema.   Eyes:      General:         Right eye: No discharge.         Left eye: No discharge.   Cardiovascular:      Rate and Rhythm: Normal rate and regular rhythm.      Heart sounds: Normal heart sounds.   Pulmonary:      Effort: Pulmonary effort is normal. No respiratory distress.      Breath sounds: Normal breath sounds. No wheezing, rhonchi or rales.   Chest:      Chest wall: No tenderness.   Musculoskeletal:      Cervical back: Neck supple.      Right lower leg: No edema.      Left lower leg: No edema.      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   Neurological:      General: No focal deficit present.      Mental Status: She is alert.   Psychiatric:         Mood and Affect: Mood normal.         Behavior: Behavior normal.         Thought Content: Thought content normal.            Electronically signed by PRISCILLA Garduno CNP on 5/6/2024 at 2:24 PM     (Please note that portions of this note may have been completed with a voice recognition program. Efforts were made to edit the dictation but occasionally words are mis-transcribed)

## 2024-05-06 NOTE — CARE COORDINATION
Ambulatory Care Coordination  ED Follow up Call    Reason for ED visit:  fall with laceration on face   Status:     not changed    Did you call your PCP prior to going to the ED?  No      Did you receive a discharge instructions from the Emergency Room? Yes  Review of Instructions:     Understands what to report/when to return?:  Yes   Understands discharge instructions?:  Yes   Following discharge instructions?:  Yes   If not why?     Are there any new complaints of pain? No  New Pain Meds? No    Constipation prophylaxis needed?  No    If you have a wound is the dressing clean, dry, and intact? N/A  Understands wound care regimen? N/A    Are there any other complaints/concerns that you wish to tell your provider?   Spoke with Leland for ED follow up after fall at home, tripping over O2 tubing to get her MOW at the door.  States she has a small laceration by her chin.  Denies pain.  Discussed fall intervention and prevention to reduce risk for injury related to falls.  Confirmed follow up appt scheduled for stitches removal.  Informed her that RPM has not been completed for several days.  States she will get it completed today and attempt to complete daily.    FU appts/Provider:    Future Appointments   Date Time Provider Department Center   5/6/2024  1:30 PM Zechariah Ly APRN - CNP N Pulm Med [facility]   5/13/2024  1:20 PM Zahra English APRN - CNP [facility] Medicine [facility]   6/20/2024  2:00 PM Tania Gutierrez APRN - CNP N SRPX Heart [facility]   7/11/2024  2:00 PM Zahra English APRN - CNP [facility] Medicine [facility]   10/23/2024  2:00 PM STR EXAM ROOM 2 COVID INFUSION STRZ OP NURS [facility]   11/7/2024  1:00 PM Jose Chaparro MD N SRPX Heart [facility]           New Medications?:   No      Medication Reconciliation by phone - unable to do during call  Understands Medications?  Yes  Taking Medications? Yes  Can you swallow your pills?  Yes    Any further needs in the home i.e. Equipment?

## 2024-05-08 ENCOUNTER — CARE COORDINATION (OUTPATIENT)
Dept: CARE COORDINATION | Age: 89
End: 2024-05-08

## 2024-05-08 NOTE — CARE COORDINATION
Remote Patient Monitoring Note      Date/Time:  5/8/2024 11:15 AM    LPN attempted to contact patient by telephone regarding red alert received for pulse ox reading (89%).     Background: CHF, High Blood-Pressure     Clinical Interventions:  HIPAA compliant message left on  requesting a return call.    Plan/Follow Up: Will continue to review, monitor and address alerts with follow up based on severity of symptoms and risk factors.       KATINA Freeman [facility]/ CTN/ Remote Patient Monitoring  [phone]

## 2024-05-08 NOTE — CARE COORDINATION
Remote Patient Monitoring Note      Date/Time:  5/8/2024 11:27 AM    VM received from patient that she is feeling fine and not having any SOB.  She did recheck her SP02 and got a normal reading of 92%.  HRS updated.

## 2024-05-10 ENCOUNTER — CARE COORDINATION (OUTPATIENT)
Dept: CARE COORDINATION | Age: 89
End: 2024-05-10

## 2024-05-10 NOTE — CARE COORDINATION
Remote Alert Monitoring Note      Date/Time:  5/10/2024 11:22 AM  Patient Current Location: Home: [address] [place] [address]    LPN contacted patient by telephone regarding red alert received for blood pressure reading (128/112). Verified patients name and  as identifiers.    Rpm alert to be reviewed by the provider                         Background:  CHF, High Blood-Pressure        Refer to 911 immediately if:  Patient unresponsive or unable to provide history  Change in cognition or sudden confusion  Patient unable to respond in complete sentences  Intense chest pain/tightness  Any concern for any clinical emergency  Red Alert: Provider response time of 1 hr required for any red alert requiring intervention  Yellow Alert: Provider response time of 3hr required for any escalated yellow alert        BP Triage  Are you having any Chest Pain? no   Are you having any Shortness of Breath? no   Do you have a headache or have any vision changes? no   Are you having any numbness or tingling? no   Are you having any other health concerns or issues? no     Have you taken your medications as instructed by your doctor today? Yes       Clinical Interventions: Reviewed and followed up on alerts and treatments-. Pt is asymptomatic and in no apparent distress, speaking in full sentences.  Patient states she is feeling fine and was able to recheck her BP while on the call.  Updated /76, wnl.        Plan/Follow Up: Will continue to review, monitor and address alerts with follow up based on severity of symptoms and risk factors.    KATINA Freeman [facility]/ CTN/ Remote Patient monitoring  [phone]

## 2024-05-10 NOTE — CARE COORDINATION
Remote Patient Monitoring Note      Date/Time:  5/10/2024 10:29 AM    LPN attempted to contact patient by telephone regarding red alert received for blood pressure reading (128/112).     Background: CHF, High Blood-Pressure     Clinical Interventions:   HIPAA compliant message left on  requesting a return call.    Plan/Follow Up: Will continue to review, monitor and address alerts with follow up based on severity of symptoms and risk factors.       KATINA Freeman [facility]/ CTN/ Remote Patient Monitoring  [phone]

## 2024-05-13 ENCOUNTER — OFFICE VISIT (OUTPATIENT)
Dept: FAMILY MEDICINE CLINIC | Age: 89
End: 2024-05-13

## 2024-05-13 VITALS
BODY MASS INDEX: 25.79 KG/M2 | SYSTOLIC BLOOD PRESSURE: 126 MMHG | HEIGHT: 61 IN | RESPIRATION RATE: 18 BRPM | WEIGHT: 136.6 LBS | HEART RATE: 100 BPM | TEMPERATURE: 97.4 F | DIASTOLIC BLOOD PRESSURE: 60 MMHG

## 2024-05-13 DIAGNOSIS — Z51.89 VISIT FOR WOUND CHECK: ICD-10-CM

## 2024-05-13 DIAGNOSIS — Z11.1 VISIT FOR TB SKIN TEST: ICD-10-CM
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
## 2024-05-13 NOTE — PROGRESS NOTES
18   Temp: 97.4 °F (36.3 °C)   TempSrc: Oral   Weight: 62 kg (136 lb 9.6 oz)   Height: 1.549 m (5' 1\")       Body mass index is 25.81 kg/m².    Wt Readings from Last 3 Encounters:   05/13/24 62 kg (136 lb 9.6 oz)   05/06/24 62.6 kg (138 lb)   05/03/24 64.9 kg (143 lb)     BP Readings from Last 3 Encounters:   05/13/24 126/60   05/06/24 110/74   05/03/24 (!) 153/67       Physical Exam  Skin:               Lab Results   Component Value Date    WBC 6.9 05/03/2024    HGB 14.5 05/03/2024    HCT 45.0 05/03/2024     05/03/2024    CHOL 270 (H) 02/16/2024    TRIG 124 02/16/2024    HDL 93 02/16/2024     (H) 02/16/2024    AST 17 03/18/2024     05/03/2024    K 4.2 05/03/2024     05/03/2024    CREATININE 0.7 05/03/2024    BUN 15 05/03/2024    CO2 32 05/03/2024    TSH 1.520 03/18/2024    INR 0.88 05/03/2024    LABA1C 5.9 (H) 07/19/2023    LABGLOM 83 05/03/2024    MG 2.1 05/03/2024    CALCIUM 8.9 05/03/2024    VITD25 27 (L) 02/16/2024       Assessment & Plan    Diagnosis Orders   1. Visit for TB skin test  Quantiferon (R) TB Gold, (Incubated)      2. Visit for wound check        3. Fall, subsequent encounter            :   Sutures x 4 removed from neck - pt tolerated well.  Wound edges well-approximated.  No surrounding erythema or edema.  No drainage noted.   No steri-strips applied  No follow-ups on file.  Placed:  Orders Placed This Encounter   Procedures    Quantiferon (R) TB Gold, (Incubated)     Medications Prescribed:  No orders of the defined types were placed in this encounter.         Discussed use,benefit, and side effects of prescribed medications.  Barriers to medication complianceaddressed.  All patient questions answered.  Pt voiced understanding.     Electronicallysigned by PRISCILLA Spencer - CNP on 5/13/2024 at 3:45 PM

## 2024-05-16 LAB
QUANTI TB1 MINUS NIL: 0 IU/ML
QUANTI TB2 MINUS NIL: 0 IU/ML
QUANTIFERON MITOGEN MINUS NIL: 7.83 IU/ML
QUANTIFERON NIL: 0.04 IU/ML
QUANTIFERON TB GOLD PLUS: NEGATIVE

## 2024-05-20 RX ORDER — METOPROLOL SUCCINATE 25 MG/1
25 TABLET, EXTENDED RELEASE ORAL DAILY
Qty: 90 TABLET | Refills: 3 | Status: SHIPPED | OUTPATIENT
Start: 2024-05-20

## 2024-05-21 ENCOUNTER — PATIENT MESSAGE (OUTPATIENT)
Dept: PULMONOLOGY | Age: 89
End: 2024-05-21

## 2024-05-21 ENCOUNTER — PATIENT MESSAGE (OUTPATIENT)
Dept: FAMILY MEDICINE CLINIC | Age: 89
End: 2024-05-21

## 2024-05-21 ENCOUNTER — TELEPHONE (OUTPATIENT)
Dept: CARDIOLOGY CLINIC | Age: 89
End: 2024-05-21

## 2024-05-21 ENCOUNTER — CARE COORDINATION (OUTPATIENT)
Dept: CARE COORDINATION | Age: 89
End: 2024-05-21

## 2024-05-21 DIAGNOSIS — I25.10 CORONARY ARTERY DISEASE INVOLVING NATIVE CORONARY ARTERY OF NATIVE HEART WITHOUT ANGINA PECTORIS: Primary | ICD-10-CM

## 2024-05-21 DIAGNOSIS — E78.5 HYPERLIPIDEMIA, UNSPECIFIED HYPERLIPIDEMIA TYPE: ICD-10-CM

## 2024-05-21 DIAGNOSIS — I10 ESSENTIAL HYPERTENSION: ICD-10-CM

## 2024-05-21 DIAGNOSIS — J84.10 PULMONARY FIBROSIS (HCC): ICD-10-CM

## 2024-05-21 DIAGNOSIS — M05.10 RHEUMATOID LUNG (HCC): ICD-10-CM

## 2024-05-21 DIAGNOSIS — M06.9 RHEUMATOID ARTHRITIS, INVOLVING UNSPECIFIED SITE, UNSPECIFIED WHETHER RHEUMATOID FACTOR PRESENT (HCC): ICD-10-CM

## 2024-05-21 DIAGNOSIS — J96.11 CHRONIC RESPIRATORY FAILURE WITH HYPOXIA (HCC): Primary | ICD-10-CM

## 2024-05-21 DIAGNOSIS — M05.10 RHEUMATOID LUNG DISEASE WITH RHEUMATOID ARTHRITIS (HCC): Primary | ICD-10-CM

## 2024-05-21 DIAGNOSIS — M46.1 SACROILIITIS (HCC): ICD-10-CM

## 2024-05-21 DIAGNOSIS — I10 PRIMARY HYPERTENSION: Primary | ICD-10-CM

## 2024-05-21 DIAGNOSIS — J47.9 BRONCHIECTASIS WITHOUT COMPLICATION (HCC): ICD-10-CM

## 2024-05-21 DIAGNOSIS — I25.10 CORONARY ARTERY DISEASE INVOLVING NATIVE CORONARY ARTERY OF NATIVE HEART WITHOUT ANGINA PECTORIS: ICD-10-CM

## 2024-05-21 NOTE — CARE COORDINATION
RPM team,      Could you please assist Leland with getting RPM equipment turned back in.  She will be leaving for [place], [place] to be placed in AL this weekend.  Thank you!    Remote Patient Monitoring Graduation      Date/Time:  5/21/2024 10:07 AM  Patient Current Location: Home: [address] [place] [address]  Patient has graduated from the Remote Patient Monitoring program on 5/21/2024.   RPM goals have been met at this time.      Patient has been provided instruction on process to return RPM equipment and RPM has been deactivated.     Patient has ACM's contact information for any further questions, concerns, or needs.  Time Spent: 5 minutes

## 2024-05-21 NOTE — CARE COORDINATION
Ambulatory Care Coordination Note     2024 10:04 AM     Patient Current Location:  Home: [address] [place] [address]     ACM contacted the patient by telephone. Verified name and  with patient as identifiers.         ACM: Ynes Richardson RN     Challenges to be reviewed by the provider   Additional needs identified to be addressed with provider No  none               Method of communication with provider: none.    Care Summary Note: Spoke with Leland for continued Care Coordination follow up  States she is getting ready to move to [place], [place] to an AL there close to her family  She will be admitted this weekend  She is currently active with RPM and would like to work on getting equipment turned back in  I will assist with this process and follow up to ensure all equipment turned back in and that AL placement was successful    Offered patient enrollment in the Remote Patient Monitoring (RPM) program for in-home monitoring: Yes, patient enrolled; current status is activated and monitoring.     Assessments Completed:   No changes since last call    Medications Reviewed:   Completed during this call    Advance Care Planning:   Not reviewed during this call     Care Planning:    Goals Addressed    None          PCP/Specialist follow up:   Future Appointments         Provider Specialty Dept Phone    2024 2:00 PM Tania Gutierrez, APRN - CNP Cardiology [phone]    2024 2:00 PM Zahra English, APRN - CNP Family Medicine [phone]    10/23/2024 2:00 PM STR EXAM ROOM 2 COVID INFUSION IP Unit [phone]    2024 1:00 PM Jose Chaparro MD Cardiology [phone]    2025 2:00 PM Zechariah Ly, APRN - CNP Pulmonology [phone]            Follow Up:   Plan for next ACM outreach in approximately 1 week to complete:  - RPM  - work towards graduation .   patient  is agreeable to this plan.

## 2024-05-21 NOTE — TELEPHONE ENCOUNTER
----- Message from Mari Yan sent at 5/21/2024 11:16 AM EDT -----  Regarding: Mari Yan  Contact: [phone]  I am moving to assisted living in [place]. Need to transfer or get order/referral to [facility] in [place].. Fax  number   Can you get this done for me?  Please have them call my daughter Silva Greco [phone] to set up appointment

## 2024-05-21 NOTE — TELEPHONE ENCOUNTER
From: Mari Yan  To: Zahra English  Sent: 5/21/2024 11:39 AM EDT  Subject: Mari Yan    Trying to set up a rheumatologist appointment in [place]. Can you send a referral to [facility] Fax [phone]? They need the HIPA signed by Mari stating that they can talk with Silva at [phone].    Dr. Davis stated the referral needs to come from you.    Thanks

## 2024-05-23 ENCOUNTER — TELEPHONE (OUTPATIENT)
Dept: FAMILY MEDICINE CLINIC | Age: 89
End: 2024-05-23

## 2024-05-23 DIAGNOSIS — M05.10 RHEUMATOID LUNG (HCC): Primary | ICD-10-CM

## 2024-05-23 NOTE — TELEPHONE ENCOUNTER
Regarding: FW: Mari Yuriy  Contact: [phone]        ----- Message -----  From: Kocher, Taylor, CMA ([facility])  Sent: 5/21/2024  12:05 PM EDT  To: PRISCILLA Spencer CNP  Subject: Mariinna Yan                                     ----- Message from Taylor Kocher, CMA ([facility]) sent at 5/21/2024 12:05 PM EDT -----       ----- Message from Mari Yan to Zahra English APRN - CNP sent at 5/21/2024 11:39 AM -----   Trying to set up a rheumatologist appointment in [place]. Can you send a referral to [facility] Fax [phone]? They need the HIPA signed by Mari stating that they can talk with Silva at [phone].    Dr. Davis stated the referral needs to come from you.    Thanks

## 2024-05-28 ENCOUNTER — CARE COORDINATION (OUTPATIENT)
Dept: CARE COORDINATION | Age: 89
End: 2024-05-28

## 2024-05-28 NOTE — CARE COORDINATION
ACM will discharge at this time due to Mari moving into an Assisting Living in [place], [place].  RPM equipment has been turned in.   none

## 2024-06-10 DIAGNOSIS — M05.10 RHEUMATOID LUNG (HCC): ICD-10-CM

## 2024-06-10 RX ORDER — IPRATROPIUM BROMIDE AND ALBUTEROL SULFATE 2.5; .5 MG/3ML; MG/3ML
SOLUTION RESPIRATORY (INHALATION)
Qty: 120 EACH | Refills: 11 | Status: SHIPPED | OUTPATIENT
Start: 2024-06-10

## 2024-06-10 NOTE — TELEPHONE ENCOUNTER
Patient reports that they need a refill for Ipratropium-albuterol nebulizer solution     Last office appointment 05/06/24    Patient is scheduled for follow-up in office on 05/06/25.    Last seen by  Zechariah Ly     Medication refill routed to established provider Zechariah Ly      Patient requests 30 day supply.     Preferred pharmacy is [facility]

## 2024-06-12 ENCOUNTER — TELEPHONE (OUTPATIENT)
Dept: CARDIOLOGY CLINIC | Age: 89
End: 2024-06-12

## 2024-06-12 DIAGNOSIS — I10 ESSENTIAL HYPERTENSION: ICD-10-CM

## 2024-06-12 DIAGNOSIS — E78.5 HYPERLIPIDEMIA, UNSPECIFIED HYPERLIPIDEMIA TYPE: ICD-10-CM

## 2024-06-12 DIAGNOSIS — I25.10 CORONARY ARTERY DISEASE INVOLVING NATIVE CORONARY ARTERY OF NATIVE HEART WITHOUT ANGINA PECTORIS: Primary | ICD-10-CM

## 2024-08-23 RX ORDER — ATORVASTATIN CALCIUM 40 MG/1
40 TABLET, FILM COATED ORAL EVERY EVENING
Qty: 90 TABLET | Refills: 0 | Status: SHIPPED | OUTPATIENT
Start: 2024-08-23

## 2024-10-23 ENCOUNTER — HOSPITAL ENCOUNTER (OUTPATIENT)
Dept: NURSING | Age: 89
Discharge: HOME | End: 2024-10-23

## 2025-05-19 ENCOUNTER — CARE COORDINATION (OUTPATIENT)
Dept: CARE COORDINATION | Age: 89
End: 2025-05-19

## 2025-05-19 NOTE — CARE COORDINATION
ACM screening for possible enrollment.  Mari currently resides in [place], [place].  Will not enroll at this time.